# Patient Record
Sex: FEMALE | Race: WHITE | Employment: OTHER | URBAN - METROPOLITAN AREA
[De-identification: names, ages, dates, MRNs, and addresses within clinical notes are randomized per-mention and may not be internally consistent; named-entity substitution may affect disease eponyms.]

---

## 2017-01-13 RX ORDER — FENOFIBRATE 160 MG/1
TABLET ORAL
Qty: 90 TABLET | Refills: 0 | Status: SHIPPED | OUTPATIENT
Start: 2017-01-13 | End: 2017-04-13

## 2017-01-23 ENCOUNTER — OFFICE VISIT (OUTPATIENT)
Dept: INTERNAL MEDICINE CLINIC | Facility: CLINIC | Age: 77
End: 2017-01-23

## 2017-01-23 VITALS
WEIGHT: 212 LBS | SYSTOLIC BLOOD PRESSURE: 136 MMHG | DIASTOLIC BLOOD PRESSURE: 78 MMHG | OXYGEN SATURATION: 96 % | BODY MASS INDEX: 37.56 KG/M2 | HEIGHT: 63 IN | RESPIRATION RATE: 14 BRPM | HEART RATE: 74 BPM | TEMPERATURE: 98 F

## 2017-01-23 DIAGNOSIS — Z12.31 ENCOUNTER FOR SCREENING MAMMOGRAM FOR MALIGNANT NEOPLASM OF BREAST: ICD-10-CM

## 2017-01-23 DIAGNOSIS — Z13.89 SCREENING FOR BLOOD OR PROTEIN IN URINE: ICD-10-CM

## 2017-01-23 DIAGNOSIS — I10 ESSENTIAL HYPERTENSION, MALIGNANT: ICD-10-CM

## 2017-01-23 DIAGNOSIS — Z78.0 POSTMENOPAUSE: ICD-10-CM

## 2017-01-23 DIAGNOSIS — Z13.29 SCREENING FOR THYROID DISORDER: ICD-10-CM

## 2017-01-23 DIAGNOSIS — Z13.220 SCREENING CHOLESTEROL LEVEL: ICD-10-CM

## 2017-01-23 DIAGNOSIS — E78.1 HYPERTRIGLYCERIDEMIA: ICD-10-CM

## 2017-01-23 DIAGNOSIS — R73.02 GLUCOSE INTOLERANCE (IMPAIRED GLUCOSE TOLERANCE): Chronic | ICD-10-CM

## 2017-01-23 DIAGNOSIS — Z00.00 ROUTINE GENERAL MEDICAL EXAMINATION AT A HEALTH CARE FACILITY: Primary | ICD-10-CM

## 2017-01-23 PROCEDURE — G0439 PPPS, SUBSEQ VISIT: HCPCS | Performed by: INTERNAL MEDICINE

## 2017-01-23 PROCEDURE — 93000 ELECTROCARDIOGRAM COMPLETE: CPT | Performed by: INTERNAL MEDICINE

## 2017-01-23 RX ORDER — BENAZEPRIL HYDROCHLORIDE AND HYDROCHLOROTHIAZIDE 20; 12.5 MG/1; MG/1
TABLET ORAL
Qty: 90 TABLET | Refills: 0 | Status: SHIPPED | OUTPATIENT
Start: 2017-01-23 | End: 2017-04-13

## 2017-01-23 NOTE — PROGRESS NOTES
Neymar Renner [facility] 3/4/1940 is a 77year old female. Patient presents with:   Well Adult      HPI:   No new complaints    Current Outpatient Prescriptions:  BENAZEPRIL-HYDROCHLOROTHIAZIDE 20-12.5 MG Oral Tab TAKE 1 TABLET BY MOUTH EVERY DAY Disp: 90 tablet general state of health, no fatigue, no fever, no weakness, no weight loss or gain . HEENT/Neck:   Head no headache, no dizziness, no lightheadedness. Eyes Sees eye MD every 3 months- no redness, no drainage.  Ears no earaches, no fullness, normal hearing Patient denies depression, hallucinations, memory loss. Anxiety none. Insomnia none. EXAM:   /78 mmHg  Pulse 74  Temp(Src) 98.3 °F (36.8 °C) (Oral)  Resp 14  Ht 63\"  Wt 212 lb  BMI 37.56 kg/m2  SpO2 96%  Breastfeeding?  No    GENERAL: Supraclavicular: none. DERMATOLOGY:   Rash: no.      Refused PAP             ASSESSMENT AND PLAN:   Andrew Ramos was seen today for well adult.     Diagnoses and all orders for this visit:    Routine general medical examination at a health care facility    Neal

## 2017-01-26 ENCOUNTER — LAB ENCOUNTER (OUTPATIENT)
Dept: LAB | Age: 77
End: 2017-01-26
Attending: INTERNAL MEDICINE
Payer: MEDICARE

## 2017-01-26 DIAGNOSIS — Z13.89 SCREENING FOR BLOOD OR PROTEIN IN URINE: ICD-10-CM

## 2017-01-26 DIAGNOSIS — R73.02 GLUCOSE INTOLERANCE (IMPAIRED GLUCOSE TOLERANCE): Chronic | ICD-10-CM

## 2017-01-26 DIAGNOSIS — R73.09 IMPAIRED GLUCOSE TOLERANCE TEST: Primary | ICD-10-CM

## 2017-01-26 DIAGNOSIS — Z13.29 SCREENING FOR THYROID DISORDER: ICD-10-CM

## 2017-01-26 DIAGNOSIS — E78.1 HYPERTRIGLYCERIDEMIA: ICD-10-CM

## 2017-01-26 LAB
ALBUMIN SERPL-MCNC: 4.1 G/DL (ref 3.5–4.8)
ALP LIVER SERPL-CCNC: 44 U/L (ref 55–142)
ALT SERPL-CCNC: 35 U/L (ref 14–54)
AST SERPL-CCNC: 23 U/L (ref 15–41)
BASOPHILS # BLD AUTO: 0.06 X10(3) UL (ref 0–0.1)
BASOPHILS NFR BLD AUTO: 0.7 %
BILIRUB SERPL-MCNC: 0.4 MG/DL (ref 0.1–2)
BILIRUB UR QL STRIP.AUTO: NEGATIVE
BUN BLD-MCNC: 23 MG/DL (ref 8–20)
CALCIUM BLD-MCNC: 10 MG/DL (ref 8.3–10.3)
CHLORIDE: 103 MMOL/L (ref 101–111)
CHOLEST SMN-MCNC: 162 MG/DL (ref ?–200)
CO2: 30 MMOL/L (ref 22–32)
COLOR UR AUTO: YELLOW
CREAT BLD-MCNC: 0.96 MG/DL (ref 0.55–1.02)
CREAT UR-SCNC: 80.4 MG/DL
EOSINOPHIL # BLD AUTO: 0.39 X10(3) UL (ref 0–0.3)
EOSINOPHIL NFR BLD AUTO: 4.8 %
ERYTHROCYTE [DISTWIDTH] IN BLOOD BY AUTOMATED COUNT: 13.6 % (ref 11.5–16)
EST. AVERAGE GLUCOSE BLD GHB EST-MCNC: 126 MG/DL (ref 68–126)
GLUCOSE BLD-MCNC: 97 MG/DL (ref 70–99)
GLUCOSE UR STRIP.AUTO-MCNC: NEGATIVE MG/DL
HBA1C MFR BLD HPLC: 6 % (ref ?–5.7)
HCT VFR BLD AUTO: 42.3 % (ref 34–50)
HDLC SERPL-MCNC: 43 MG/DL (ref 45–?)
HDLC SERPL: 3.77 {RATIO} (ref ?–4.44)
HGB BLD-MCNC: 13.4 G/DL (ref 12–16)
IMMATURE GRANULOCYTE COUNT: 0.03 X10(3) UL (ref 0–1)
IMMATURE GRANULOCYTE RATIO %: 0.4 %
KETONES UR STRIP.AUTO-MCNC: NEGATIVE MG/DL
LDLC SERPL CALC-MCNC: 81 MG/DL (ref ?–130)
LYMPHOCYTES # BLD AUTO: 3.19 X10(3) UL (ref 0.9–4)
LYMPHOCYTES NFR BLD AUTO: 39.5 %
M PROTEIN MFR SERPL ELPH: 7.9 G/DL (ref 6.1–8.3)
MCH RBC QN AUTO: 28 PG (ref 27–33.2)
MCHC RBC AUTO-ENTMCNC: 31.7 G/DL (ref 31–37)
MCV RBC AUTO: 88.5 FL (ref 81–100)
MICROALBUMIN UR-MCNC: <0.5 MG/DL
MONOCYTES # BLD AUTO: 0.81 X10(3) UL (ref 0.1–0.6)
MONOCYTES NFR BLD AUTO: 10 %
NEUTROPHIL ABS PRELIM: 3.59 X10 (3) UL (ref 1.3–6.7)
NEUTROPHILS # BLD AUTO: 3.59 X10(3) UL (ref 1.3–6.7)
NEUTROPHILS NFR BLD AUTO: 44.6 %
NITRITE UR QL STRIP.AUTO: NEGATIVE
NONHDLC SERPL-MCNC: 119 MG/DL (ref ?–130)
PH UR STRIP.AUTO: 6 [PH] (ref 4.5–8)
PLATELET # BLD AUTO: 238 10(3)UL (ref 150–450)
POTASSIUM SERPL-SCNC: 4.3 MMOL/L (ref 3.6–5.1)
PROT UR STRIP.AUTO-MCNC: NEGATIVE MG/DL
RBC # BLD AUTO: 4.78 X10(6)UL (ref 3.8–5.1)
RBC UR QL AUTO: NEGATIVE
RED CELL DISTRIBUTION WIDTH-SD: 43.8 FL (ref 35.1–46.3)
SODIUM SERPL-SCNC: 142 MMOL/L (ref 136–144)
SP GR UR STRIP.AUTO: 1.02 (ref 1–1.03)
THYROXINE (T4): 10.7 UG/DL (ref 4.5–10.9)
TRIGLYCERIDES: 188 MG/DL (ref ?–150)
TSI SER-ACNC: 1.67 MIU/ML (ref 0.35–5.5)
UROBILINOGEN UR STRIP.AUTO-MCNC: 2 MG/DL
VLDL: 38 MG/DL (ref 5–40)
WBC # BLD AUTO: 8.1 X10(3) UL (ref 4–13)

## 2017-01-26 PROCEDURE — 81001 URINALYSIS AUTO W/SCOPE: CPT | Performed by: INTERNAL MEDICINE

## 2017-01-26 PROCEDURE — 84436 ASSAY OF TOTAL THYROXINE: CPT | Performed by: INTERNAL MEDICINE

## 2017-01-26 PROCEDURE — 80053 COMPREHEN METABOLIC PANEL: CPT | Performed by: INTERNAL MEDICINE

## 2017-01-26 PROCEDURE — 84443 ASSAY THYROID STIM HORMONE: CPT | Performed by: INTERNAL MEDICINE

## 2017-01-26 PROCEDURE — 80061 LIPID PANEL: CPT | Performed by: INTERNAL MEDICINE

## 2017-01-26 PROCEDURE — 82570 ASSAY OF URINE CREATININE: CPT | Performed by: INTERNAL MEDICINE

## 2017-01-26 PROCEDURE — 36415 COLL VENOUS BLD VENIPUNCTURE: CPT | Performed by: INTERNAL MEDICINE

## 2017-01-26 PROCEDURE — 83036 HEMOGLOBIN GLYCOSYLATED A1C: CPT | Performed by: INTERNAL MEDICINE

## 2017-01-26 PROCEDURE — 85025 COMPLETE CBC W/AUTO DIFF WBC: CPT | Performed by: INTERNAL MEDICINE

## 2017-01-26 PROCEDURE — 82043 UR ALBUMIN QUANTITATIVE: CPT | Performed by: INTERNAL MEDICINE

## 2017-02-16 ENCOUNTER — HOSPITAL ENCOUNTER (OUTPATIENT)
Dept: MAMMOGRAPHY | Age: 77
Discharge: HOME | End: 2017-02-16
Attending: INTERNAL MEDICINE
Payer: MEDICARE

## 2017-02-16 ENCOUNTER — OFFICE VISIT (OUTPATIENT)
Dept: INTERNAL MEDICINE CLINIC | Facility: CLINIC | Age: 77
End: 2017-02-16

## 2017-02-16 VITALS
SYSTOLIC BLOOD PRESSURE: 162 MMHG | BODY MASS INDEX: 37.56 KG/M2 | WEIGHT: 212 LBS | DIASTOLIC BLOOD PRESSURE: 82 MMHG | HEART RATE: 71 BPM | RESPIRATION RATE: 16 BRPM | HEIGHT: 63 IN | OXYGEN SATURATION: 97 %

## 2017-02-16 DIAGNOSIS — N76.0 ACUTE VAGINITIS: Primary | ICD-10-CM

## 2017-02-16 DIAGNOSIS — Z12.31 ENCOUNTER FOR SCREENING MAMMOGRAM FOR MALIGNANT NEOPLASM OF BREAST: ICD-10-CM

## 2017-02-16 PROCEDURE — 99213 OFFICE O/P EST LOW 20 MIN: CPT | Performed by: INTERNAL MEDICINE

## 2017-02-16 PROCEDURE — 77067 SCR MAMMO BI INCL CAD: CPT

## 2017-02-16 RX ORDER — FLUCONAZOLE 100 MG/1
100 TABLET ORAL DAILY
Qty: 7 TABLET | Refills: 0 | Status: SHIPPED | OUTPATIENT
Start: 2017-02-16 | End: 2017-02-23

## 2017-02-16 NOTE — PROGRESS NOTES
Author Flow LEE 3/4/1940 is a 77year old female. Patient presents with:  Vaginal Problem: itching       HPI:   GYN:    c/o Vaginal discharge white, associated with itching, treated with over-the-counter agents, without any benefit.    Denies : Abnormal 1/13/2016   • Osteoarthritis of left shoulder, unspecified osteoarthritis type 1/13/2016      Social History:    Smoking Status: Never Smoker                      Smokeless Status: Never Used                        Alcohol Use: Yes           0.0 oz/week

## 2017-02-24 ENCOUNTER — OFFICE VISIT (OUTPATIENT)
Dept: INTERNAL MEDICINE CLINIC | Facility: CLINIC | Age: 77
End: 2017-02-24

## 2017-02-24 VITALS
OXYGEN SATURATION: 98 % | SYSTOLIC BLOOD PRESSURE: 140 MMHG | DIASTOLIC BLOOD PRESSURE: 82 MMHG | HEART RATE: 71 BPM | HEIGHT: 62 IN | BODY MASS INDEX: 39.01 KG/M2 | RESPIRATION RATE: 17 BRPM | WEIGHT: 212 LBS | TEMPERATURE: 98 F

## 2017-02-24 DIAGNOSIS — B37.3 VAGINA, CANDIDIASIS: Primary | ICD-10-CM

## 2017-02-24 PROCEDURE — 99213 OFFICE O/P EST LOW 20 MIN: CPT | Performed by: INTERNAL MEDICINE

## 2017-02-24 NOTE — PROGRESS NOTES
Bruce Alfaro  3/4/1940 is a 77year old female. Patient presents with:   Follow - Up    Better symptoms are pretty much resolved  HPI:         Current Outpatient Prescriptions:  METOPROLOL TARTRATE 25 MG Oral Tab TAKE ONE TABLET BY MOUTH TWICE DAILY D Genitourinary:   Loss of control of urine no. Recurrent Urinary Tract Infection (UTI) no . Blood in urine no. Burning on urination no. Difficulty urinating no. Dysuria none. Flank pain no. Frequent Nighttime Urination none . Pain with urination none.  Ricardo Obrien

## 2017-02-28 RX ORDER — ALENDRONATE SODIUM 70 MG/1
TABLET ORAL
Qty: 12 TABLET | Refills: 0 | Status: SHIPPED | OUTPATIENT
Start: 2017-02-28 | End: 2017-05-17

## 2017-02-28 NOTE — TELEPHONE ENCOUNTER
Pt does not meet refill protocol for alendronate since last DEXA was done 12/2014. Medication pending, ok to refill?

## 2017-03-28 ENCOUNTER — OFFICE VISIT (OUTPATIENT)
Dept: INTERNAL MEDICINE CLINIC | Facility: CLINIC | Age: 77
End: 2017-03-28

## 2017-03-28 VITALS
WEIGHT: 216.5 LBS | OXYGEN SATURATION: 97 % | RESPIRATION RATE: 15 BRPM | TEMPERATURE: 98 F | BODY MASS INDEX: 39.84 KG/M2 | HEART RATE: 60 BPM | HEIGHT: 62 IN | SYSTOLIC BLOOD PRESSURE: 132 MMHG | DIASTOLIC BLOOD PRESSURE: 82 MMHG

## 2017-03-28 DIAGNOSIS — R30.0 DYSURIA: Primary | ICD-10-CM

## 2017-03-28 LAB
APPEARANCE: CLEAR
MULTISTIX LOT#: ABNORMAL NUMERIC
NITRITE, URINE: POSITIVE
PH, URINE: 7 (ref 4.5–8)
PROTEIN (URINE DIPSTICK): 30 MG/DL
SPECIFIC GRAVITY: 1.01 (ref 1–1.03)
URINE-COLOR: YELLOW
UROBILINOGEN,SEMI-QN: 2 MG/DL (ref 0–1.9)

## 2017-03-28 PROCEDURE — 81003 URINALYSIS AUTO W/O SCOPE: CPT | Performed by: INTERNAL MEDICINE

## 2017-03-28 PROCEDURE — 99213 OFFICE O/P EST LOW 20 MIN: CPT | Performed by: INTERNAL MEDICINE

## 2017-03-28 PROCEDURE — 87086 URINE CULTURE/COLONY COUNT: CPT | Performed by: INTERNAL MEDICINE

## 2017-03-28 RX ORDER — PHENAZOPYRIDINE HYDROCHLORIDE 200 MG/1
200 TABLET, FILM COATED ORAL 3 TIMES DAILY PRN
Qty: 10 TABLET | Refills: 0 | Status: SHIPPED | OUTPATIENT
Start: 2017-03-28 | End: 2017-03-31

## 2017-03-28 RX ORDER — CIPROFLOXACIN 500 MG/1
500 TABLET, FILM COATED ORAL 2 TIMES DAILY
Qty: 10 TABLET | Refills: 0 | Status: SHIPPED | OUTPATIENT
Start: 2017-03-28 | End: 2017-04-02

## 2017-03-28 NOTE — PROGRESS NOTES
Honorio Gong [facility] 3/4/1940  is a 69year old female. Patient presents with:  Dysuria  Urinary Frequency      HPI:   Patient presents with symptoms of UTI few days.   Frequency and burning noted    Current Outpatient Prescriptions:  Phenazopyridine HCl 200 Status: Never Smoker                      Smokeless Status: Never Used                        Alcohol Use: Yes           0.0 oz/week       0 Standard drinks or equivalent per week       Comment: RARE-WINE COOLER 1-2X YEARLY        REVIEW OF SYSTEMS:   GENE

## 2017-04-13 RX ORDER — FENOFIBRATE 160 MG/1
TABLET ORAL
Qty: 90 TABLET | Refills: 0 | Status: SHIPPED | OUTPATIENT
Start: 2017-04-13 | End: 2017-07-12

## 2017-04-13 RX ORDER — BENAZEPRIL HYDROCHLORIDE AND HYDROCHLOROTHIAZIDE 20; 12.5 MG/1; MG/1
TABLET ORAL
Qty: 90 TABLET | Refills: 0 | Status: SHIPPED | OUTPATIENT
Start: 2017-04-13 | End: 2017-07-12

## 2017-05-07 ENCOUNTER — HOSPITAL ENCOUNTER (EMERGENCY)
Facility: HOSPITAL | Age: 77
Discharge: HOME | End: 2017-05-07
Attending: EMERGENCY MEDICINE
Payer: MEDICARE

## 2017-05-07 VITALS
SYSTOLIC BLOOD PRESSURE: 153 MMHG | DIASTOLIC BLOOD PRESSURE: 83 MMHG | HEIGHT: 63 IN | TEMPERATURE: 97 F | WEIGHT: 211 LBS | OXYGEN SATURATION: 96 % | RESPIRATION RATE: 16 BRPM | BODY MASS INDEX: 37.39 KG/M2 | HEART RATE: 83 BPM

## 2017-05-07 DIAGNOSIS — L02.91 ABSCESS: Primary | ICD-10-CM

## 2017-05-07 PROCEDURE — 99283 EMERGENCY DEPT VISIT LOW MDM: CPT

## 2017-05-07 RX ORDER — SULFAMETHOXAZOLE AND TRIMETHOPRIM 800; 160 MG/1; MG/1
1 TABLET ORAL 2 TIMES DAILY
Qty: 14 TABLET | Refills: 0 | Status: SHIPPED | OUTPATIENT
Start: 2017-05-07 | End: 2017-05-14

## 2017-05-07 NOTE — ED INITIAL ASSESSMENT (HPI)
While putting down 33 bags of mulch today, patient developed a lump in her groin area that she noticed while showering. She notes that it is nonpainful unless she is sitting.  Denies N/V/D

## 2017-05-07 NOTE — ED PROVIDER NOTES
Patient Seen in: [facility] Emergency Department    History   Patient presents with:  Pain (neurologic)    Stated Complaint: groin pain from lifting mulch    HPI    Tsering Stein is a pleasant 32-year-old female coming with complaints of groin pain.   Patient FUTURE REFILLS)   METOPROLOL TARTRATE 25 MG Oral Tab,  TAKE ONE TABLET BY MOUTH TWICE DAILY   [place] 3 1000 MG Oral Cap,  Take 3 capsules by mouth daily. Meloxicam 7.5 MG Oral Tab,  1 tablet daily.    ASPIRIN 81 MG OR TBEC,  1 TABLET DAILY   Acetaminophen- head present. ED Course   Labs Reviewed - No data to display    MDM     Patient clean draped in a sterile fashion with gentle pressure purulent material was expressed from this abscess cavity.   Patient has the wound dressed appropriately she was given wou

## 2017-05-11 ENCOUNTER — OFFICE VISIT (OUTPATIENT)
Dept: INTERNAL MEDICINE CLINIC | Facility: CLINIC | Age: 77
End: 2017-05-11

## 2017-05-11 VITALS
DIASTOLIC BLOOD PRESSURE: 70 MMHG | SYSTOLIC BLOOD PRESSURE: 150 MMHG | BODY MASS INDEX: 38.71 KG/M2 | WEIGHT: 218.5 LBS | HEART RATE: 63 BPM | OXYGEN SATURATION: 97 % | HEIGHT: 63 IN | TEMPERATURE: 98 F | RESPIRATION RATE: 16 BRPM

## 2017-05-11 DIAGNOSIS — L08.9 SKIN INFECTION: Primary | ICD-10-CM

## 2017-05-11 PROCEDURE — 99213 OFFICE O/P EST LOW 20 MIN: CPT | Performed by: INTERNAL MEDICINE

## 2017-05-11 NOTE — PROGRESS NOTES
Kishan Escamilla  3/4/1940 is a 69year old female. Patient presents with:   Follow - Up      HPI:   Follow-up ER wound much better    Current Outpatient Prescriptions:  Sulfamethoxazole-TMP -160 MG Oral Tab per tablet Take 1 tablet by mouth 2 (two) COOLER 1-2X YEARLY       REVIEW OF SYSTEMS:   na      EXAM:   /70 mmHg  Pulse 63  Temp(Src) 98.3 °F (36.8 °C) (Oral)  Resp 16  Ht 63\"  Wt 218 lb 8 oz  BMI 38.72 kg/m2  SpO2 97%    Healing wound noted of the overhang of the abdomen on the left side w

## 2017-05-18 RX ORDER — ALENDRONATE SODIUM 70 MG/1
TABLET ORAL
Qty: 12 TABLET | Refills: 0 | Status: SHIPPED | OUTPATIENT
Start: 2017-05-18 | End: 2017-08-10

## 2017-05-30 PROBLEM — H35.3221 EXUDATIVE AGE-RELATED MACULAR DEGENERATION OF LEFT EYE WITH ACTIVE CHOROIDAL NEOVASCULARIZATION (HCC): Status: ACTIVE | Noted: 2017-05-30

## 2017-07-12 RX ORDER — BENAZEPRIL HYDROCHLORIDE AND HYDROCHLOROTHIAZIDE 20; 12.5 MG/1; MG/1
TABLET ORAL
Qty: 90 TABLET | Refills: 0 | Status: SHIPPED | OUTPATIENT
Start: 2017-07-12 | End: 2017-10-09

## 2017-07-12 RX ORDER — FENOFIBRATE 160 MG/1
TABLET ORAL
Qty: 90 TABLET | Refills: 0 | Status: SHIPPED | OUTPATIENT
Start: 2017-07-12 | End: 2017-10-05

## 2017-07-14 ENCOUNTER — OFFICE VISIT (OUTPATIENT)
Dept: INTERNAL MEDICINE CLINIC | Facility: CLINIC | Age: 77
End: 2017-07-14

## 2017-07-14 VITALS
DIASTOLIC BLOOD PRESSURE: 82 MMHG | RESPIRATION RATE: 17 BRPM | BODY MASS INDEX: 38.98 KG/M2 | OXYGEN SATURATION: 97 % | WEIGHT: 220 LBS | HEIGHT: 63 IN | TEMPERATURE: 98 F | HEART RATE: 70 BPM | SYSTOLIC BLOOD PRESSURE: 124 MMHG

## 2017-07-14 DIAGNOSIS — M54.50 ACUTE MIDLINE LOW BACK PAIN WITHOUT SCIATICA: Primary | ICD-10-CM

## 2017-07-14 LAB
BILIRUB UR QL STRIP.AUTO: NEGATIVE
CLARITY UR REFRACT.AUTO: CLEAR
COLOR UR AUTO: YELLOW
GLUCOSE UR STRIP.AUTO-MCNC: NEGATIVE MG/DL
KETONES UR STRIP.AUTO-MCNC: NEGATIVE MG/DL
LEUKOCYTE ESTERASE UR QL STRIP.AUTO: NEGATIVE
NITRITE UR QL STRIP.AUTO: NEGATIVE
PH UR STRIP.AUTO: 6 [PH] (ref 4.5–8)
PROT UR STRIP.AUTO-MCNC: NEGATIVE MG/DL
RBC UR QL AUTO: NEGATIVE
SP GR UR STRIP.AUTO: 1.01 (ref 1–1.03)
UROBILINOGEN UR STRIP.AUTO-MCNC: <2 MG/DL

## 2017-07-14 PROCEDURE — 81003 URINALYSIS AUTO W/O SCOPE: CPT | Performed by: INTERNAL MEDICINE

## 2017-07-14 PROCEDURE — 99214 OFFICE O/P EST MOD 30 MIN: CPT | Performed by: INTERNAL MEDICINE

## 2017-07-14 RX ORDER — CYCLOBENZAPRINE HCL 5 MG
5 TABLET ORAL 3 TIMES DAILY PRN
Qty: 30 TABLET | Refills: 0 | Status: SHIPPED | OUTPATIENT
Start: 2017-07-14 | End: 2017-07-24

## 2017-07-14 NOTE — PROGRESS NOTES
Zaynab Steinberg  3/4/1940 is a 69year old female.     Patient presents with:  Low Back Pain      HPI:   C/o of low back pain for few days =-working in the garden picking some feeds    Current Outpatient Prescriptions:  Cyclobenzaprine HCl 5 MG Oral Tab Dakota Bundy Comment: RARE-WINE COOLER 1-2X YEARLY       Lumbar Spine/Lower Back:     Radiation of pain none. Fall none. Direct trauma none. Tingling/numbness none. Previous injury none. Previous surgery none. Previous therapy none.    Bowel and bladder i Rebound tenderness: absent. MUSCULOSKELETAL:   Ankle: unremarkable, FROM. neuro vascular distally normal.   Hip: unremarkable, FROM without pain. Knee unremarkable, FROM.    L-S spines: forward flexion 60--extension 25--lat bending 25  Patient looks v 1. You may need to stay in bed the first few days. But, as soon as possible, begin sitting or walking to avoid problems with prolonged bed rest (muscle weakness, worsening back stiffness and pain, blood clots in the legs).   2. When in bed, try to find a po The patient is asked to return in Return if symptoms worsen or fail to improve.   Charo Estrada MD

## 2017-08-01 ENCOUNTER — OFFICE VISIT (OUTPATIENT)
Dept: INTERNAL MEDICINE CLINIC | Facility: CLINIC | Age: 77
End: 2017-08-01

## 2017-08-01 VITALS
WEIGHT: 220 LBS | OXYGEN SATURATION: 94 % | RESPIRATION RATE: 16 BRPM | HEART RATE: 82 BPM | HEIGHT: 63 IN | DIASTOLIC BLOOD PRESSURE: 80 MMHG | BODY MASS INDEX: 38.98 KG/M2 | TEMPERATURE: 98 F | SYSTOLIC BLOOD PRESSURE: 180 MMHG

## 2017-08-01 DIAGNOSIS — M54.50 ACUTE BILATERAL LOW BACK PAIN WITHOUT SCIATICA: Primary | ICD-10-CM

## 2017-08-01 PROCEDURE — 99213 OFFICE O/P EST LOW 20 MIN: CPT | Performed by: INTERNAL MEDICINE

## 2017-08-01 NOTE — PROGRESS NOTES
Romeo Knox  3/4/1940 is a 69year old female.     Patient presents with:  Pain: Pain in back      HPI:   Having low back pain now shooting down in the in the posterior aspect of her thighs    Current Outpatient Prescriptions:  METOPROLOL TARTRATE 25 MG incontinence none. Pain scale 3/10  Increased by movt    REVIEW OF SYSTEMS:     General/Constitutional:   General no fatigue, no fever, no weakness, no weight loss or gain. Gastrointestinal:   Reflux no. Abdominal pain no. Appetite change no.  Black sto distress        ASSESSMENT AND PLAN:   Vesna Lorenzana was seen today for pain. Diagnoses and all orders for this visit:    Acute bilateral low back pain without sciatica  -     OP REFERRAL TO [facility]  -     MRI SPINE LUMBAR (CPT=72148);  Fu

## 2017-08-10 RX ORDER — ALENDRONATE SODIUM 70 MG/1
70 TABLET ORAL WEEKLY
Qty: 12 TABLET | Refills: 0 | Status: SHIPPED | OUTPATIENT
Start: 2017-08-10 | End: 2017-10-26

## 2017-08-10 NOTE — TELEPHONE ENCOUNTER
Pt does not meet protocol due to    DEXA scan within past 2 years       Medication pending, ok to refill?

## 2017-08-15 ENCOUNTER — OFFICE VISIT (OUTPATIENT)
Dept: INTERNAL MEDICINE CLINIC | Facility: CLINIC | Age: 77
End: 2017-08-15

## 2017-08-15 VITALS
WEIGHT: 220 LBS | OXYGEN SATURATION: 98 % | RESPIRATION RATE: 16 BRPM | TEMPERATURE: 98 F | BODY MASS INDEX: 38.98 KG/M2 | HEIGHT: 63 IN | DIASTOLIC BLOOD PRESSURE: 80 MMHG | SYSTOLIC BLOOD PRESSURE: 142 MMHG | HEART RATE: 78 BPM

## 2017-08-15 DIAGNOSIS — M54.50 MIDLINE LOW BACK PAIN WITHOUT SCIATICA, UNSPECIFIED CHRONICITY: Primary | ICD-10-CM

## 2017-08-15 PROCEDURE — 99213 OFFICE O/P EST LOW 20 MIN: CPT | Performed by: INTERNAL MEDICINE

## 2017-08-15 NOTE — PROGRESS NOTES
Flower HOWARD 3/4/1940 is a 69year old female. Patient presents with: Follow - Up      HPI:   Back pain much better    Current Outpatient Prescriptions:  Alendronate Sodium 70 MG Oral Tab Take 1 tablet (70 mg total) by mouth once a week.  Disp: 12 t /80 (BP Location: Left arm, Patient Position: Sitting, Cuff Size: adult)   Pulse 78   Temp 98.1 °F (36.7 °C) (Oral)   Resp 16   Ht 63\"   Wt 220 lb   SpO2 98%   BMI 38.97 kg/m²   Lumbar Spine/Lower back:   LOWER BACK: normal sacroiliac joint mobili

## 2017-09-21 ENCOUNTER — OFFICE VISIT (OUTPATIENT)
Dept: INTERNAL MEDICINE CLINIC | Facility: CLINIC | Age: 77
End: 2017-09-21

## 2017-09-21 VITALS
DIASTOLIC BLOOD PRESSURE: 80 MMHG | RESPIRATION RATE: 16 BRPM | HEART RATE: 78 BPM | BODY MASS INDEX: 38.98 KG/M2 | HEIGHT: 63 IN | WEIGHT: 220 LBS | OXYGEN SATURATION: 98 % | SYSTOLIC BLOOD PRESSURE: 132 MMHG | TEMPERATURE: 98 F

## 2017-09-21 DIAGNOSIS — M54.50 CHRONIC BILATERAL LOW BACK PAIN WITHOUT SCIATICA: Primary | ICD-10-CM

## 2017-09-21 DIAGNOSIS — G89.29 CHRONIC BILATERAL LOW BACK PAIN WITHOUT SCIATICA: Primary | ICD-10-CM

## 2017-09-21 PROCEDURE — 99213 OFFICE O/P EST LOW 20 MIN: CPT | Performed by: INTERNAL MEDICINE

## 2017-09-21 NOTE — PROGRESS NOTES
Iesha Velez [facility] 3/4/1940 is a 69year old female. Patient presents with: Follow - Up      HPI:   Back pain pretty much resolved    Current Outpatient Prescriptions:  Alendronate Sodium 70 MG Oral Tab Take 1 tablet (70 mg total) by mouth once a week.  D /80   Pulse 78   Temp 98.2 °F (36.8 °C) (Oral)   Resp 16   Ht 63\"   Wt 220 lb   SpO2 98%   BMI 38.97 kg/m²   Lumbar Spine/Lower back:   LOWER BACK: normal sacroiliac joint mobility bilaterally. INSPECTION: normal curvature of spine.    PALPATION:

## 2017-10-05 RX ORDER — FENOFIBRATE 160 MG/1
TABLET ORAL
Qty: 90 TABLET | Refills: 0 | Status: SHIPPED | OUTPATIENT
Start: 2017-10-05 | End: 2018-01-05

## 2017-10-09 RX ORDER — BENAZEPRIL HYDROCHLORIDE AND HYDROCHLOROTHIAZIDE 20; 12.5 MG/1; MG/1
TABLET ORAL
Qty: 90 TABLET | Refills: 0 | Status: SHIPPED | OUTPATIENT
Start: 2017-10-09 | End: 2018-01-05

## 2017-10-27 RX ORDER — ALENDRONATE SODIUM 70 MG/1
TABLET ORAL
Qty: 12 TABLET | Refills: 0 | Status: SHIPPED | OUTPATIENT
Start: 2017-10-27 | End: 2018-01-24

## 2018-01-05 ENCOUNTER — TELEPHONE (OUTPATIENT)
Dept: INTERNAL MEDICINE CLINIC | Facility: CLINIC | Age: 78
End: 2018-01-05

## 2018-01-05 RX ORDER — FENOFIBRATE 160 MG/1
TABLET ORAL
Qty: 90 TABLET | Refills: 0 | Status: SHIPPED | OUTPATIENT
Start: 2018-01-05 | End: 2018-04-04

## 2018-01-08 RX ORDER — BENAZEPRIL HYDROCHLORIDE AND HYDROCHLOROTHIAZIDE 20; 12.5 MG/1; MG/1
TABLET ORAL
Qty: 90 TABLET | Refills: 0 | Status: SHIPPED | OUTPATIENT
Start: 2018-01-08 | End: 2018-04-06

## 2018-01-24 RX ORDER — ALENDRONATE SODIUM 70 MG/1
TABLET ORAL
Qty: 12 TABLET | Refills: 0 | Status: SHIPPED | OUTPATIENT
Start: 2018-01-24 | End: 2018-04-16

## 2018-01-26 ENCOUNTER — OFFICE VISIT (OUTPATIENT)
Dept: INTERNAL MEDICINE CLINIC | Facility: CLINIC | Age: 78
End: 2018-01-26

## 2018-01-26 VITALS
RESPIRATION RATE: 16 BRPM | SYSTOLIC BLOOD PRESSURE: 170 MMHG | BODY MASS INDEX: 38.94 KG/M2 | OXYGEN SATURATION: 95 % | HEART RATE: 80 BPM | DIASTOLIC BLOOD PRESSURE: 100 MMHG | HEIGHT: 63 IN | TEMPERATURE: 98 F | WEIGHT: 219.75 LBS

## 2018-01-26 DIAGNOSIS — R73.02 GLUCOSE INTOLERANCE (IMPAIRED GLUCOSE TOLERANCE): Chronic | ICD-10-CM

## 2018-01-26 DIAGNOSIS — Z00.00 ROUTINE GENERAL MEDICAL EXAMINATION AT A HEALTH CARE FACILITY: Primary | ICD-10-CM

## 2018-01-26 DIAGNOSIS — I10 ESSENTIAL HYPERTENSION, BENIGN: Chronic | ICD-10-CM

## 2018-01-26 DIAGNOSIS — M81.0 SENILE OSTEOPOROSIS: ICD-10-CM

## 2018-01-26 DIAGNOSIS — Z12.39 SCREENING FOR BREAST CANCER: ICD-10-CM

## 2018-01-26 PROBLEM — H35.3221 EXUDATIVE AGE-RELATED MACULAR DEGENERATION OF LEFT EYE WITH ACTIVE CHOROIDAL NEOVASCULARIZATION (HCC): Chronic | Status: ACTIVE | Noted: 2017-05-30

## 2018-01-26 PROCEDURE — G0439 PPPS, SUBSEQ VISIT: HCPCS | Performed by: INTERNAL MEDICINE

## 2018-01-26 PROCEDURE — 93000 ELECTROCARDIOGRAM COMPLETE: CPT | Performed by: INTERNAL MEDICINE

## 2018-01-26 NOTE — PROGRESS NOTES
Marguerite Luo [facility] 3/4/1940 is a 69year old female. No chief complaint on file. HPI:   No new complaints    Current Outpatient Prescriptions:  metoprolol Tartrate 25 MG Oral Tab Take 2 tablets (50 mg total) by mouth 2 (two) times daily.  Disp: 180 ta fullness, normal hearing, no tinnitus. Nose and Sinuses no recurrent colds, no discharge, no itching, no hay fever, no nosebleeds, no sinus trouble. Mouth and Pharynx no sore throats, no hoarseness. Neck no lumps, no goiter, no neck stiffness or pain.    En 38.93 kg/m²     GENERAL:   Build: normal .   General Appearance: alert and oriented, pleasant. HEENT:   Ear canals: normal.   EOM: within normal limit-conjunctiva normal.   Head: normocephalic. Nasal septum: midline. Nose: unremarkable.    Oral cavity health care facility  -     ASSAY, THYROID STIM HORMONE; Future  -     T4(THYROXINE TOTAL); Future  -     LIPID PANEL; Future  -     COMP METABOLIC PANEL (14); Future  -     CBC WITH DIFFERENTIAL WITH PLATELET;  Future  -     URINALYSIS, ROUTINE; Future

## 2018-02-02 ENCOUNTER — LAB ENCOUNTER (OUTPATIENT)
Dept: LAB | Age: 78
End: 2018-02-02
Attending: INTERNAL MEDICINE
Payer: MEDICARE

## 2018-02-02 DIAGNOSIS — Z00.00 ROUTINE GENERAL MEDICAL EXAMINATION AT A HEALTH CARE FACILITY: ICD-10-CM

## 2018-02-02 DIAGNOSIS — R73.02 GLUCOSE INTOLERANCE (IMPAIRED GLUCOSE TOLERANCE): Chronic | ICD-10-CM

## 2018-02-02 LAB
ALBUMIN SERPL-MCNC: 4.3 G/DL (ref 3.5–4.8)
ALP LIVER SERPL-CCNC: 43 U/L (ref 55–142)
ALT SERPL-CCNC: 34 U/L (ref 14–54)
AST SERPL-CCNC: 25 U/L (ref 15–41)
BASOPHILS # BLD AUTO: 0.04 X10(3) UL (ref 0–0.1)
BASOPHILS NFR BLD AUTO: 0.4 %
BILIRUB SERPL-MCNC: 0.6 MG/DL (ref 0.1–2)
BILIRUB UR QL STRIP.AUTO: NEGATIVE
BUN BLD-MCNC: 25 MG/DL (ref 8–20)
CALCIUM BLD-MCNC: 10.1 MG/DL (ref 8.3–10.3)
CHLORIDE: 103 MMOL/L (ref 101–111)
CHOLEST SMN-MCNC: 178 MG/DL (ref ?–200)
CLARITY UR REFRACT.AUTO: CLEAR
CO2: 27 MMOL/L (ref 22–32)
COLOR UR AUTO: YELLOW
CREAT BLD-MCNC: 1.01 MG/DL (ref 0.55–1.02)
EOSINOPHIL # BLD AUTO: 0.29 X10(3) UL (ref 0–0.3)
EOSINOPHIL NFR BLD AUTO: 3.2 %
ERYTHROCYTE [DISTWIDTH] IN BLOOD BY AUTOMATED COUNT: 13.6 % (ref 11.5–16)
EST. AVERAGE GLUCOSE BLD GHB EST-MCNC: 134 MG/DL (ref 68–126)
GLUCOSE BLD-MCNC: 114 MG/DL (ref 70–99)
GLUCOSE UR STRIP.AUTO-MCNC: NEGATIVE MG/DL
HBA1C MFR BLD HPLC: 6.3 % (ref ?–5.7)
HCT VFR BLD AUTO: 41.7 % (ref 34–50)
HDLC SERPL-MCNC: 37 MG/DL (ref 45–?)
HDLC SERPL: 4.81 {RATIO} (ref ?–4.44)
HGB BLD-MCNC: 13.4 G/DL (ref 12–16)
IMMATURE GRANULOCYTE COUNT: 0.05 X10(3) UL (ref 0–1)
IMMATURE GRANULOCYTE RATIO %: 0.6 %
KETONES UR STRIP.AUTO-MCNC: NEGATIVE MG/DL
LDLC SERPL CALC-MCNC: 89 MG/DL (ref ?–130)
LEUKOCYTE ESTERASE UR QL STRIP.AUTO: NEGATIVE
LYMPHOCYTES # BLD AUTO: 3.29 X10(3) UL (ref 0.9–4)
LYMPHOCYTES NFR BLD AUTO: 36.5 %
M PROTEIN MFR SERPL ELPH: 7.9 G/DL (ref 6.1–8.3)
MCH RBC QN AUTO: 27.4 PG (ref 27–33.2)
MCHC RBC AUTO-ENTMCNC: 32.1 G/DL (ref 31–37)
MCV RBC AUTO: 85.3 FL (ref 81–100)
MONOCYTES # BLD AUTO: 0.91 X10(3) UL (ref 0.1–0.6)
MONOCYTES NFR BLD AUTO: 10.1 %
NEUTROPHIL ABS PRELIM: 4.44 X10 (3) UL (ref 1.3–6.7)
NEUTROPHILS # BLD AUTO: 4.44 X10(3) UL (ref 1.3–6.7)
NEUTROPHILS NFR BLD AUTO: 49.2 %
NITRITE UR QL STRIP.AUTO: NEGATIVE
NONHDLC SERPL-MCNC: 141 MG/DL (ref ?–130)
PH UR STRIP.AUTO: 7 [PH] (ref 4.5–8)
PLATELET # BLD AUTO: 293 10(3)UL (ref 150–450)
POTASSIUM SERPL-SCNC: 4 MMOL/L (ref 3.6–5.1)
PROT UR STRIP.AUTO-MCNC: NEGATIVE MG/DL
RBC # BLD AUTO: 4.89 X10(6)UL (ref 3.8–5.1)
RBC UR QL AUTO: NEGATIVE
RED CELL DISTRIBUTION WIDTH-SD: 42.2 FL (ref 35.1–46.3)
SODIUM SERPL-SCNC: 139 MMOL/L (ref 136–144)
SP GR UR STRIP.AUTO: 1.01 (ref 1–1.03)
THYROXINE (T4): 10.6 UG/DL (ref 4.5–10.9)
TRIGL SERPL-MCNC: 259 MG/DL (ref ?–150)
TSI SER-ACNC: 2.24 MIU/ML (ref 0.35–5.5)
UROBILINOGEN UR STRIP.AUTO-MCNC: <2 MG/DL
VLDLC SERPL CALC-MCNC: 52 MG/DL (ref 5–40)
WBC # BLD AUTO: 9 X10(3) UL (ref 4–13)

## 2018-02-02 PROCEDURE — 80053 COMPREHEN METABOLIC PANEL: CPT

## 2018-02-02 PROCEDURE — 84436 ASSAY OF TOTAL THYROXINE: CPT

## 2018-02-02 PROCEDURE — 36415 COLL VENOUS BLD VENIPUNCTURE: CPT

## 2018-02-02 PROCEDURE — 80061 LIPID PANEL: CPT

## 2018-02-02 PROCEDURE — 84443 ASSAY THYROID STIM HORMONE: CPT

## 2018-02-02 PROCEDURE — 81003 URINALYSIS AUTO W/O SCOPE: CPT

## 2018-02-02 PROCEDURE — 83036 HEMOGLOBIN GLYCOSYLATED A1C: CPT

## 2018-02-02 PROCEDURE — 85025 COMPLETE CBC W/AUTO DIFF WBC: CPT

## 2018-02-06 ENCOUNTER — TELEPHONE (OUTPATIENT)
Dept: INTERNAL MEDICINE CLINIC | Facility: CLINIC | Age: 78
End: 2018-02-06

## 2018-02-06 DIAGNOSIS — E78.1 HYPERTRIGLYCERIDEMIA: ICD-10-CM

## 2018-02-06 DIAGNOSIS — R73.02 GLUCOSE INTOLERANCE (IMPAIRED GLUCOSE TOLERANCE): Chronic | ICD-10-CM

## 2018-02-06 NOTE — TELEPHONE ENCOUNTER
Please review for correct instructions. Should pt be taking once daily or BID? If BID new script should be sent - the request from pharmacy is for daily dosing.        Requesting Metorprolol Tartrate 25 mg tabs  LOV: 1/26/18  RTC: 1 week   Last Releva

## 2018-02-06 NOTE — TELEPHONE ENCOUNTER
----- Message from José Oviedo MD sent at 2/3/2018  8:25 AM CST -----  Reviewed results   Triglycerides marginally elevated sio is the hemoglobin A1c  Reinforce diet and exercise and recheck in 3 months prior to any med adjustments

## 2018-02-16 ENCOUNTER — TELEPHONE (OUTPATIENT)
Dept: INTERNAL MEDICINE CLINIC | Facility: CLINIC | Age: 78
End: 2018-02-16

## 2018-02-20 ENCOUNTER — HOSPITAL ENCOUNTER (OUTPATIENT)
Dept: MAMMOGRAPHY | Age: 78
Discharge: HOME | End: 2018-02-20
Attending: INTERNAL MEDICINE
Payer: MEDICARE

## 2018-02-20 DIAGNOSIS — Z12.39 SCREENING FOR BREAST CANCER: ICD-10-CM

## 2018-02-20 PROCEDURE — 77067 SCR MAMMO BI INCL CAD: CPT | Performed by: INTERNAL MEDICINE

## 2018-02-21 ENCOUNTER — TELEPHONE (OUTPATIENT)
Dept: INTERNAL MEDICINE CLINIC | Facility: CLINIC | Age: 78
End: 2018-02-21

## 2018-02-21 NOTE — TELEPHONE ENCOUNTER
Patient was called and notified that her forms are complete and will be mailed to [address]

## 2018-04-04 RX ORDER — FENOFIBRATE 160 MG/1
TABLET ORAL
Qty: 90 TABLET | Refills: 0 | Status: SHIPPED | OUTPATIENT
Start: 2018-04-04 | End: 2018-07-08

## 2018-04-04 NOTE — TELEPHONE ENCOUNTER
Refill requested: Fenofribrate 160 mg      Passed protocol      Last refill: 1/5/18 #90 NR  Relevant Labs: 2/2/18 -Recheck in May   Last OV / RTC advised: 1/26/18  Appt Scheduled: No  Your appointments     Date & Time Appointment Department [facility])    José Manuel

## 2018-04-09 RX ORDER — BENAZEPRIL HYDROCHLORIDE AND HYDROCHLOROTHIAZIDE 20; 12.5 MG/1; MG/1
TABLET ORAL
Qty: 90 TABLET | Refills: 0 | Status: SHIPPED | OUTPATIENT
Start: 2018-04-09 | End: 2018-07-11

## 2018-04-09 NOTE — TELEPHONE ENCOUNTER
Medication(s) to Refill:   Pending Prescriptions Disp Refills    BENAZEPRIL-HYDROCHLOROTHIAZIDE 20-12.5 MG Oral Tab [Pharmacy Med Name: BENAZEPRIL/HCTZ 20/12.5MG TABLETS] 90 tablet 0     Sig: TAKE 1 TABLET BY MOUTH EVERY DAY             Reason for [address]

## 2018-04-17 RX ORDER — ALENDRONATE SODIUM 70 MG/1
TABLET ORAL
Qty: 12 TABLET | Refills: 0 | Status: SHIPPED | OUTPATIENT
Start: 2018-04-17 | End: 2018-07-08

## 2018-04-18 PROBLEM — R29.898 WEAKNESS OF RIGHT HAND: Status: ACTIVE | Noted: 2018-04-18

## 2018-04-18 PROBLEM — M19.049 ARTHRITIS OF HAND: Status: ACTIVE | Noted: 2018-04-18

## 2018-05-30 PROBLEM — R20.2 RIGHT HAND PARESTHESIA: Status: ACTIVE | Noted: 2018-05-30

## 2018-07-09 RX ORDER — ALENDRONATE SODIUM 70 MG/1
70 TABLET ORAL WEEKLY
Qty: 12 TABLET | Refills: 1 | Status: SHIPPED | OUTPATIENT
Start: 2018-07-09 | End: 2018-12-26

## 2018-07-09 RX ORDER — FENOFIBRATE 160 MG/1
160 TABLET ORAL DAILY
Qty: 90 TABLET | Refills: 1 | Status: SHIPPED | OUTPATIENT
Start: 2018-07-09 | End: 2019-01-05

## 2018-07-09 NOTE — TELEPHONE ENCOUNTER
Refill requested: Fenofibrate and Alendronate     Failed protocol - Alendronate   Passed protocol - Fenofibrate       Last refill: 4/17/2018 Alendronate 70 mg #12 NR        4/4/2018 Fenofibrate 160 mg #90 NR    Relevant Labs: 2/2/2018 Lipid Panel     12/24

## 2018-07-12 RX ORDER — BENAZEPRIL HYDROCHLORIDE AND HYDROCHLOROTHIAZIDE 20; 12.5 MG/1; MG/1
1 TABLET ORAL DAILY
Qty: 90 TABLET | Refills: 0 | Status: SHIPPED | OUTPATIENT
Start: 2018-07-12 | End: 2018-09-11

## 2018-07-12 NOTE — TELEPHONE ENCOUNTER
Refill requested: Benazepril / Hydrochlorothiazide 20-12.5 mg     Passed protocol      Last refill: 4/9/18 #90 NR    Relevant Labs: CMP 2/2/18   BP Readings from Last 3 Encounters:  01/26/18 : (!) 170/100  09/21/17 : 132/80  08/15/17 : 142/80      Last OV

## 2018-07-31 ENCOUNTER — OFFICE VISIT (OUTPATIENT)
Dept: INTERNAL MEDICINE CLINIC | Facility: CLINIC | Age: 78
End: 2018-07-31
Payer: MEDICARE

## 2018-07-31 VITALS
TEMPERATURE: 98 F | HEART RATE: 58 BPM | RESPIRATION RATE: 18 BRPM | WEIGHT: 230 LBS | SYSTOLIC BLOOD PRESSURE: 160 MMHG | OXYGEN SATURATION: 97 % | HEIGHT: 63 IN | DIASTOLIC BLOOD PRESSURE: 86 MMHG | BODY MASS INDEX: 40.75 KG/M2

## 2018-07-31 DIAGNOSIS — E78.1 HYPERTRIGLYCERIDEMIA: ICD-10-CM

## 2018-07-31 DIAGNOSIS — R73.02 GLUCOSE INTOLERANCE (IMPAIRED GLUCOSE TOLERANCE): Chronic | ICD-10-CM

## 2018-07-31 DIAGNOSIS — I10 ESSENTIAL HYPERTENSION, BENIGN: Primary | Chronic | ICD-10-CM

## 2018-07-31 PROCEDURE — 99213 OFFICE O/P EST LOW 20 MIN: CPT | Performed by: INTERNAL MEDICINE

## 2018-07-31 RX ORDER — AMLODIPINE BESYLATE 5 MG/1
5 TABLET ORAL DAILY
Qty: 30 TABLET | Refills: 11 | Status: SHIPPED | OUTPATIENT
Start: 2018-07-31 | End: 2018-08-30

## 2018-07-31 NOTE — PROGRESS NOTES
Ross Schneider [facility] 3/4/1940 is a 69year old female. Patient presents with: Follow - Up       HPI:   Follow-up 6 months -no complaints. Current Outpatient Prescriptions: AmLODIPine Besylate 5 MG Oral Tab Take 1 tablet (5 mg total) by mouth daily.  D Dizziness no. Dyspnea on exertion none. Fainting none. Fatigue no. High blood pressure on medication(s). Irregular heart beat no. Leg edema no. Murmurs no. Orthopnea no.       EXAM:   /80 (BP Location: Left arm, Patient Position: Sitting, Cuff Size: a

## 2018-08-21 ENCOUNTER — APPOINTMENT (OUTPATIENT)
Dept: LAB | Age: 78
End: 2018-08-21
Attending: INTERNAL MEDICINE
Payer: MEDICARE

## 2018-08-21 DIAGNOSIS — E78.1 HYPERTRIGLYCERIDEMIA: ICD-10-CM

## 2018-08-21 DIAGNOSIS — I10 ESSENTIAL HYPERTENSION, BENIGN: Chronic | ICD-10-CM

## 2018-08-21 DIAGNOSIS — R73.02 GLUCOSE INTOLERANCE (IMPAIRED GLUCOSE TOLERANCE): Chronic | ICD-10-CM

## 2018-08-21 LAB
ALBUMIN SERPL-MCNC: 4.2 G/DL (ref 3.5–4.8)
ALBUMIN/GLOB SERPL: 1.2 {RATIO} (ref 1–2)
ALP LIVER SERPL-CCNC: 44 U/L (ref 55–142)
ALT SERPL-CCNC: 28 U/L (ref 14–54)
ANION GAP SERPL CALC-SCNC: 8 MMOL/L (ref 0–18)
AST SERPL-CCNC: 24 U/L (ref 15–41)
BILIRUB SERPL-MCNC: 0.4 MG/DL (ref 0.1–2)
BUN BLD-MCNC: 24 MG/DL (ref 8–20)
BUN/CREAT SERPL: 24 (ref 10–20)
CALCIUM BLD-MCNC: 10.3 MG/DL (ref 8.3–10.3)
CHLORIDE SERPL-SCNC: 105 MMOL/L (ref 101–111)
CHOLEST SMN-MCNC: 197 MG/DL (ref ?–200)
CO2 SERPL-SCNC: 29 MMOL/L (ref 22–32)
CREAT BLD-MCNC: 1 MG/DL (ref 0.55–1.02)
EST. AVERAGE GLUCOSE BLD GHB EST-MCNC: 140 MG/DL (ref 68–126)
GLOBULIN PLAS-MCNC: 3.4 G/DL (ref 2.5–4)
GLUCOSE BLD-MCNC: 124 MG/DL (ref 70–99)
HBA1C MFR BLD HPLC: 6.5 % (ref ?–5.7)
HDLC SERPL-MCNC: 34 MG/DL (ref 40–59)
LDLC SERPL CALC-MCNC: 102 MG/DL (ref ?–100)
M PROTEIN MFR SERPL ELPH: 7.6 G/DL (ref 6.1–8.3)
NONHDLC SERPL-MCNC: 163 MG/DL (ref ?–130)
OSMOLALITY SERPL CALC.SUM OF ELEC: 299 MOSM/KG (ref 275–295)
POTASSIUM SERPL-SCNC: 4.7 MMOL/L (ref 3.6–5.1)
SODIUM SERPL-SCNC: 142 MMOL/L (ref 136–144)
TRIGL SERPL-MCNC: 303 MG/DL (ref 30–149)
VLDLC SERPL CALC-MCNC: 61 MG/DL (ref 0–30)

## 2018-08-21 PROCEDURE — 36415 COLL VENOUS BLD VENIPUNCTURE: CPT

## 2018-08-21 PROCEDURE — 80061 LIPID PANEL: CPT

## 2018-08-21 PROCEDURE — 83036 HEMOGLOBIN GLYCOSYLATED A1C: CPT

## 2018-08-21 PROCEDURE — 80053 COMPREHEN METABOLIC PANEL: CPT

## 2018-08-28 ENCOUNTER — HOSPITAL ENCOUNTER (OUTPATIENT)
Dept: MRI IMAGING | Age: 78
Discharge: HOME | End: 2018-08-28
Attending: ORTHOPAEDIC SURGERY
Payer: MEDICARE

## 2018-08-28 DIAGNOSIS — G56.21 LESION OF RIGHT ULNAR NERVE: ICD-10-CM

## 2018-08-28 DIAGNOSIS — M19.031 ARTHRITIS OF RIGHT WRIST: ICD-10-CM

## 2018-08-28 PROCEDURE — 73221 MRI JOINT UPR EXTREM W/O DYE: CPT | Performed by: ORTHOPAEDIC SURGERY

## 2018-08-30 ENCOUNTER — OFFICE VISIT (OUTPATIENT)
Dept: INTERNAL MEDICINE CLINIC | Facility: CLINIC | Age: 78
End: 2018-08-30
Payer: MEDICARE

## 2018-08-30 VITALS
DIASTOLIC BLOOD PRESSURE: 70 MMHG | OXYGEN SATURATION: 97 % | WEIGHT: 230 LBS | BODY MASS INDEX: 41 KG/M2 | TEMPERATURE: 98 F | SYSTOLIC BLOOD PRESSURE: 130 MMHG | RESPIRATION RATE: 12 BRPM | HEART RATE: 60 BPM

## 2018-08-30 DIAGNOSIS — E78.1 HYPERTRIGLYCERIDEMIA: ICD-10-CM

## 2018-08-30 DIAGNOSIS — I10 ESSENTIAL HYPERTENSION, BENIGN: Primary | Chronic | ICD-10-CM

## 2018-08-30 DIAGNOSIS — R73.02 GLUCOSE INTOLERANCE (IMPAIRED GLUCOSE TOLERANCE): Chronic | ICD-10-CM

## 2018-08-30 PROCEDURE — 99213 OFFICE O/P EST LOW 20 MIN: CPT | Performed by: INTERNAL MEDICINE

## 2018-08-30 RX ORDER — METFORMIN HYDROCHLORIDE 750 MG/1
750 TABLET, EXTENDED RELEASE ORAL DAILY
Qty: 30 TABLET | Refills: 3 | Status: SHIPPED | OUTPATIENT
Start: 2018-08-30 | End: 2018-11-30

## 2018-08-30 RX ORDER — AMLODIPINE BESYLATE 5 MG/1
5 TABLET ORAL DAILY
Qty: 30 TABLET | Refills: 11 | Status: SHIPPED | OUTPATIENT
Start: 2018-08-30 | End: 2019-03-28

## 2018-08-30 RX ORDER — BIOTIN 1 MG
1 TABLET ORAL DAILY
COMMUNITY

## 2018-08-30 RX ORDER — VIT A/VIT C/VIT E/ZINC/COPPER 7160-113
2 TABLET, DELAYED RELEASE (ENTERIC COATED) ORAL 2 TIMES DAILY
COMMUNITY

## 2018-08-30 NOTE — PATIENT INSTRUCTIONS
Spacing of meals -varying exercises discussed with patient   Reasoning of checking sugars pre and 2 hours  PP discussed with pt     Diabetic foot care information and brochure given to the patient

## 2018-08-30 NOTE — PROGRESS NOTES
Andra Shah [facility] 3/4/1940 is a 69year old female. 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  -     HEMOGLOBIN A1C; Future    Hypertriglyceridemia  -     LIPID PANEL;  Future       Patient was advised to see the nutritionist she refused

## 2018-09-10 ENCOUNTER — TELEPHONE (OUTPATIENT)
Dept: INTERNAL MEDICINE CLINIC | Facility: CLINIC | Age: 78
End: 2018-09-10

## 2018-09-10 NOTE — TELEPHONE ENCOUNTER
Spoke with pharmacy. They stated that pt informed them she was to start taking Benazepril-Hydrochlorothiazide 20-12.5 MG Oral Tab BID instead of daily. Per OV notes and medication list I do not see where pt was instructed to take 2 tablets.      Please advi

## 2018-09-10 NOTE — TELEPHONE ENCOUNTER
[facility] pharmacy is calling to have the dose changed for the prescription for Benazepril-Hydrochlorothiazide 20-12.5 MG Oral Tab. Needs to be changed to 2 tablets instead of 1. Please advise.

## 2018-09-10 NOTE — TELEPHONE ENCOUNTER
Please  verify with patient all the medicine she is taking.   Please verify were these the medicines she was taking during her last office visit

## 2018-09-12 PROBLEM — G56.21 LESION OF RIGHT ULNAR NERVE: Status: ACTIVE | Noted: 2018-09-12

## 2018-09-12 PROBLEM — M67.431 GANGLION OF RIGHT WRIST: Status: ACTIVE | Noted: 2018-09-12

## 2018-09-13 RX ORDER — BENAZEPRIL HYDROCHLORIDE AND HYDROCHLOROTHIAZIDE 20; 12.5 MG/1; MG/1
TABLET ORAL
Qty: 90 TABLET | Refills: 0 | Status: SHIPPED | OUTPATIENT
Start: 2018-09-13 | End: 2018-12-09

## 2018-09-13 NOTE — TELEPHONE ENCOUNTER
Protocol passed    Medication(s) to Refill:   Requested Prescriptions     Pending Prescriptions Disp Refills   • BENAZEPRIL-HYDROCHLOROTHIAZIDE 20-12.5 MG Oral Tab [Pharmacy Med Name: BENAZEPRIL/HCTZ 20/12.5MG TABLETS] 90 tablet 0     Sig: TAKE 1 TABLET BY Resulting Agency  [facility]         Specimen Collected: 08/21/18  7:58 AM   Last Resulted: 08/21/18  1:52 PM

## 2018-09-14 ENCOUNTER — TELEPHONE (OUTPATIENT)
Dept: INTERNAL MEDICINE CLINIC | Facility: CLINIC | Age: 78
End: 2018-09-14

## 2018-09-14 NOTE — TELEPHONE ENCOUNTER
Verified medications with pt. Pt was taking 1 tablet of benazepril-hctz daily. Pt states she was instructed at [facility] on 8/30/18 to start taking 2 tablets. Please advise.

## 2018-09-15 NOTE — TELEPHONE ENCOUNTER
[facility] patient's voice message at home to continue current medicines as listed on the med sheet. To see me on Monday for verification of meds.   Unable to leave message on cell phone

## 2018-09-15 NOTE — TELEPHONE ENCOUNTER
Patient scheduled OV for this Monday    Future Appointments   Date Time Provider Isiah Rodríguez   9/17/2018 10:00 AM Maggy Alves MD [facility] [place]

## 2018-09-17 ENCOUNTER — OFFICE VISIT (OUTPATIENT)
Dept: INTERNAL MEDICINE CLINIC | Facility: CLINIC | Age: 78
End: 2018-09-17
Payer: MEDICARE

## 2018-09-17 VITALS
HEART RATE: 67 BPM | TEMPERATURE: 98 F | DIASTOLIC BLOOD PRESSURE: 68 MMHG | WEIGHT: 222.25 LBS | BODY MASS INDEX: 39 KG/M2 | OXYGEN SATURATION: 97 % | SYSTOLIC BLOOD PRESSURE: 130 MMHG | RESPIRATION RATE: 12 BRPM

## 2018-09-17 DIAGNOSIS — I10 ESSENTIAL HYPERTENSION, BENIGN: Primary | Chronic | ICD-10-CM

## 2018-09-17 PROCEDURE — 99213 OFFICE O/P EST LOW 20 MIN: CPT | Performed by: INTERNAL MEDICINE

## 2018-09-17 NOTE — PROGRESS NOTES
Brittany Ledesma [facility] 3/4/1940 is a 69year old female.     Patient presents with:  Medication Follow-Up    bp check med verification    Current Outpatient Medications:  BENAZEPRIL-HYDROCHLOROTHIAZIDE 20-12.5 MG Oral Tab TAKE 1 TABLET BY MOUTH DAILY Disp: 90 t COOLER 1-2 X YEARLY    Drug use: No       REVIEW OF SYSTEMS:   Cardiovascular:   Syncope none. Rapid heart beat at rest no. Change in exercise tolerance no. Chest pain no. Chest pain while awake none. Cold extremities no. Dizziness no.  Dyspnea on exertion

## 2018-10-22 NOTE — TELEPHONE ENCOUNTER
Medication(s) to Refill:   Requested Prescriptions     Pending Prescriptions Disp Refills   • METOPROLOL TARTRATE 25 MG Oral Tab [Pharmacy Med Name: METOPROL TAR 25MG   TAB] 180 tablet 0     Sig: TAKE ONE TABLET BY MOUTH TWICE DAILY       Last Time Angelica Liu

## 2018-11-15 ENCOUNTER — APPOINTMENT (OUTPATIENT)
Dept: LAB | Age: 78
End: 2018-11-15
Attending: INTERNAL MEDICINE
Payer: MEDICARE

## 2018-11-15 DIAGNOSIS — R73.02 GLUCOSE INTOLERANCE (IMPAIRED GLUCOSE TOLERANCE): Chronic | ICD-10-CM

## 2018-11-15 DIAGNOSIS — I10 ESSENTIAL HYPERTENSION, BENIGN: Chronic | ICD-10-CM

## 2018-11-15 DIAGNOSIS — E78.1 HYPERTRIGLYCERIDEMIA: ICD-10-CM

## 2018-11-15 PROCEDURE — 80061 LIPID PANEL: CPT

## 2018-11-15 PROCEDURE — 80053 COMPREHEN METABOLIC PANEL: CPT

## 2018-11-15 PROCEDURE — 36415 COLL VENOUS BLD VENIPUNCTURE: CPT

## 2018-11-15 PROCEDURE — 83036 HEMOGLOBIN GLYCOSYLATED A1C: CPT

## 2018-11-20 ENCOUNTER — TELEPHONE (OUTPATIENT)
Dept: INTERNAL MEDICINE CLINIC | Facility: CLINIC | Age: 78
End: 2018-11-20

## 2018-11-20 DIAGNOSIS — R73.02 GLUCOSE INTOLERANCE (IMPAIRED GLUCOSE TOLERANCE): Chronic | ICD-10-CM

## 2018-11-20 DIAGNOSIS — E78.1 HYPERTRIGLYCERIDEMIA: ICD-10-CM

## 2018-11-20 DIAGNOSIS — I10 ESSENTIAL HYPERTENSION, BENIGN: Primary | Chronic | ICD-10-CM

## 2018-11-21 NOTE — TELEPHONE ENCOUNTER
Notes recorded by Hang Triana MD on 11/17/2018 at 11:12 AM CST  Reviewed results   Hemoglobin A1c stable at 6.4 triglycerides are marginally elevated.  Could do better.  No increase in medicines needed at this moment.  Reinforce diet and weight loss re

## 2018-11-30 ENCOUNTER — OFFICE VISIT (OUTPATIENT)
Dept: INTERNAL MEDICINE CLINIC | Facility: CLINIC | Age: 78
End: 2018-11-30
Payer: MEDICARE

## 2018-11-30 VITALS
BODY MASS INDEX: 40 KG/M2 | TEMPERATURE: 98 F | RESPIRATION RATE: 20 BRPM | HEART RATE: 64 BPM | DIASTOLIC BLOOD PRESSURE: 80 MMHG | OXYGEN SATURATION: 96 % | SYSTOLIC BLOOD PRESSURE: 138 MMHG | WEIGHT: 227.5 LBS

## 2018-11-30 DIAGNOSIS — E78.1 HYPERTRIGLYCERIDEMIA: ICD-10-CM

## 2018-11-30 DIAGNOSIS — Z00.00 ROUTINE GENERAL MEDICAL EXAMINATION AT A HEALTH CARE FACILITY: ICD-10-CM

## 2018-11-30 DIAGNOSIS — R73.02 GLUCOSE INTOLERANCE (IMPAIRED GLUCOSE TOLERANCE): Primary | ICD-10-CM

## 2018-11-30 PROCEDURE — 99213 OFFICE O/P EST LOW 20 MIN: CPT | Performed by: INTERNAL MEDICINE

## 2018-11-30 RX ORDER — METFORMIN HYDROCHLORIDE 750 MG/1
750 TABLET, EXTENDED RELEASE ORAL 2 TIMES DAILY WITH MEALS
Qty: 180 TABLET | Refills: 3 | COMMUNITY
Start: 2018-11-30 | End: 2019-02-14

## 2018-11-30 NOTE — PROGRESS NOTES
Lidia Gary  3/4/1940 is a 69year old female. Patient presents with: Follow - Up      HPI:   DIABETES MELLITUS:   The diet that the patient has been following is the American Diabetes Association diet.    The frequency of the monitoring schedule is • Osteoarthrosis, unspecified whether generalized or localized, unspecified site    • Other and unspecified hyperlipidemia    • PONV (postoperative nausea and vomiting)     slow to awaken   • S/P total knee replacement using cement, left 1/13/2016   • Se facility  -     CBC WITH DIFFERENTIAL WITH PLATELET; Future  -     COMP METABOLIC PANEL (14); Future  -     HEMOGLOBIN A1C; Future  -     LIPID PANEL; Future  -     T4(THYROXINE TOTAL);  Future  -     ASSAY, THYROID STIM HORMONE; Future  -     URINALYSIS, R

## 2018-12-10 RX ORDER — BENAZEPRIL HYDROCHLORIDE AND HYDROCHLOROTHIAZIDE 20; 12.5 MG/1; MG/1
TABLET ORAL
Qty: 90 TABLET | Refills: 0 | Status: SHIPPED | OUTPATIENT
Start: 2018-12-10 | End: 2019-03-14

## 2018-12-10 NOTE — TELEPHONE ENCOUNTER
Refill requested:   Requested Prescriptions     Pending Prescriptions Disp Refills   • BENAZEPRIL-HYDROCHLOROTHIAZIDE 20-12.5 MG Oral Tab [Pharmacy Med Name: BENAZEPRIL/HCTZ 20/12.5MG TABLETS] 90 tablet 0     Sig: TAKE 1 TABLET BY MOUTH DAILY         Passe

## 2018-12-17 DIAGNOSIS — R73.02 GLUCOSE INTOLERANCE (IMPAIRED GLUCOSE TOLERANCE): Chronic | ICD-10-CM

## 2018-12-17 RX ORDER — METFORMIN HYDROCHLORIDE 750 MG/1
TABLET, EXTENDED RELEASE ORAL
Qty: 90 TABLET | Refills: 1 | Status: SHIPPED | OUTPATIENT
Start: 2018-12-17 | End: 2019-02-14

## 2018-12-17 NOTE — TELEPHONE ENCOUNTER
Refill requested:   Requested Prescriptions     Pending Prescriptions Disp Refills   • METFORMIN HCL  MG Oral Tablet 24 Hr [Pharmacy Med Name: METFORMIN ER 750MG  TAB] 90 tablet 1     Sig: TAKE 1 TABLET BY MOUTH ONCE DAILY       Failed protocol    Stephen Osei

## 2018-12-27 RX ORDER — ALENDRONATE SODIUM 70 MG/1
TABLET ORAL
Qty: 12 TABLET | Refills: 1 | Status: SHIPPED | OUTPATIENT
Start: 2018-12-27 | End: 2019-06-26

## 2018-12-27 NOTE — TELEPHONE ENCOUNTER
Protocol failed - DEXA scan within past 2 years    Medication(s) to Refill:   Requested Prescriptions     Pending Prescriptions Disp Refills   • ALENDRONATE 70 MG Oral Tab [Pharmacy Med Name: ALENDRONATE 70MG    TAB] 12 tablet 1     Sig: TAKE 1 TABLET BY M

## 2019-01-07 RX ORDER — FENOFIBRATE 160 MG/1
TABLET ORAL
Qty: 90 TABLET | Refills: 1 | Status: SHIPPED | OUTPATIENT
Start: 2019-01-07 | End: 2019-06-26

## 2019-01-07 NOTE — TELEPHONE ENCOUNTER
Passed Protocol    Medication(s) to Refill:   Requested Prescriptions     Pending Prescriptions Disp Refills   • FENOFIBRATE 160 MG Oral Tab [Pharmacy Med Name: FENOFIBRATE 160MG   TAB] 90 tablet 1     Sig: TAKE 1 TABLET BY MOUTH ONCE DAILY       Last Time

## 2019-01-21 NOTE — TELEPHONE ENCOUNTER
Refill requested:   Requested Prescriptions     Pending Prescriptions Disp Refills   • METOPROLOL TARTRATE 25 MG Oral Tab [Pharmacy Med Name: Adarsh Eaton TAR 25MG   TAB] 180 tablet 0     Sig: TAKE 1 TABLET BY MOUTH TWICE DAILY         Passed protocol    Last

## 2019-02-14 ENCOUNTER — OFFICE VISIT (OUTPATIENT)
Dept: INTERNAL MEDICINE CLINIC | Facility: CLINIC | Age: 79
End: 2019-02-14
Payer: MEDICARE

## 2019-02-14 VITALS
TEMPERATURE: 98 F | WEIGHT: 222.5 LBS | RESPIRATION RATE: 16 BRPM | BODY MASS INDEX: 39 KG/M2 | DIASTOLIC BLOOD PRESSURE: 78 MMHG | SYSTOLIC BLOOD PRESSURE: 146 MMHG | HEART RATE: 60 BPM | OXYGEN SATURATION: 97 %

## 2019-02-14 DIAGNOSIS — Z00.00 LABORATORY EXAMINATION ORDERED AS PART OF A ROUTINE GENERAL MEDICAL EXAMINATION: ICD-10-CM

## 2019-02-14 DIAGNOSIS — R73.02 GLUCOSE INTOLERANCE (IMPAIRED GLUCOSE TOLERANCE): Chronic | ICD-10-CM

## 2019-02-14 DIAGNOSIS — N30.01 ACUTE CYSTITIS WITH HEMATURIA: Primary | ICD-10-CM

## 2019-02-14 PROCEDURE — 99213 OFFICE O/P EST LOW 20 MIN: CPT | Performed by: INTERNAL MEDICINE

## 2019-02-14 RX ORDER — METFORMIN HYDROCHLORIDE 750 MG/1
750 TABLET, EXTENDED RELEASE ORAL 2 TIMES DAILY WITH MEALS
Qty: 180 TABLET | Refills: 3 | Status: SHIPPED | OUTPATIENT
Start: 2019-02-14 | End: 2019-08-27

## 2019-02-14 RX ORDER — CIPROFLOXACIN 500 MG/1
500 TABLET, FILM COATED ORAL 2 TIMES DAILY
Qty: 20 TABLET | Refills: 0 | Status: SHIPPED | OUTPATIENT
Start: 2019-02-14 | End: 2019-02-24

## 2019-02-14 NOTE — PROGRESS NOTES
Iesha Velez  3/4/1940  is a 69year old female. Patient presents with:  Kidney Problem      HPI:   Patient presents with symptoms of UTI 2 days some burning took some over-the-counter Pyridium.   Currently feels a lot better    Current Outpatient Med stricture due to infection    • Urinary incontinence       Social History:  Social History    Tobacco Use      Smoking status: Never Smoker      Smokeless tobacco: Never Used    Alcohol use:  Yes      Alcohol/week: 0.0 oz      Comment: RARE-WINE COOLER 1-2 intake  The patient indicates understanding of these issues and agrees to the plan. The patient is asked to return in Return in about 4 weeks (around 3/14/2019), or cpx .      Kiara Horton MD

## 2019-03-12 ENCOUNTER — LAB ENCOUNTER (OUTPATIENT)
Dept: LAB | Age: 79
End: 2019-03-12
Attending: INTERNAL MEDICINE
Payer: MEDICARE

## 2019-03-12 DIAGNOSIS — Z00.00 ROUTINE GENERAL MEDICAL EXAMINATION AT A HEALTH CARE FACILITY: ICD-10-CM

## 2019-03-12 DIAGNOSIS — Z00.00 LABORATORY EXAMINATION ORDERED AS PART OF A ROUTINE GENERAL MEDICAL EXAMINATION: ICD-10-CM

## 2019-03-12 LAB
ALBUMIN SERPL-MCNC: 4.1 G/DL (ref 3.4–5)
ALBUMIN/GLOB SERPL: 1.2 {RATIO} (ref 1–2)
ALP LIVER SERPL-CCNC: 37 U/L (ref 55–142)
ALT SERPL-CCNC: 26 U/L (ref 13–56)
ANION GAP SERPL CALC-SCNC: 10 MMOL/L (ref 0–18)
AST SERPL-CCNC: 22 U/L (ref 15–37)
BASOPHILS # BLD AUTO: 0.05 X10(3) UL (ref 0–0.2)
BASOPHILS NFR BLD AUTO: 0.7 %
BILIRUB SERPL-MCNC: 0.4 MG/DL (ref 0.1–2)
BILIRUB UR QL STRIP.AUTO: NEGATIVE
BUN BLD-MCNC: 22 MG/DL (ref 7–18)
BUN/CREAT SERPL: 21 (ref 10–20)
CALCIUM BLD-MCNC: 10.4 MG/DL (ref 8.5–10.1)
CHLORIDE SERPL-SCNC: 105 MMOL/L (ref 98–107)
CHOLEST SMN-MCNC: 179 MG/DL (ref ?–200)
CLARITY UR REFRACT.AUTO: CLEAR
CO2 SERPL-SCNC: 27 MMOL/L (ref 21–32)
COLOR UR AUTO: YELLOW
CREAT BLD-MCNC: 1.05 MG/DL (ref 0.55–1.02)
CREAT UR-SCNC: 161 MG/DL
DEPRECATED RDW RBC AUTO: 43.7 FL (ref 35.1–46.3)
EOSINOPHIL # BLD AUTO: 0.22 X10(3) UL (ref 0–0.7)
EOSINOPHIL NFR BLD AUTO: 3.1 %
ERYTHROCYTE [DISTWIDTH] IN BLOOD BY AUTOMATED COUNT: 13.4 % (ref 11–15)
EST. AVERAGE GLUCOSE BLD GHB EST-MCNC: 134 MG/DL (ref 68–126)
GLOBULIN PLAS-MCNC: 3.3 G/DL (ref 2.8–4.4)
GLUCOSE BLD-MCNC: 110 MG/DL (ref 70–99)
GLUCOSE UR STRIP.AUTO-MCNC: NEGATIVE MG/DL
HBA1C MFR BLD HPLC: 6.3 % (ref ?–5.7)
HCT VFR BLD AUTO: 40.2 % (ref 35–48)
HDLC SERPL-MCNC: 34 MG/DL (ref 40–59)
HGB BLD-MCNC: 12.7 G/DL (ref 12–16)
IMM GRANULOCYTES # BLD AUTO: 0.03 X10(3) UL (ref 0–1)
IMM GRANULOCYTES NFR BLD: 0.4 %
KETONES UR STRIP.AUTO-MCNC: NEGATIVE MG/DL
LDLC SERPL CALC-MCNC: 93 MG/DL (ref ?–100)
LEUKOCYTE ESTERASE UR QL STRIP.AUTO: NEGATIVE
LYMPHOCYTES # BLD AUTO: 2.62 X10(3) UL (ref 1–4)
LYMPHOCYTES NFR BLD AUTO: 37 %
M PROTEIN MFR SERPL ELPH: 7.4 G/DL (ref 6.4–8.2)
MCH RBC QN AUTO: 27.9 PG (ref 26–34)
MCHC RBC AUTO-ENTMCNC: 31.6 G/DL (ref 31–37)
MCV RBC AUTO: 88.4 FL (ref 80–100)
MICROALBUMIN UR-MCNC: 0.58 MG/DL
MICROALBUMIN/CREAT 24H UR-RTO: 3.6 UG/MG (ref ?–30)
MONOCYTES # BLD AUTO: 0.82 X10(3) UL (ref 0.1–1)
MONOCYTES NFR BLD AUTO: 11.6 %
NEUTROPHILS # BLD AUTO: 3.34 X10 (3) UL (ref 1.5–7.7)
NEUTROPHILS # BLD AUTO: 3.34 X10(3) UL (ref 1.5–7.7)
NEUTROPHILS NFR BLD AUTO: 47.2 %
NITRITE UR QL STRIP.AUTO: NEGATIVE
NONHDLC SERPL-MCNC: 145 MG/DL (ref ?–130)
OSMOLALITY SERPL CALC.SUM OF ELEC: 298 MOSM/KG (ref 275–295)
PH UR STRIP.AUTO: 6 [PH] (ref 4.5–8)
PLATELET # BLD AUTO: 254 10(3)UL (ref 150–450)
POTASSIUM SERPL-SCNC: 4.3 MMOL/L (ref 3.5–5.1)
PROT UR STRIP.AUTO-MCNC: NEGATIVE MG/DL
RBC # BLD AUTO: 4.55 X10(6)UL (ref 3.8–5.3)
RBC UR QL AUTO: NEGATIVE
SODIUM SERPL-SCNC: 142 MMOL/L (ref 136–145)
SP GR UR STRIP.AUTO: 1.02 (ref 1–1.03)
T4 SERPL-MCNC: 7.7 UG/DL (ref 4.8–13.9)
TRIGL SERPL-MCNC: 262 MG/DL (ref 30–149)
TSI SER-ACNC: 2.5 MIU/ML (ref 0.36–3.74)
UROBILINOGEN UR STRIP.AUTO-MCNC: 2 MG/DL
VLDLC SERPL CALC-MCNC: 52 MG/DL (ref 0–30)
WBC # BLD AUTO: 7.1 X10(3) UL (ref 4–11)

## 2019-03-12 PROCEDURE — 82570 ASSAY OF URINE CREATININE: CPT

## 2019-03-12 PROCEDURE — 36415 COLL VENOUS BLD VENIPUNCTURE: CPT

## 2019-03-12 PROCEDURE — 84443 ASSAY THYROID STIM HORMONE: CPT

## 2019-03-12 PROCEDURE — 81003 URINALYSIS AUTO W/O SCOPE: CPT

## 2019-03-12 PROCEDURE — 80061 LIPID PANEL: CPT

## 2019-03-12 PROCEDURE — 80053 COMPREHEN METABOLIC PANEL: CPT

## 2019-03-12 PROCEDURE — 85025 COMPLETE CBC W/AUTO DIFF WBC: CPT

## 2019-03-12 PROCEDURE — 84436 ASSAY OF TOTAL THYROXINE: CPT

## 2019-03-12 PROCEDURE — 82043 UR ALBUMIN QUANTITATIVE: CPT

## 2019-03-12 PROCEDURE — 83036 HEMOGLOBIN GLYCOSYLATED A1C: CPT

## 2019-03-14 RX ORDER — BENAZEPRIL HYDROCHLORIDE AND HYDROCHLOROTHIAZIDE 20; 12.5 MG/1; MG/1
TABLET ORAL
Qty: 90 TABLET | Refills: 0 | Status: SHIPPED | OUTPATIENT
Start: 2019-03-14 | End: 2019-06-12

## 2019-03-14 NOTE — TELEPHONE ENCOUNTER
Benazepril-HCTZ approved per protocol  Last OV relevant to medication: 9-17-18  Last refill date: 12-10-18   #/refills: 0  When pt was asked to return for OV: 3 months (around 12-17-18)  Upcoming appt/reason: 3-28-19  Recent labs: 3-12-19: Microalb./ UA/ T

## 2019-03-19 DIAGNOSIS — R73.02 GLUCOSE INTOLERANCE (IMPAIRED GLUCOSE TOLERANCE): Chronic | ICD-10-CM

## 2019-03-19 RX ORDER — METFORMIN HYDROCHLORIDE 750 MG/1
TABLET, EXTENDED RELEASE ORAL
Qty: 90 TABLET | Refills: 1 | Status: SHIPPED | OUTPATIENT
Start: 2019-03-19 | End: 2019-03-28 | Stop reason: DRUGHIGH

## 2019-03-19 NOTE — TELEPHONE ENCOUNTER
Refill requested:   Requested Prescriptions     Pending Prescriptions Disp Refills   • METFORMIN HCL  MG Oral Tablet 24 Hr [Pharmacy Med Name: METFORMIN ER 750MG  TAB] 90 tablet 1     Sig: TAKE 1 TABLET BY MOUTH ONCE DAILY         Passed protocol

## 2019-03-28 ENCOUNTER — OFFICE VISIT (OUTPATIENT)
Dept: INTERNAL MEDICINE CLINIC | Facility: CLINIC | Age: 79
End: 2019-03-28
Payer: MEDICARE

## 2019-03-28 VITALS
OXYGEN SATURATION: 97 % | DIASTOLIC BLOOD PRESSURE: 82 MMHG | WEIGHT: 224.75 LBS | RESPIRATION RATE: 16 BRPM | TEMPERATURE: 98 F | HEART RATE: 60 BPM | BODY MASS INDEX: 40.32 KG/M2 | HEIGHT: 62.5 IN | SYSTOLIC BLOOD PRESSURE: 160 MMHG

## 2019-03-28 DIAGNOSIS — I10 ESSENTIAL HYPERTENSION, BENIGN: ICD-10-CM

## 2019-03-28 DIAGNOSIS — Z12.31 ENCOUNTER FOR SCREENING MAMMOGRAM FOR MALIGNANT NEOPLASM OF BREAST: ICD-10-CM

## 2019-03-28 DIAGNOSIS — Z00.00 ROUTINE GENERAL MEDICAL EXAMINATION AT A HEALTH CARE FACILITY: Primary | ICD-10-CM

## 2019-03-28 PROCEDURE — G0439 PPPS, SUBSEQ VISIT: HCPCS | Performed by: INTERNAL MEDICINE

## 2019-03-28 PROCEDURE — 93000 ELECTROCARDIOGRAM COMPLETE: CPT | Performed by: INTERNAL MEDICINE

## 2019-03-28 PROCEDURE — 99213 OFFICE O/P EST LOW 20 MIN: CPT | Performed by: INTERNAL MEDICINE

## 2019-03-28 RX ORDER — AMLODIPINE BESYLATE 5 MG/1
5 TABLET ORAL 2 TIMES DAILY
Qty: 60 TABLET | Refills: 11 | COMMUNITY
Start: 2019-03-28 | End: 2019-04-25

## 2019-03-28 NOTE — PROGRESS NOTES
Romeo Knox [facility] 3/4/1940 is a 67year old female. Patient presents with: Well Adult: MWV      HPI:   No new complaints    Current Outpatient Medications:  amLODIPine Besylate 5 MG Oral Tab Take 1 tablet (5 mg total) by mouth 2 (two) times daily.  Disp: RARE-WINE COOLER 1-2 X YEARLY    Drug use: No       REVIEW OF SYSTEMS:     General/Constitutional:   General able to do usual activities, good exercise tolerance, good general state of health, no fatigue, no fever, no weakness, no weight loss or gain .    H claudication. Dermatologic:   Rash no. Neurologic:   Patient denies dizziness, fainting, loss of consciousness, weakness. Memory loss none. Tingling/numbness none. Trouble with balance none.    Psychiatric:   Patient denies depression, hallucinations, m negative/all reflexes are normal.   Cerebellar Testing grossly/intact: yes. Gait: normal.   Motor: power-normal/tone -normal/co-ordination normal/wasting -none/involuntary movements -none. Sensory: normal sensation to all modalities.    LYMPHATICS:   Gr

## 2019-03-28 NOTE — PATIENT INSTRUCTIONS
HTN  Monitor blood pressure twice a day and send or call office with readings. All labs discussed with patient. Patient to bring in all meds for verification.   Diet discretion and weight loss advised

## 2019-04-18 NOTE — TELEPHONE ENCOUNTER
Metoprolol 25 mg 1 tab bid filled 4-24-18 180 with 0 refills     LOV 3-28-19     Return in about 4 weeks (around 4/25/2019).     HTN  Monitor blood pressure twice a day and send or call office with readings    Next apt 4-30-19     Labs 3-12-19

## 2019-04-23 DIAGNOSIS — I10 ESSENTIAL HYPERTENSION, BENIGN: ICD-10-CM

## 2019-04-23 NOTE — TELEPHONE ENCOUNTER
New prescription needed -  changed dosing instructions to 5 mg BID    amLODIPine Besylate 5 MG Oral Tab    [id number]

## 2019-04-25 DIAGNOSIS — I10 ESSENTIAL HYPERTENSION, BENIGN: Chronic | ICD-10-CM

## 2019-04-25 RX ORDER — AMLODIPINE BESYLATE 5 MG/1
5 TABLET ORAL 2 TIMES DAILY
Qty: 60 TABLET | Refills: 11 | Status: SHIPPED | OUTPATIENT
Start: 2019-04-25 | End: 2020-04-21

## 2019-04-25 NOTE — TELEPHONE ENCOUNTER
Passed protocol    Medication(s) to Refill:   Requested Prescriptions     Pending Prescriptions Disp Refills   • amLODIPine Besylate 5 MG Oral Tab 60 tablet 11     Sig: Take 1 tablet (5 mg total) by mouth 2 (two) times daily.        Last Time Medication was

## 2019-04-26 RX ORDER — AMLODIPINE BESYLATE 5 MG/1
TABLET ORAL
Qty: 90 TABLET | Refills: 3 | Status: SHIPPED | OUTPATIENT
Start: 2019-04-26 | End: 2019-04-30

## 2019-04-26 NOTE — TELEPHONE ENCOUNTER
Last OV: 4/30/19 with Dr. Jarad Bermudez  Last refill date: 4/25/19     #/refills: #60, 11 refills    Requesting 90-day supply

## 2019-04-30 ENCOUNTER — OFFICE VISIT (OUTPATIENT)
Dept: INTERNAL MEDICINE CLINIC | Facility: CLINIC | Age: 79
End: 2019-04-30
Payer: MEDICARE

## 2019-04-30 VITALS
SYSTOLIC BLOOD PRESSURE: 134 MMHG | BODY MASS INDEX: 40.91 KG/M2 | RESPIRATION RATE: 16 BRPM | HEIGHT: 62.5 IN | TEMPERATURE: 98 F | DIASTOLIC BLOOD PRESSURE: 78 MMHG | HEART RATE: 64 BPM | WEIGHT: 228 LBS

## 2019-04-30 DIAGNOSIS — I10 ESSENTIAL HYPERTENSION, BENIGN: Primary | Chronic | ICD-10-CM

## 2019-04-30 DIAGNOSIS — R73.02 GLUCOSE INTOLERANCE (IMPAIRED GLUCOSE TOLERANCE): Chronic | ICD-10-CM

## 2019-04-30 DIAGNOSIS — E78.1 HYPERTRIGLYCERIDEMIA: Chronic | ICD-10-CM

## 2019-04-30 PROBLEM — R29.898 WEAKNESS OF RIGHT HAND: Chronic | Status: ACTIVE | Noted: 2018-04-18

## 2019-04-30 PROBLEM — R20.2 RIGHT HAND PARESTHESIA: Status: RESOLVED | Noted: 2018-05-30 | Resolved: 2019-04-30

## 2019-04-30 PROBLEM — G56.21 LESION OF RIGHT ULNAR NERVE: Status: RESOLVED | Noted: 2018-09-12 | Resolved: 2019-04-30

## 2019-04-30 PROBLEM — M19.049 ARTHRITIS OF HAND: Chronic | Status: ACTIVE | Noted: 2018-04-18

## 2019-04-30 PROBLEM — M67.431 GANGLION OF RIGHT WRIST: Status: RESOLVED | Noted: 2018-09-12 | Resolved: 2019-04-30

## 2019-04-30 PROCEDURE — 99213 OFFICE O/P EST LOW 20 MIN: CPT | Performed by: INTERNAL MEDICINE

## 2019-04-30 NOTE — PROGRESS NOTES
Honorio Gong [facility] 3/4/1940 is a 67year old female. Patient presents with:  Medication Follow-Up    bp check med verification    Current Outpatient Medications:  KRILL OIL OR Take by mouth.  Disp:  Rfl:    amLODIPine Besylate 5 MG Oral Tab Take 1 tablet use: Yes      Alcohol/week: 0.0 oz      Comment: RARE-WINE COOLER 1-2 X YEARLY    Drug use: No       REVIEW OF SYSTEMS:   Cardiovascular:   Syncope none. Rapid heart beat at rest no. Change in exercise tolerance no. Chest pain no.  Chest pain while awake no

## 2019-06-13 RX ORDER — BENAZEPRIL HYDROCHLORIDE AND HYDROCHLOROTHIAZIDE 20; 12.5 MG/1; MG/1
TABLET ORAL
Qty: 90 TABLET | Refills: 0 | Status: SHIPPED | OUTPATIENT
Start: 2019-06-13 | End: 2019-09-10

## 2019-06-13 NOTE — TELEPHONE ENCOUNTER
Last OV: 4/30/19 with Dr. Jarad Bermudez  Last refill date: 3/14/19      #/refills: #90, 0 refills  When pt was asked to return for OV: 4 months  Upcoming appt: 8/27/19 with Dr. Jarad Bermudez  Last labs 3/12/19

## 2019-06-26 NOTE — TELEPHONE ENCOUNTER
Refill requested:   Requested Prescriptions     Pending Prescriptions Disp Refills   • ALENDRONATE 70 MG Oral Tab [Pharmacy Med Name: ALENDRONATE 70MG    TAB] 12 tablet 1     Sig: TAKE 1 TABLET BY MOUTH ONCE A WEEK   • FENOFIBRATE 160 MG Oral Tab [Pharmacy

## 2019-06-28 RX ORDER — ALENDRONATE SODIUM 70 MG/1
TABLET ORAL
Qty: 12 TABLET | Refills: 1 | Status: SHIPPED | OUTPATIENT
Start: 2019-06-28 | End: 2019-12-12

## 2019-06-28 RX ORDER — FENOFIBRATE 160 MG/1
TABLET ORAL
Qty: 90 TABLET | Refills: 1 | Status: SHIPPED | OUTPATIENT
Start: 2019-06-28 | End: 2019-12-24

## 2019-07-18 NOTE — TELEPHONE ENCOUNTER
METOPROLOL TARTRATE 25 MG     Last OV relevant to medication: 4/30/2019    Last refill date: 4/24/2018     When pt was asked to return for OV: 4 months     Upcoming appt/reason: none     Labs: 3-

## 2019-07-25 ENCOUNTER — APPOINTMENT (OUTPATIENT)
Dept: LAB | Age: 79
End: 2019-07-25
Attending: INTERNAL MEDICINE
Payer: MEDICARE

## 2019-07-25 DIAGNOSIS — I10 ESSENTIAL HYPERTENSION, BENIGN: Chronic | ICD-10-CM

## 2019-07-25 DIAGNOSIS — R73.02 GLUCOSE INTOLERANCE (IMPAIRED GLUCOSE TOLERANCE): Chronic | ICD-10-CM

## 2019-07-25 DIAGNOSIS — E78.1 HYPERTRIGLYCERIDEMIA: ICD-10-CM

## 2019-07-25 LAB
ALBUMIN SERPL-MCNC: 3.9 G/DL (ref 3.4–5)
ALBUMIN/GLOB SERPL: 1.1 {RATIO} (ref 1–2)
ALP LIVER SERPL-CCNC: 37 U/L (ref 55–142)
ALT SERPL-CCNC: 30 U/L (ref 13–56)
ANION GAP SERPL CALC-SCNC: 7 MMOL/L (ref 0–18)
AST SERPL-CCNC: 22 U/L (ref 15–37)
BILIRUB SERPL-MCNC: 0.4 MG/DL (ref 0.1–2)
BUN BLD-MCNC: 21 MG/DL (ref 7–18)
BUN/CREAT SERPL: 20.6 (ref 10–20)
CALCIUM BLD-MCNC: 10.1 MG/DL (ref 8.5–10.1)
CHLORIDE SERPL-SCNC: 106 MMOL/L (ref 98–112)
CHOLEST SMN-MCNC: 177 MG/DL (ref ?–200)
CO2 SERPL-SCNC: 25 MMOL/L (ref 21–32)
CREAT BLD-MCNC: 1.02 MG/DL (ref 0.55–1.02)
EST. AVERAGE GLUCOSE BLD GHB EST-MCNC: 140 MG/DL (ref 68–126)
GLOBULIN PLAS-MCNC: 3.5 G/DL (ref 2.8–4.4)
GLUCOSE BLD-MCNC: 112 MG/DL (ref 70–99)
HBA1C MFR BLD HPLC: 6.5 % (ref ?–5.7)
HDLC SERPL-MCNC: 34 MG/DL (ref 40–59)
LDLC SERPL CALC-MCNC: 93 MG/DL (ref ?–100)
M PROTEIN MFR SERPL ELPH: 7.4 G/DL (ref 6.4–8.2)
NONHDLC SERPL-MCNC: 143 MG/DL (ref ?–130)
OSMOLALITY SERPL CALC.SUM OF ELEC: 290 MOSM/KG (ref 275–295)
POTASSIUM SERPL-SCNC: 3.8 MMOL/L (ref 3.5–5.1)
SODIUM SERPL-SCNC: 138 MMOL/L (ref 136–145)
TRIGL SERPL-MCNC: 249 MG/DL (ref 30–149)
VLDLC SERPL CALC-MCNC: 50 MG/DL (ref 0–30)

## 2019-07-25 PROCEDURE — 36415 COLL VENOUS BLD VENIPUNCTURE: CPT

## 2019-07-25 PROCEDURE — 83036 HEMOGLOBIN GLYCOSYLATED A1C: CPT

## 2019-07-25 PROCEDURE — 80053 COMPREHEN METABOLIC PANEL: CPT

## 2019-07-25 PROCEDURE — 80061 LIPID PANEL: CPT

## 2019-08-19 ENCOUNTER — TELEPHONE (OUTPATIENT)
Dept: INTERNAL MEDICINE CLINIC | Facility: CLINIC | Age: 79
End: 2019-08-19

## 2019-08-19 NOTE — TELEPHONE ENCOUNTER
Offered OV with KR tomorrow. Pt agreeable and OV scheduled. Future Appointments   Date Time Provider Isiah Rodríguez   8/20/2019  9:30 AM PADMINI Rust [facility] [place]     Advised pt to go to [facility] if pain worsens.  Pt verbalized understand

## 2019-08-19 NOTE — TELEPHONE ENCOUNTER
Patient stated that she was pulling up plants in her yard and hurt her back and legs. She would like to get in to see Dr. John Blair tomorrow in the morning. No available appointments. Please advise.

## 2019-08-20 ENCOUNTER — OFFICE VISIT (OUTPATIENT)
Dept: INTERNAL MEDICINE CLINIC | Facility: CLINIC | Age: 79
End: 2019-08-20
Payer: MEDICARE

## 2019-08-20 VITALS
HEART RATE: 60 BPM | BODY MASS INDEX: 40.55 KG/M2 | SYSTOLIC BLOOD PRESSURE: 148 MMHG | RESPIRATION RATE: 16 BRPM | OXYGEN SATURATION: 97 % | DIASTOLIC BLOOD PRESSURE: 68 MMHG | HEIGHT: 62.5 IN | TEMPERATURE: 98 F | WEIGHT: 226 LBS

## 2019-08-20 DIAGNOSIS — L23.7 POISON IVY DERMATITIS: Primary | ICD-10-CM

## 2019-08-20 DIAGNOSIS — S76.211A GROIN STRAIN, RIGHT, INITIAL ENCOUNTER: ICD-10-CM

## 2019-08-20 PROCEDURE — 99214 OFFICE O/P EST MOD 30 MIN: CPT | Performed by: NURSE PRACTITIONER

## 2019-08-20 RX ORDER — METHYLPREDNISOLONE 4 MG/1
TABLET ORAL
Qty: 1 KIT | Refills: 0 | Status: SHIPPED | OUTPATIENT
Start: 2019-08-20 | End: 2019-08-27 | Stop reason: ALTCHOICE

## 2019-08-20 NOTE — PROGRESS NOTES
CHIEF COMPLAINT:     Patient presents with:  Pain: Pt states she has groin pain, that radiates to back and affects legs. Pt states she has a  that cannot do anything, but she has to do all moving (assists him in moving).     Rash: Pt has rash from po MetFORMIN HCl  MG Oral Tablet 24 Hr Take 1 tablet (750 mg total) by mouth 2 (two) times daily with meals. Disp: 180 tablet Rfl: 3   Multiple Vitamins-Minerals (ICAPS AREDS FORMULA) Oral Tab Take 2 each by mouth 2 (two) times daily.  Disp:  Rfl:    Cho GENERAL: well developed, well nourished, in no apparent distress  SKIN: linear maculopapular rash to bilateral forearms with excoriations from scratching   LUNGS: clear to auscultation  CARDIO: RRR without murmur  GI: no tenderness  MS: Pain along right gr · You may use over-the-counter pain medicine to control pain, unless another pain medicine was prescribed.  If you have chronic liver or kidney disease or ever had a stomach ulcer or GI (gastrointestinal) bleeding, talk with your healthcare provider before · The plant oils still on your skin or clothes can be spread to other places on your body. They can also be passed on to other people and cause a similar reaction.  That’s why it’s important to wash all of the plant oils off your skin and any clothes that m · You can also use an oral antihistamine medicine with diphenhydramine for itching, unless another medicine was prescribed. This medicine may make you sleepy. So use lower doses during the daytime and higher doses at bedtime.  Don’t use medicine that has [employer]

## 2019-08-20 NOTE — PATIENT INSTRUCTIONS
Groin Strain (Adult)  A groin strain is a stretching or partial tearing of the muscle in the lower belly (abdomen) or upper thigh. This may happen because of too much coughing, heavy lifting, or active sports.  The pain may last for several days or weeks, © 8483-3310 [employer] [address]. [address] [facility] [place], [address]. All rights reserved. This information is not intended as a substitute for professional medical care. Always follow your healthcare professional's instructions.         Poison · For a rash in a smaller area, use hydrocortisone cream for redness and irritation. But don’t use this if another medicine was prescribed. For severe itching, put an ice pack on the area.  To make an ice pack, put ice cubes in a plastic bag that seals at t © 3936-2103 [employer]. 1407 [facility] [place], [facility] [place]. All rights reserved. This information is not intended as a substitute for professional medical care. Always follow your healthcare professional's instructions.         General · Watch your blood sugar as you are told to. Keep a log of your results. This will help your provider change your medicines to keep your blood sugar under control. · Try to reach your ideal weight.  You may be able to cut back on or not have to take diabet · Keep taking your insulin even if you have been vomiting and are feeling sick. Call your provider right away to ask if you need to change your insulin dose. This will depend on your blood sugar results.   · Check your blood sugar every 2 to 4 hours, or at Follow-up with your healthcare provider, or as advised. For more information about diabetes, visit the American Diabetes Association website at www. diabetes. org. Or you can call [phone].   When to seek medical advice  Call your healthcare provider matias

## 2019-08-27 ENCOUNTER — OFFICE VISIT (OUTPATIENT)
Dept: INTERNAL MEDICINE CLINIC | Facility: CLINIC | Age: 79
End: 2019-08-27
Payer: MEDICARE

## 2019-08-27 VITALS
WEIGHT: 224.25 LBS | DIASTOLIC BLOOD PRESSURE: 76 MMHG | OXYGEN SATURATION: 98 % | HEART RATE: 64 BPM | TEMPERATURE: 98 F | HEIGHT: 62.5 IN | RESPIRATION RATE: 16 BRPM | SYSTOLIC BLOOD PRESSURE: 136 MMHG | BODY MASS INDEX: 40.24 KG/M2

## 2019-08-27 DIAGNOSIS — R73.02 GLUCOSE INTOLERANCE (IMPAIRED GLUCOSE TOLERANCE): Primary | ICD-10-CM

## 2019-08-27 DIAGNOSIS — E78.1 HYPERTRIGLYCERIDEMIA: ICD-10-CM

## 2019-08-27 PROBLEM — M19.049 ARTHRITIS OF HAND: Chronic | Status: RESOLVED | Noted: 2018-04-18 | Resolved: 2019-08-27

## 2019-08-27 PROBLEM — R29.898 WEAKNESS OF RIGHT HAND: Chronic | Status: RESOLVED | Noted: 2018-04-18 | Resolved: 2019-08-27

## 2019-08-27 PROCEDURE — 99213 OFFICE O/P EST LOW 20 MIN: CPT | Performed by: INTERNAL MEDICINE

## 2019-08-27 RX ORDER — METFORMIN HYDROCHLORIDE 500 MG/1
1000 TABLET, EXTENDED RELEASE ORAL 2 TIMES DAILY WITH MEALS
Qty: 360 TABLET | Refills: 3 | Status: SHIPPED | OUTPATIENT
Start: 2019-08-27 | End: 2020-06-08 | Stop reason: RX

## 2019-08-27 RX ORDER — METFORMIN HYDROCHLORIDE 1000 MG/1
1000 TABLET, FILM COATED, EXTENDED RELEASE ORAL 2 TIMES DAILY WITH MEALS
Qty: 180 TABLET | Refills: 3 | Status: SHIPPED | OUTPATIENT
Start: 2019-08-27 | End: 2019-08-27

## 2019-08-27 NOTE — PROGRESS NOTES
Andra Shah  3/4/1940 is a 67year old female. Patient presents with:  Hypertension      HPI:   DIABETES MELLITUS:   The diet that the patient has been following is the American Diabetes Association diet.    The frequency of the monitoring schedule i unspecified osteoarthritis type 1/13/2016   • Osteoarthrosis, unspecified whether generalized or localized, unspecified site    • Other and unspecified hyperlipidemia    • PONV (postoperative nausea and vomiting)     slow to awaken   • S/P total knee repla visit:    Glucose intolerance (impaired glucose tolerance)  -     metFORMIN HCl ER 1000 MG (MOD) Oral Tablet 24 Hr; Take 1 tablet (1,000 mg total) by mouth 2 (two) times daily with meals.     Hypertriglyceridemia       Labs discussed with patient        Joyce Live

## 2019-08-27 NOTE — TELEPHONE ENCOUNTER
Per pharmacist [facility] pt's insurance does not cover metformin ER 1000 mg. He stated it will be covered if it is metformin  mg 2 tablets BID. Medication pending if ok.  TY

## 2019-09-11 RX ORDER — BENAZEPRIL HYDROCHLORIDE AND HYDROCHLOROTHIAZIDE 20; 12.5 MG/1; MG/1
TABLET ORAL
Qty: 90 TABLET | Refills: 0 | Status: SHIPPED | OUTPATIENT
Start: 2019-09-11 | End: 2019-12-10

## 2019-09-11 NOTE — TELEPHONE ENCOUNTER
Passed protocol    Medication(s) to Refill:   Requested Prescriptions     Pending Prescriptions Disp Refills   • BENAZEPRIL-HYDROCHLOROTHIAZIDE 20-12.5 MG Oral Tab [Pharmacy Med Name: BENAZEPRIL/HCTZ 20/12.5MG TABLETS] 90 tablet 0     Sig: TAKE 1 TABLET BY

## 2019-10-16 NOTE — TELEPHONE ENCOUNTER
Last OV: 8/27/19 with Dr. Hilda Garza  Last refill date: 7/18/19     #/refills: #180, 0 refills  When pt was asked to return for OV: 6 months  Upcoming appt: 3/31/2020 with Dr. Hilda Garza  Last labs 7/25/19

## 2019-11-12 ENCOUNTER — OFFICE VISIT (OUTPATIENT)
Dept: INTERNAL MEDICINE CLINIC | Facility: CLINIC | Age: 79
End: 2019-11-12
Payer: MEDICARE

## 2019-11-12 VITALS
HEIGHT: 62.5 IN | DIASTOLIC BLOOD PRESSURE: 78 MMHG | HEART RATE: 74 BPM | OXYGEN SATURATION: 95 % | TEMPERATURE: 98 F | SYSTOLIC BLOOD PRESSURE: 160 MMHG | WEIGHT: 219.25 LBS | BODY MASS INDEX: 39.34 KG/M2

## 2019-11-12 DIAGNOSIS — M54.50 ACUTE MIDLINE LOW BACK PAIN WITHOUT SCIATICA: Primary | ICD-10-CM

## 2019-11-12 PROCEDURE — 99213 OFFICE O/P EST LOW 20 MIN: CPT | Performed by: INTERNAL MEDICINE

## 2019-11-12 RX ORDER — CYCLOBENZAPRINE HCL 5 MG
5 TABLET ORAL NIGHTLY
Qty: 15 TABLET | Refills: 0 | Status: SHIPPED | OUTPATIENT
Start: 2019-11-12 | End: 2019-11-27

## 2019-11-12 NOTE — PROGRESS NOTES
Lynnette Emelina HOWARD 3/4/1940 is a 67year old female.     Patient presents with:  Back Pain      HPI:   C/o of low back pain for few days -patient apparently lost her footing slipped and twisted her back however did not take her direct impact  Current Outpatie total knee replacement using cement, left 1/13/2016   • Senile osteoporosis    • Unspecified essential hypertension    • Urethral stricture due to infection    • Urinary incontinence    • Weakness of right hand 4/18/2018      Social History:  Social Histor bilateral lower extremities. SENSORY EXAM: normal bilateral lower extremity, pinprick sensation is normal in the lower extremities. REFLEXES: bilaterally symmetrical, babinski negative.    GAIT: normal.   ABDOMEN:   Bowel sounds: normal.   General: norm

## 2019-12-12 RX ORDER — BENAZEPRIL HYDROCHLORIDE AND HYDROCHLOROTHIAZIDE 20; 12.5 MG/1; MG/1
TABLET ORAL
Qty: 90 TABLET | Refills: 0 | Status: SHIPPED | OUTPATIENT
Start: 2019-12-12 | End: 2020-03-03

## 2019-12-12 RX ORDER — ALENDRONATE SODIUM 70 MG/1
TABLET ORAL
Refills: 0 | OUTPATIENT
Start: 2019-12-12

## 2019-12-12 RX ORDER — ALENDRONATE SODIUM 70 MG/1
TABLET ORAL
Qty: 12 TABLET | Refills: 1 | Status: SHIPPED | OUTPATIENT
Start: 2019-12-12 | End: 2020-06-10

## 2019-12-12 NOTE — TELEPHONE ENCOUNTER
Protocol failed - DEXA scan within past 2 years - Last DEXA 12/24/14    Requesting ALENDRONATE 70 MG Oral Tab  LOV: 11/12/19  RTC: 4 weeks  Last Relevant Labs: 7/25/19  Filled: 6/28/19 #12 with 1 refills    Future Appointments   Date Time Provider Isiah Rodríguez   1/7/2020  9:15 AM Babak Beaulieu MD [facility] AT [facility]   3/31/2020  9:00 AM Al[facility] De [facility]

## 2019-12-12 NOTE — TELEPHONE ENCOUNTER
Passed protocol    Requesting BENAZEPRIL-HYDROCHLOROTHIAZIDE 20-12.5 MG Oral Tab  LOV: 11/12/19  RTC: 4 weeks  Last Relevant Labs: 7/25/19  Filled: 9/11/19 #90 with 0 refills    Future Appointments   Date Time Provider Isiah Rodríguez   1/7/2020  9:15 AM

## 2019-12-24 RX ORDER — FENOFIBRATE 160 MG/1
TABLET ORAL
Qty: 90 TABLET | Refills: 0 | Status: SHIPPED | OUTPATIENT
Start: 2019-12-24 | End: 2020-03-18

## 2019-12-24 NOTE — TELEPHONE ENCOUNTER
FENOFIBRATE 160 MG Oral Tab    Last OV relevant to medication: 11/12/2019     Last refill date: 6/28/2019 # 90 tabs with 1 refill     When pt was asked to return for OV: 4 weeks    Upcoming appt/reason: 3/21/2020    Labs: 7-

## 2019-12-27 ENCOUNTER — TELEPHONE (OUTPATIENT)
Dept: INTERNAL MEDICINE CLINIC | Facility: CLINIC | Age: 79
End: 2019-12-27

## 2020-01-14 DIAGNOSIS — M54.50 ACUTE MIDLINE LOW BACK PAIN WITHOUT SCIATICA: ICD-10-CM

## 2020-01-15 NOTE — TELEPHONE ENCOUNTER
METOPROLOL TARTRATE 25 MG Oral Tab and DICLOFENAC SODIUM 50 MG Oral Tab EC ( Non Protocol medication)     Last OV relevant to medication: 11-     Last refill date:   METOPROLOL TARTRATE 25 MG--- 10- # 180 tabs with 0 refills      DICLOFENAC S

## 2020-03-03 RX ORDER — BENAZEPRIL HYDROCHLORIDE AND HYDROCHLOROTHIAZIDE 20; 12.5 MG/1; MG/1
TABLET ORAL
Qty: 90 TABLET | Refills: 0 | Status: SHIPPED | OUTPATIENT
Start: 2020-03-03 | End: 2020-06-04

## 2020-03-03 NOTE — TELEPHONE ENCOUNTER
Benazepril hctz 20-12.5 mg 1 tab daily filed 12/12/19 90 with 0 refills     LOV 8/27/19  Return in about 6 months (around 2/27/2020).   Next apt 3/31/20  Labs 7/25/19

## 2020-03-18 RX ORDER — FENOFIBRATE 160 MG/1
TABLET ORAL
Qty: 90 TABLET | Refills: 1 | Status: SHIPPED | OUTPATIENT
Start: 2020-03-18 | End: 2020-09-22

## 2020-03-18 NOTE — TELEPHONE ENCOUNTER
Last OV: 11/12/19 with Dr. Tory Henriquez  Last refill date: 12/24/19     #/refills:  #90, 0 refills  When pt was asked to return for OV: 4 weeks  Upcoming appt: 7/2/2020 with Dr. Tory Henriquez  Last labs 7/25/19

## 2020-04-21 DIAGNOSIS — I10 ESSENTIAL HYPERTENSION, BENIGN: ICD-10-CM

## 2020-04-21 RX ORDER — AMLODIPINE BESYLATE 5 MG/1
TABLET ORAL
Qty: 180 TABLET | Refills: 0 | Status: SHIPPED | OUTPATIENT
Start: 2020-04-21 | End: 2020-07-21

## 2020-04-21 NOTE — TELEPHONE ENCOUNTER
Amlodipine 5 mg  Last OV relevant to medication: 4-30-19  Last refill date: 4-25-19 #/refills: 11  When pt was asked to return for OV: 4 mo.   Upcoming appt/reason: 7-2-20  Recent labs: 7-25-19: CMP    Metoprolol 25 mg  Last OV relevant to medication: 4-30-

## 2020-06-04 RX ORDER — BENAZEPRIL HYDROCHLORIDE AND HYDROCHLOROTHIAZIDE 20; 12.5 MG/1; MG/1
TABLET ORAL
Qty: 90 TABLET | Refills: 0 | Status: SHIPPED | OUTPATIENT
Start: 2020-06-04 | End: 2020-09-02

## 2020-06-04 NOTE — TELEPHONE ENCOUNTER
Benazepril-hctz 20-12.5 mg  Last OV relevant to medication: 8-27-19  Last refill date: 3-3-20 #/refills: 0  When pt was asked to return for OV: 6 mo.   Upcoming appt/reason: 7-2-20   Recent labs: 7-25-19: CMP

## 2020-06-08 ENCOUNTER — TELEPHONE (OUTPATIENT)
Dept: INTERNAL MEDICINE CLINIC | Facility: CLINIC | Age: 80
End: 2020-06-08

## 2020-06-10 RX ORDER — ALENDRONATE SODIUM 70 MG/1
TABLET ORAL
Qty: 12 TABLET | Refills: 0 | Status: SHIPPED | OUTPATIENT
Start: 2020-06-10 | End: 2020-07-02 | Stop reason: ALTCHOICE

## 2020-06-10 NOTE — TELEPHONE ENCOUNTER
Allendronate 70mg last filled 1212/19 #12 with 1 refill    LOV 11/12/19 - back pain     Last Dexa 1/26/18

## 2020-07-02 ENCOUNTER — OFFICE VISIT (OUTPATIENT)
Dept: INTERNAL MEDICINE CLINIC | Facility: CLINIC | Age: 80
End: 2020-07-02
Payer: MEDICARE

## 2020-07-02 VITALS
HEART RATE: 68 BPM | SYSTOLIC BLOOD PRESSURE: 172 MMHG | BODY MASS INDEX: 38.34 KG/M2 | TEMPERATURE: 98 F | DIASTOLIC BLOOD PRESSURE: 80 MMHG | OXYGEN SATURATION: 95 % | RESPIRATION RATE: 18 BRPM | HEIGHT: 62.25 IN | WEIGHT: 211 LBS

## 2020-07-02 DIAGNOSIS — I10 ESSENTIAL HYPERTENSION, BENIGN: Chronic | ICD-10-CM

## 2020-07-02 DIAGNOSIS — Z78.0 ASYMPTOMATIC MENOPAUSAL STATE: ICD-10-CM

## 2020-07-02 DIAGNOSIS — Z12.31 ENCOUNTER FOR SCREENING MAMMOGRAM FOR MALIGNANT NEOPLASM OF BREAST: ICD-10-CM

## 2020-07-02 DIAGNOSIS — Z00.00 ROUTINE GENERAL MEDICAL EXAMINATION AT A HEALTH CARE FACILITY: Primary | ICD-10-CM

## 2020-07-02 PROCEDURE — G0439 PPPS, SUBSEQ VISIT: HCPCS | Performed by: INTERNAL MEDICINE

## 2020-07-02 PROCEDURE — 93000 ELECTROCARDIOGRAM COMPLETE: CPT | Performed by: INTERNAL MEDICINE

## 2020-07-02 NOTE — PATIENT INSTRUCTIONS
HTN  Monitor blood pressure twice a day and send or call office with readings.   See me after all tests

## 2020-07-02 NOTE — PROGRESS NOTES
Andi HOWARD 3/4/1940 is a 79year old female.     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 Peripheral Vascular:   General no varicosities, no claudication. Dermatologic:   Rash no. Neurologic:   Patient denies dizziness, fainting, loss of consciousness, weakness. Memory loss none. Tingling/numbness none.  Trouble with Syed Benavides  NEUROLOGICAL:   Babinski: negative/all reflexes are normal.   Cerebellar Testing grossly/intact: yes. Gait: normal.   Motor: power-normal/tone -normal/co-ordination normal/wasting -none/involuntary movements -none.    Sensory: normal sensation to

## 2020-07-03 ENCOUNTER — LAB ENCOUNTER (OUTPATIENT)
Dept: LAB | Age: 80
End: 2020-07-03
Attending: INTERNAL MEDICINE
Payer: MEDICARE

## 2020-07-03 DIAGNOSIS — Z00.00 ROUTINE GENERAL MEDICAL EXAMINATION AT A HEALTH CARE FACILITY: ICD-10-CM

## 2020-07-03 DIAGNOSIS — M54.50 ACUTE MIDLINE LOW BACK PAIN WITHOUT SCIATICA: ICD-10-CM

## 2020-07-03 LAB
ALBUMIN SERPL-MCNC: 3.9 G/DL (ref 3.4–5)
ALBUMIN/GLOB SERPL: 1.1 {RATIO} (ref 1–2)
ALP LIVER SERPL-CCNC: 32 U/L (ref 55–142)
ALT SERPL-CCNC: 25 U/L (ref 13–56)
ANION GAP SERPL CALC-SCNC: 5 MMOL/L (ref 0–18)
AST SERPL-CCNC: 22 U/L (ref 15–37)
BASOPHILS # BLD AUTO: 0.05 X10(3) UL (ref 0–0.2)
BASOPHILS NFR BLD AUTO: 0.6 %
BILIRUB SERPL-MCNC: 0.5 MG/DL (ref 0.1–2)
BILIRUB UR QL STRIP.AUTO: NEGATIVE
BUN BLD-MCNC: 31 MG/DL (ref 7–18)
BUN/CREAT SERPL: 29.5 (ref 10–20)
CALCIUM BLD-MCNC: 10 MG/DL (ref 8.5–10.1)
CHLORIDE SERPL-SCNC: 106 MMOL/L (ref 98–112)
CHOLEST SMN-MCNC: 196 MG/DL (ref ?–200)
CO2 SERPL-SCNC: 28 MMOL/L (ref 21–32)
COLOR UR AUTO: YELLOW
CREAT BLD-MCNC: 1.05 MG/DL (ref 0.55–1.02)
DEPRECATED RDW RBC AUTO: 46 FL (ref 35.1–46.3)
EOSINOPHIL # BLD AUTO: 0.27 X10(3) UL (ref 0–0.7)
EOSINOPHIL NFR BLD AUTO: 3.5 %
ERYTHROCYTE [DISTWIDTH] IN BLOOD BY AUTOMATED COUNT: 14.1 % (ref 11–15)
EST. AVERAGE GLUCOSE BLD GHB EST-MCNC: 123 MG/DL (ref 68–126)
GLOBULIN PLAS-MCNC: 3.7 G/DL (ref 2.8–4.4)
GLUCOSE BLD-MCNC: 99 MG/DL (ref 70–99)
GLUCOSE UR STRIP.AUTO-MCNC: NEGATIVE MG/DL
HBA1C MFR BLD HPLC: 5.9 % (ref ?–5.7)
HCT VFR BLD AUTO: 37.1 % (ref 35–48)
HDLC SERPL-MCNC: 36 MG/DL (ref 40–59)
HGB BLD-MCNC: 11.6 G/DL (ref 12–16)
IMM GRANULOCYTES # BLD AUTO: 0.03 X10(3) UL (ref 0–1)
IMM GRANULOCYTES NFR BLD: 0.4 %
KETONES UR STRIP.AUTO-MCNC: NEGATIVE MG/DL
LDLC SERPL CALC-MCNC: 111 MG/DL (ref ?–100)
LEUKOCYTE ESTERASE UR QL STRIP.AUTO: NEGATIVE
LYMPHOCYTES # BLD AUTO: 3.34 X10(3) UL (ref 1–4)
LYMPHOCYTES NFR BLD AUTO: 43.3 %
M PROTEIN MFR SERPL ELPH: 7.6 G/DL (ref 6.4–8.2)
MCH RBC QN AUTO: 28 PG (ref 26–34)
MCHC RBC AUTO-ENTMCNC: 31.3 G/DL (ref 31–37)
MCV RBC AUTO: 89.4 FL (ref 80–100)
MONOCYTES # BLD AUTO: 0.88 X10(3) UL (ref 0.1–1)
MONOCYTES NFR BLD AUTO: 11.4 %
NEUTROPHILS # BLD AUTO: 3.14 X10 (3) UL (ref 1.5–7.7)
NEUTROPHILS # BLD AUTO: 3.14 X10(3) UL (ref 1.5–7.7)
NEUTROPHILS NFR BLD AUTO: 40.8 %
NITRITE UR QL STRIP.AUTO: NEGATIVE
NONHDLC SERPL-MCNC: 160 MG/DL (ref ?–130)
OSMOLALITY SERPL CALC.SUM OF ELEC: 295 MOSM/KG (ref 275–295)
PATIENT FASTING Y/N/NP: YES
PATIENT FASTING Y/N/NP: YES
PH UR STRIP.AUTO: 5 [PH] (ref 4.5–8)
PLATELET # BLD AUTO: 258 10(3)UL (ref 150–450)
POTASSIUM SERPL-SCNC: 4 MMOL/L (ref 3.5–5.1)
PROT UR STRIP.AUTO-MCNC: NEGATIVE MG/DL
RBC # BLD AUTO: 4.15 X10(6)UL (ref 3.8–5.3)
RBC UR QL AUTO: NEGATIVE
SODIUM SERPL-SCNC: 139 MMOL/L (ref 136–145)
SP GR UR STRIP.AUTO: 1.02 (ref 1–1.03)
TRIGL SERPL-MCNC: 245 MG/DL (ref 30–149)
TSI SER-ACNC: 2.02 MIU/ML (ref 0.36–3.74)
UROBILINOGEN UR STRIP.AUTO-MCNC: 2 MG/DL
VLDLC SERPL CALC-MCNC: 49 MG/DL (ref 0–30)
WBC # BLD AUTO: 7.7 X10(3) UL (ref 4–11)

## 2020-07-03 PROCEDURE — 36415 COLL VENOUS BLD VENIPUNCTURE: CPT

## 2020-07-03 PROCEDURE — 83036 HEMOGLOBIN GLYCOSYLATED A1C: CPT

## 2020-07-03 PROCEDURE — 85025 COMPLETE CBC W/AUTO DIFF WBC: CPT

## 2020-07-03 PROCEDURE — 80061 LIPID PANEL: CPT

## 2020-07-03 PROCEDURE — 80053 COMPREHEN METABOLIC PANEL: CPT

## 2020-07-03 PROCEDURE — 81001 URINALYSIS AUTO W/SCOPE: CPT

## 2020-07-03 PROCEDURE — 84443 ASSAY THYROID STIM HORMONE: CPT

## 2020-07-03 NOTE — TELEPHONE ENCOUNTER
Diclofenac 50 mg  Last OV relevant to medication: 7-2-20  Last refill date: 1-15-20 #/refills: 0  When pt was asked to return for OV: 4 wks  Upcoming appt/reason: 8-3-20  Recent labs: none

## 2020-07-20 DIAGNOSIS — I10 ESSENTIAL HYPERTENSION, BENIGN: ICD-10-CM

## 2020-07-21 RX ORDER — AMLODIPINE BESYLATE 5 MG/1
TABLET ORAL
Qty: 180 TABLET | Refills: 0 | Status: SHIPPED | OUTPATIENT
Start: 2020-07-21 | End: 2020-10-21

## 2020-07-21 NOTE — TELEPHONE ENCOUNTER
Passed protocol    Medication(s) to Refill:   Requested Prescriptions     Pending Prescriptions Disp Refills   • METOPROLOL TARTRATE 25 MG Oral Tab [Pharmacy Med Name: Metoprolol Tartrate 25 MG Oral Tablet] 180 tablet 0     Sig: Take 1 tablet by mouth twic

## 2020-07-23 ENCOUNTER — HOSPITAL ENCOUNTER (OUTPATIENT)
Dept: MAMMOGRAPHY | Age: 80
Discharge: HOME | End: 2020-07-23
Attending: INTERNAL MEDICINE
Payer: MEDICARE

## 2020-07-23 ENCOUNTER — HOSPITAL ENCOUNTER (OUTPATIENT)
Dept: BONE DENSITY | Age: 80
Discharge: HOME | End: 2020-07-23
Attending: INTERNAL MEDICINE
Payer: MEDICARE

## 2020-07-23 DIAGNOSIS — Z00.00 ROUTINE GENERAL MEDICAL EXAMINATION AT A HEALTH CARE FACILITY: ICD-10-CM

## 2020-07-23 DIAGNOSIS — Z12.31 ENCOUNTER FOR SCREENING MAMMOGRAM FOR MALIGNANT NEOPLASM OF BREAST: ICD-10-CM

## 2020-07-23 DIAGNOSIS — Z78.0 ASYMPTOMATIC MENOPAUSAL STATE: ICD-10-CM

## 2020-07-23 PROCEDURE — 77063 BREAST TOMOSYNTHESIS BI: CPT | Performed by: INTERNAL MEDICINE

## 2020-07-23 PROCEDURE — 77080 DXA BONE DENSITY AXIAL: CPT | Performed by: INTERNAL MEDICINE

## 2020-07-23 PROCEDURE — 77067 SCR MAMMO BI INCL CAD: CPT | Performed by: INTERNAL MEDICINE

## 2020-08-03 ENCOUNTER — OFFICE VISIT (OUTPATIENT)
Dept: INTERNAL MEDICINE CLINIC | Facility: CLINIC | Age: 80
End: 2020-08-03
Payer: MEDICARE

## 2020-08-03 VITALS
RESPIRATION RATE: 16 BRPM | OXYGEN SATURATION: 98 % | HEART RATE: 70 BPM | SYSTOLIC BLOOD PRESSURE: 126 MMHG | DIASTOLIC BLOOD PRESSURE: 64 MMHG | HEIGHT: 62.25 IN | WEIGHT: 211 LBS | BODY MASS INDEX: 38.34 KG/M2 | TEMPERATURE: 98 F

## 2020-08-03 DIAGNOSIS — D64.9 ANEMIA, UNSPECIFIED TYPE: ICD-10-CM

## 2020-08-03 DIAGNOSIS — I10 ESSENTIAL HYPERTENSION, BENIGN: Primary | ICD-10-CM

## 2020-08-03 PROCEDURE — 99213 OFFICE O/P EST LOW 20 MIN: CPT | Performed by: INTERNAL MEDICINE

## 2020-08-03 NOTE — PROGRESS NOTES
Manda HOWARD 3/4/1940 is a 79year old female. 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 Rapid heart beat at rest no. Change in exercise tolerance no. Chest pain no.  Ches

## 2020-08-10 DIAGNOSIS — M54.50 ACUTE MIDLINE LOW BACK PAIN WITHOUT SCIATICA: ICD-10-CM

## 2020-08-10 NOTE — TELEPHONE ENCOUNTER
Requesting DICLOFENAC SODIUM 50 MG Oral Tab EC  LOV: 8/3/20  RTC: 6 months  Last Relevant Labs: 7/3/20  Filled: 7/5/20 #60 with 0 refills    Future Appointments   Date Time Provider Isiah Rodríguez   9/15/2020  9:30 AM MD Wilberto Hammond Manjinder [address] [place] [address]

## 2020-09-02 RX ORDER — BENAZEPRIL HYDROCHLORIDE AND HYDROCHLOROTHIAZIDE 20; 12.5 MG/1; MG/1
TABLET ORAL
Qty: 90 TABLET | Refills: 0 | Status: SHIPPED | OUTPATIENT
Start: 2020-09-02 | End: 2020-10-21

## 2020-09-02 NOTE — TELEPHONE ENCOUNTER
Passed protocol    Requesting BENAZEPRIL-HYDROCHLOROTHIAZIDE 20-12.5 MG Oral Tab  LOV: 8/3/20  RTC: 6 months  Last Relevant Labs: 7/3/20  Filled: 6/4/20 #90 with 0 refills    Future Appointments   Date Time Provider Isiah Rodríguez   9/15/2020  9:30 AM KEARA

## 2020-09-07 DIAGNOSIS — M54.50 ACUTE MIDLINE LOW BACK PAIN WITHOUT SCIATICA: ICD-10-CM

## 2020-09-08 NOTE — TELEPHONE ENCOUNTER
Failed protocol     Last refill:  8/10/2020 Diclofenace 50 mg #60 NR    LOV:   8/3/2020 Dr Jl Boland RTC 6 months  FOV scheduled 2/4/2021

## 2020-09-10 ENCOUNTER — TELEPHONE (OUTPATIENT)
Dept: INTERNAL MEDICINE CLINIC | Facility: CLINIC | Age: 80
End: 2020-09-10

## 2020-09-10 ENCOUNTER — LAB ENCOUNTER (OUTPATIENT)
Dept: LAB | Age: 80
End: 2020-09-10
Attending: INTERNAL MEDICINE
Payer: MEDICARE

## 2020-09-10 DIAGNOSIS — D64.9 ANEMIA, UNSPECIFIED TYPE: Primary | ICD-10-CM

## 2020-09-10 DIAGNOSIS — D64.9 ANEMIA, UNSPECIFIED TYPE: ICD-10-CM

## 2020-09-10 LAB
BASOPHILS # BLD AUTO: 0.06 X10(3) UL (ref 0–0.2)
BASOPHILS NFR BLD AUTO: 0.9 %
DEPRECATED RDW RBC AUTO: 45.7 FL (ref 35.1–46.3)
EOSINOPHIL # BLD AUTO: 0.28 X10(3) UL (ref 0–0.7)
EOSINOPHIL NFR BLD AUTO: 4.4 %
ERYTHROCYTE [DISTWIDTH] IN BLOOD BY AUTOMATED COUNT: 13.8 % (ref 11–15)
HCT VFR BLD AUTO: 38.3 % (ref 35–48)
HGB BLD-MCNC: 11.7 G/DL (ref 12–16)
IMM GRANULOCYTES # BLD AUTO: 0.04 X10(3) UL (ref 0–1)
IMM GRANULOCYTES NFR BLD: 0.6 %
LYMPHOCYTES # BLD AUTO: 2.53 X10(3) UL (ref 1–4)
LYMPHOCYTES NFR BLD AUTO: 39.5 %
MCH RBC QN AUTO: 27.5 PG (ref 26–34)
MCHC RBC AUTO-ENTMCNC: 30.5 G/DL (ref 31–37)
MCV RBC AUTO: 90.1 FL (ref 80–100)
MONOCYTES # BLD AUTO: 0.72 X10(3) UL (ref 0.1–1)
MONOCYTES NFR BLD AUTO: 11.3 %
NEUTROPHILS # BLD AUTO: 2.77 X10 (3) UL (ref 1.5–7.7)
NEUTROPHILS # BLD AUTO: 2.77 X10(3) UL (ref 1.5–7.7)
NEUTROPHILS NFR BLD AUTO: 43.3 %
PLATELET # BLD AUTO: 270 10(3)UL (ref 150–450)
RBC # BLD AUTO: 4.25 X10(6)UL (ref 3.8–5.3)
WBC # BLD AUTO: 6.4 X10(3) UL (ref 4–11)

## 2020-09-10 PROCEDURE — 85025 COMPLETE CBC W/AUTO DIFF WBC: CPT

## 2020-09-10 PROCEDURE — 36415 COLL VENOUS BLD VENIPUNCTURE: CPT

## 2020-09-10 NOTE — TELEPHONE ENCOUNTER
Jolie Sanders MD  9/10/2020  4:29 PM      Reviewed results   Please order iron studies B12 folate reticulocyte count and occult blood as well as ferritin. Hemoglobin is stable at 11.7      Pt notified of results and provider instructions.   Pt verbalize

## 2020-09-21 ENCOUNTER — LAB ENCOUNTER (OUTPATIENT)
Dept: LAB | Age: 80
End: 2020-09-21
Attending: INTERNAL MEDICINE
Payer: MEDICARE

## 2020-09-21 DIAGNOSIS — D64.9 ANEMIA, UNSPECIFIED TYPE: ICD-10-CM

## 2020-09-21 LAB
DEPRECATED HBV CORE AB SER IA-ACNC: 72.1 NG/ML
FOLATE SERPL-MCNC: 34.6 NG/ML (ref 8.7–?)
HGB RETIC QN AUTO: 33.5 PG (ref 28.2–36.6)
IMM RETICS NFR: 0.11 RATIO (ref 0.1–0.3)
IRON SATURATION: 20 %
IRON SERPL-MCNC: 106 UG/DL
RETICS # AUTO: 84.3 X10(3) UL (ref 22.5–147.5)
RETICS/RBC NFR AUTO: 2 %
TOTAL IRON BINDING CAPACITY: 541 UG/DL (ref 240–450)
TRANSFERRIN SERPL-MCNC: 363 MG/DL (ref 200–360)
VIT B12 SERPL-MCNC: 85 PG/ML (ref 193–986)

## 2020-09-21 PROCEDURE — 83540 ASSAY OF IRON: CPT

## 2020-09-21 PROCEDURE — 85045 AUTOMATED RETICULOCYTE COUNT: CPT

## 2020-09-21 PROCEDURE — 82607 VITAMIN B-12: CPT

## 2020-09-21 PROCEDURE — 36415 COLL VENOUS BLD VENIPUNCTURE: CPT

## 2020-09-21 PROCEDURE — 82746 ASSAY OF FOLIC ACID SERUM: CPT

## 2020-09-21 PROCEDURE — 83550 IRON BINDING TEST: CPT

## 2020-09-21 PROCEDURE — 82728 ASSAY OF FERRITIN: CPT

## 2020-09-22 ENCOUNTER — LAB ENCOUNTER (OUTPATIENT)
Dept: LAB | Age: 80
End: 2020-09-22
Attending: INTERNAL MEDICINE
Payer: MEDICARE

## 2020-09-22 ENCOUNTER — TELEPHONE (OUTPATIENT)
Dept: INTERNAL MEDICINE CLINIC | Facility: CLINIC | Age: 80
End: 2020-09-22

## 2020-09-22 DIAGNOSIS — R79.89 LOW VITAMIN B12 LEVEL: Primary | ICD-10-CM

## 2020-09-22 DIAGNOSIS — R19.5 OCCULT BLOOD IN STOOLS: Primary | ICD-10-CM

## 2020-09-22 PROCEDURE — 82274 ASSAY TEST FOR BLOOD FECAL: CPT

## 2020-09-22 PROCEDURE — 82272 OCCULT BLD FECES 1-3 TESTS: CPT

## 2020-09-22 RX ORDER — CYANOCOBALAMIN 1000 UG/ML
1000 INJECTION, SOLUTION INTRAMUSCULAR; SUBCUTANEOUS WEEKLY
Status: SHIPPED | OUTPATIENT
Start: 2020-10-06 | End: 2020-11-03

## 2020-09-22 RX ORDER — FENOFIBRATE 160 MG/1
TABLET ORAL
Qty: 90 TABLET | Refills: 0 | Status: SHIPPED | OUTPATIENT
Start: 2020-09-22 | End: 2020-11-12

## 2020-09-22 NOTE — TELEPHONE ENCOUNTER
Refill requested:   Requested Prescriptions     Pending Prescriptions Disp Refills   • FENOFIBRATE 160 MG Oral Tab [Pharmacy Med Name: Fenofibrate 160 MG Oral Tablet] 90 tablet 0     Sig: Take 1 tablet by mouth once daily     Last refill:  3--90 tab

## 2020-10-06 ENCOUNTER — NURSE ONLY (OUTPATIENT)
Dept: INTERNAL MEDICINE CLINIC | Facility: CLINIC | Age: 80
End: 2020-10-06
Payer: MEDICARE

## 2020-10-06 DIAGNOSIS — E53.8 VITAMIN B12 DEFICIENCY: Primary | ICD-10-CM

## 2020-10-06 PROCEDURE — 96372 THER/PROPH/DIAG INJ SC/IM: CPT | Performed by: INTERNAL MEDICINE

## 2020-10-06 RX ADMIN — CYANOCOBALAMIN 1000 MCG: 1000 INJECTION INTRAMUSCULAR; SUBCUTANEOUS at 09:03:00

## 2020-10-07 DIAGNOSIS — M54.50 ACUTE MIDLINE LOW BACK PAIN WITHOUT SCIATICA: ICD-10-CM

## 2020-10-08 NOTE — TELEPHONE ENCOUNTER
Requesting DICLOFENAC SODIUM 50 MG Oral Tab EC  LOV: 8/3/20  RTC: 6 months  Last Relevant Labs: 9/21/20  Filled: 9/8/20 #60 with 0 refills    Future Appointments   Date Time Provider Isiah Rodríguez   10/13/2020  9:30 AM [facility] NURSE [facility]
[employer]

## 2020-10-13 ENCOUNTER — NURSE ONLY (OUTPATIENT)
Dept: INTERNAL MEDICINE CLINIC | Facility: CLINIC | Age: 80
End: 2020-10-13
Payer: MEDICARE

## 2020-10-13 PROCEDURE — 96372 THER/PROPH/DIAG INJ SC/IM: CPT | Performed by: INTERNAL MEDICINE

## 2020-10-13 RX ADMIN — CYANOCOBALAMIN 1000 MCG: 1000 INJECTION INTRAMUSCULAR; SUBCUTANEOUS at 09:24:00

## 2020-10-20 ENCOUNTER — NURSE ONLY (OUTPATIENT)
Dept: INTERNAL MEDICINE CLINIC | Facility: CLINIC | Age: 80
End: 2020-10-20
Payer: MEDICARE

## 2020-10-20 DIAGNOSIS — I10 ESSENTIAL HYPERTENSION, BENIGN: ICD-10-CM

## 2020-10-20 DIAGNOSIS — E53.8 VITAMIN B12 DEFICIENCY: Primary | ICD-10-CM

## 2020-10-20 PROCEDURE — 96372 THER/PROPH/DIAG INJ SC/IM: CPT | Performed by: INTERNAL MEDICINE

## 2020-10-20 RX ADMIN — CYANOCOBALAMIN 1000 MCG: 1000 INJECTION INTRAMUSCULAR; SUBCUTANEOUS at 08:58:00

## 2020-10-21 RX ORDER — AMLODIPINE BESYLATE 5 MG/1
TABLET ORAL
Qty: 180 TABLET | Refills: 0 | Status: SHIPPED | OUTPATIENT
Start: 2020-10-21 | End: 2021-01-04

## 2020-10-21 NOTE — TELEPHONE ENCOUNTER
LOV: 8/3/2020 with Dr. Doreen De Leon  RTC: 6 months  Last Relevant Labs: September 2020  Filled: 9/2/2020  #90 with 0 refills    Future Appointments   Date Time Provider Isiah Rodríguez   10/27/2020  9:00 AM [facility] N[facility]SE [facility]   11/17/2020  9:00 A

## 2020-10-21 NOTE — TELEPHONE ENCOUNTER
Amlodipine 5 mg 1 tab bid filled 7-21-20 180 with 0 refills   Metoprolol 25 mg 1 tab bid filled 7-21-20 180 with 0 refills     LOV 8-3-20   Return in about 6 months (around 2/3/2021). Greta Iqbal   Next apt 2-4-21  Labs 7-3-20

## 2020-10-22 RX ORDER — BENAZEPRIL HYDROCHLORIDE AND HYDROCHLOROTHIAZIDE 20; 12.5 MG/1; MG/1
TABLET ORAL
Qty: 90 TABLET | Refills: 0 | Status: SHIPPED | OUTPATIENT
Start: 2020-10-22 | End: 2021-01-04

## 2020-11-10 ENCOUNTER — HOSPITAL ENCOUNTER (OUTPATIENT)
Age: 80
Discharge: LEFT BEFORE DISCHARGE | End: 2020-11-10
Attending: EMERGENCY MEDICINE
Payer: MEDICARE

## 2020-11-10 ENCOUNTER — APPOINTMENT (OUTPATIENT)
Dept: GENERAL RADIOLOGY | Age: 80
End: 2020-11-10
Attending: EMERGENCY MEDICINE
Payer: MEDICARE

## 2020-11-10 VITALS
SYSTOLIC BLOOD PRESSURE: 187 MMHG | OXYGEN SATURATION: 97 % | HEART RATE: 62 BPM | DIASTOLIC BLOOD PRESSURE: 78 MMHG | RESPIRATION RATE: 16 BRPM | TEMPERATURE: 98 F

## 2020-11-10 DIAGNOSIS — S30.0XXA CONTUSION OF PELVIS, INITIAL ENCOUNTER: Primary | ICD-10-CM

## 2020-11-10 DIAGNOSIS — Z53.29 LEFT AGAINST MEDICAL ADVICE: ICD-10-CM

## 2020-11-10 DIAGNOSIS — M54.50 ACUTE MIDLINE LOW BACK PAIN WITHOUT SCIATICA: ICD-10-CM

## 2020-11-10 PROCEDURE — 72220 X-RAY EXAM SACRUM TAILBONE: CPT | Performed by: EMERGENCY MEDICINE

## 2020-11-10 PROCEDURE — 99214 OFFICE O/P EST MOD 30 MIN: CPT

## 2020-11-10 PROCEDURE — 72190 X-RAY EXAM OF PELVIS: CPT | Performed by: EMERGENCY MEDICINE

## 2020-11-10 PROCEDURE — 99203 OFFICE O/P NEW LOW 30 MIN: CPT

## 2020-11-10 NOTE — ED INITIAL ASSESSMENT (HPI)
Patient reports fell 2.5 weeks ago landed backwards to the ground. Denies loss of consciousness. C/o both hip pain and buttocks and unable to sleep. Taking Aleve with some relief.

## 2020-11-10 NOTE — ED PROVIDER NOTES
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 She states that her  is home alone and has Alzheimer's she is afraid he is going to get her or leave the stove burner on, she does not feel that she can leave him any longer.   She states that she

## 2020-11-11 ENCOUNTER — HOSPITAL ENCOUNTER (OUTPATIENT)
Dept: CT IMAGING | Age: 80
Discharge: HOME | End: 2020-11-11
Attending: EMERGENCY MEDICINE
Payer: MEDICARE

## 2020-11-11 PROCEDURE — 73700 CT LOWER EXTREMITY W/O DYE: CPT | Performed by: EMERGENCY MEDICINE

## 2020-11-11 PROCEDURE — 76377 3D RENDER W/INTRP POSTPROCES: CPT | Performed by: EMERGENCY MEDICINE

## 2020-11-12 ENCOUNTER — OFFICE VISIT (OUTPATIENT)
Dept: INTERNAL MEDICINE CLINIC | Facility: CLINIC | Age: 80
End: 2020-11-12
Payer: MEDICARE

## 2020-11-12 VITALS
HEART RATE: 88 BPM | RESPIRATION RATE: 18 BRPM | TEMPERATURE: 99 F | WEIGHT: 204.63 LBS | DIASTOLIC BLOOD PRESSURE: 90 MMHG | BODY MASS INDEX: 37.18 KG/M2 | HEIGHT: 62.25 IN | SYSTOLIC BLOOD PRESSURE: 156 MMHG | OXYGEN SATURATION: 99 %

## 2020-11-12 DIAGNOSIS — M25.552 LEFT HIP PAIN: Primary | ICD-10-CM

## 2020-11-12 PROCEDURE — 99213 OFFICE O/P EST LOW 20 MIN: CPT | Performed by: INTERNAL MEDICINE

## 2020-11-12 RX ORDER — FENOFIBRATE 160 MG/1
160 TABLET ORAL DAILY
Qty: 90 TABLET | Refills: 1 | Status: SHIPPED | OUTPATIENT
Start: 2020-11-12 | End: 2021-05-21

## 2020-11-12 RX ORDER — HYDROCODONE BITARTRATE AND ACETAMINOPHEN 5; 325 MG/1; MG/1
1 TABLET ORAL EVERY 6 HOURS PRN
Qty: 25 TABLET | Refills: 0 | Status: SHIPPED | OUTPATIENT
Start: 2020-11-12 | End: 2020-11-27 | Stop reason: ALTCHOICE

## 2020-11-12 NOTE — ED NOTES
Pt here, print out of CT result given to pt. Discussed findings and advised follow up with PCP for further care. Pt agreeable, has no further questions.

## 2020-11-12 NOTE — TELEPHONE ENCOUNTER
Requesting DICLOFENAC SODIUM 50 MG Oral Tab EC  LOV: 8/3/20  RTC: 6 months  Last Relevant Labs: 7/3/20  Filled: 10/8/20 #60 with 0 refills    Future Appointments   Date Time Provider Isiah Rodríguez   11/17/2020  9:00 AM Orion Jackson MD [facility]

## 2020-11-12 NOTE — PROGRESS NOTES
Iesha Velez  3/4/1940 is a 79year old female. Patient presents with:  Test Results      HPI:   C/o pain in hip left. This is been going for about 2 weeks.   Patient was working under the porch apparently fell backwards this about 2 weeks ago hurt nicolasa essential hypertension    • Urethral stricture due to infection    • Urinary incontinence    • Weakness of right hand 4/18/2018      Social History:  Social History    Tobacco Use      Smoking status: Never Smoker      Smokeless tobacco: Never Used    [place]

## 2020-11-16 ENCOUNTER — OFFICE VISIT (OUTPATIENT)
Dept: ORTHOPEDICS CLINIC | Facility: CLINIC | Age: 80
End: 2020-11-16
Payer: MEDICARE

## 2020-11-16 VITALS — OXYGEN SATURATION: 99 % | HEART RATE: 59 BPM

## 2020-11-16 DIAGNOSIS — R10.2 PAIN IN PELVIS: ICD-10-CM

## 2020-11-16 DIAGNOSIS — S30.0XXA CONTUSION OF BUTTOCK, INITIAL ENCOUNTER: Primary | ICD-10-CM

## 2020-11-16 PROCEDURE — 99203 OFFICE O/P NEW LOW 30 MIN: CPT | Performed by: ORTHOPAEDIC SURGERY

## 2020-11-16 NOTE — H&P
[facility] New Patient Note    CC: Patient presents with:  Hip Pain: bilateral hip pain       HPI: This 79year old female presents today with complaints of pain in her hip.   When asked where this pain occurs, patient points to bilateral buttock heath SURGERY  11/3/2009    RIGHT   • KNEE REPLACEMENT SURGERY  11/11/2008    LEFT   • TONSILLECTOMY     • WRIST CARPAL TUNNEL RELEASE Right 8/25/2015    Performed by Merlinda Lake, MD at [facility] MAIN OR     Current Outpatient Medications   Medication Sig Dispense affect. Respiratory: Unlabored breathing. Gait: Ambulates slightly stooped/flexed forward at the lumbar spine  Stance: Level pelvis  Back: Nontender about lumbar spine. Patient does have tenderness about the left sacroiliac joint.   There is tenderness t She does not have any sort of fracture identified through her hip joint, and based on examination she has no pain with manipulation of the hip. I do not feel that the ball-and-socket hip joint is contributing to her symptoms.   Given the posterior pain kassandra

## 2020-11-27 ENCOUNTER — OFFICE VISIT (OUTPATIENT)
Dept: INTERNAL MEDICINE CLINIC | Facility: CLINIC | Age: 80
End: 2020-11-27
Payer: MEDICARE

## 2020-11-27 VITALS
TEMPERATURE: 99 F | OXYGEN SATURATION: 98 % | HEART RATE: 64 BPM | RESPIRATION RATE: 18 BRPM | DIASTOLIC BLOOD PRESSURE: 86 MMHG | SYSTOLIC BLOOD PRESSURE: 154 MMHG | BODY MASS INDEX: 37 KG/M2 | WEIGHT: 204 LBS

## 2020-11-27 DIAGNOSIS — I10 ESSENTIAL HYPERTENSION, BENIGN: Primary | Chronic | ICD-10-CM

## 2020-11-27 PROCEDURE — 99213 OFFICE O/P EST LOW 20 MIN: CPT | Performed by: INTERNAL MEDICINE

## 2020-11-27 RX ORDER — METOPROLOL TARTRATE 50 MG/1
50 TABLET, FILM COATED ORAL 2 TIMES DAILY
Qty: 180 TABLET | Refills: 2 | Status: SHIPPED | OUTPATIENT
Start: 2020-11-27 | End: 2021-01-04

## 2020-11-27 NOTE — PROGRESS NOTES
Brittany Ledesma  3/4/1940 is a 79year old female. Patient presents with:   Follow - Up       HPI:     Current Outpatient Medications   Medication Sig Dispense Refill   • metoprolol Tartrate 50 MG Oral Tab Take 1 tablet (50 mg total) by mouth 2 (two) susanne REVIEW OF SYSTEMS:   Cardiovascular:   Syncope none. Rapid heart beat at rest no. Change in exercise tolerance no. Chest pain no. Chest pain while awake none. Cold extremities no. Dizziness no. Dyspnea on exertion none. Fainting none. Fatigue no.  High

## 2020-12-01 ENCOUNTER — MED REC SCAN ONLY (OUTPATIENT)
Dept: ORTHOPEDICS CLINIC | Facility: CLINIC | Age: 80
End: 2020-12-01

## 2020-12-06 DIAGNOSIS — M54.50 ACUTE MIDLINE LOW BACK PAIN WITHOUT SCIATICA: ICD-10-CM

## 2020-12-08 NOTE — TELEPHONE ENCOUNTER
Requesting DICLOFENAC SODIUM 50 MG Oral Tab EC  LOV: 11/27/20  RTC: 4 weeks  Last Relevant Labs: 9/21/20  Filled: 11/12/20 #60 with 0 refills    Future Appointments   Date Time Provider Isiah Rodríguez   12/29/2020  9:15 AM Jeffrey Morse MD [facility]

## 2021-01-01 ENCOUNTER — MED REC SCAN ONLY (OUTPATIENT)
Dept: ORTHOPEDICS CLINIC | Facility: CLINIC | Age: 81
End: 2021-01-01

## 2021-01-04 ENCOUNTER — OFFICE VISIT (OUTPATIENT)
Dept: INTERNAL MEDICINE CLINIC | Facility: CLINIC | Age: 81
End: 2021-01-04
Payer: MEDICARE

## 2021-01-04 VITALS
BODY MASS INDEX: 36.52 KG/M2 | OXYGEN SATURATION: 98 % | TEMPERATURE: 98 F | RESPIRATION RATE: 18 BRPM | DIASTOLIC BLOOD PRESSURE: 78 MMHG | WEIGHT: 201 LBS | HEIGHT: 62.25 IN | SYSTOLIC BLOOD PRESSURE: 178 MMHG | HEART RATE: 65 BPM

## 2021-01-04 DIAGNOSIS — M25.552 LEFT HIP PAIN: ICD-10-CM

## 2021-01-04 DIAGNOSIS — M54.50 ACUTE MIDLINE LOW BACK PAIN WITHOUT SCIATICA: Primary | ICD-10-CM

## 2021-01-04 DIAGNOSIS — I10 ESSENTIAL HYPERTENSION, BENIGN: ICD-10-CM

## 2021-01-04 PROCEDURE — 99214 OFFICE O/P EST MOD 30 MIN: CPT | Performed by: INTERNAL MEDICINE

## 2021-01-04 RX ORDER — AMLODIPINE BESYLATE 5 MG/1
5 TABLET ORAL 2 TIMES DAILY
Qty: 180 TABLET | Refills: 0 | Status: SHIPPED | OUTPATIENT
Start: 2021-01-04 | End: 2021-04-19

## 2021-01-04 RX ORDER — BENAZEPRIL HYDROCHLORIDE AND HYDROCHLOROTHIAZIDE 20; 12.5 MG/1; MG/1
1 TABLET ORAL DAILY
Qty: 90 TABLET | Refills: 0 | Status: SHIPPED | OUTPATIENT
Start: 2021-01-04 | End: 2021-03-03

## 2021-01-04 RX ORDER — METOPROLOL TARTRATE 50 MG/1
75 TABLET, FILM COATED ORAL 2 TIMES DAILY
Qty: 180 TABLET | Refills: 2 | COMMUNITY
Start: 2021-01-04 | End: 2021-07-12

## 2021-01-04 NOTE — PROGRESS NOTES
Nora Clark  3/4/1940 is a 79year old female. Patient presents with:  Pain      HPI:   C/o of low back pain see previous ER note.   Patient also did see the orthopedic physician was sent for physical therapy however the patient failed to have much r cement, left 1/13/2016   • Senile osteoporosis    • Unspecified essential hypertension    • Urethral stricture due to infection    • Urinary incontinence    • Weakness of right hand 4/18/2018      Social History:  Social History    Tobacco Use      Smoking extremity, pinprick sensation is normal in the lower extremities. REFLEXES: bilaterally symmetrical, babinski negative. GAIT: normal.   ABDOMEN:   Bowel sounds: normal.   General: normal.   Guarding: absent. Hernia: absent.    Kidneys: normal.   Liver

## 2021-01-11 DIAGNOSIS — M54.50 ACUTE MIDLINE LOW BACK PAIN WITHOUT SCIATICA: ICD-10-CM

## 2021-01-11 NOTE — TELEPHONE ENCOUNTER
Medication(s) to Refill:   Requested Prescriptions     Pending Prescriptions Disp Refills   • DICLOFENAC SODIUM 50 MG Oral Tab EC [Pharmacy Med Name: Diclofenac Sodium 50 MG Oral Tablet Delayed Release] 60 tablet 0     Sig: Take 1 tablet by mouth twice brandy

## 2021-01-15 ENCOUNTER — OFFICE VISIT (OUTPATIENT)
Dept: PHYSICAL MEDICINE AND REHAB | Facility: CLINIC | Age: 81
End: 2021-01-15
Payer: MEDICARE

## 2021-01-15 VITALS
WEIGHT: 201 LBS | HEART RATE: 58 BPM | RESPIRATION RATE: 16 BRPM | OXYGEN SATURATION: 99 % | HEIGHT: 63 IN | DIASTOLIC BLOOD PRESSURE: 74 MMHG | SYSTOLIC BLOOD PRESSURE: 158 MMHG | BODY MASS INDEX: 35.61 KG/M2

## 2021-01-15 DIAGNOSIS — G89.29 CHRONIC BUTTOCK PAIN: Primary | ICD-10-CM

## 2021-01-15 DIAGNOSIS — M79.18 CHRONIC BUTTOCK PAIN: Primary | ICD-10-CM

## 2021-01-15 PROCEDURE — 99204 OFFICE O/P NEW MOD 45 MIN: CPT | Performed by: PHYSICAL MEDICINE & REHABILITATION

## 2021-01-15 NOTE — H&P
History and Physical    C/C: Bilateral buttock pain    HPI: This is a 28-year-old female with past medical history of hypertension, diabetes who presents with bilateral buttock pain.   The symptoms began in September 2020 after she accidentally struck her h negative    Imaging:  XR sacrum/coccyx 11/10/2020:  FINDINGS:    BONES:  No acute displaced fracture appreciated. Lucency involves the left femoral neck concerning for a nondisplaced fracture. Degenerative change involves lower lumbar sacral spine.     XR discuss results. Dictation completed using dragon speech technology; excuse any typos. 45 minutes spent precharting, conducting history and physical, reviewing images, discussing treatment options, completing documentation.     Rakan Vega

## 2021-01-15 NOTE — PROGRESS NOTES
Location of Pain: both buttocks    Date Pain Began: September 2020          Work Related:   No        Receiving Work Comp/Disability:   No    Numeric Rating Scale:  Pain at Present:  7

## 2021-01-15 NOTE — PATIENT INSTRUCTIONS
Start the medrol dosepak tomorrow morning. Take the medication with food. Stop the diclofenac while you are taking the medrol dosepak.

## 2021-01-18 ENCOUNTER — TELEPHONE (OUTPATIENT)
Dept: SURGERY | Facility: CLINIC | Age: 81
End: 2021-01-18

## 2021-01-18 RX ORDER — METHYLPREDNISOLONE 4 MG/1
TABLET ORAL
Qty: 1 PACKAGE | Refills: 0 | Status: SHIPPED | OUTPATIENT
Start: 2021-01-18 | End: 2021-02-26 | Stop reason: ALTCHOICE

## 2021-01-18 NOTE — TELEPHONE ENCOUNTER
Medrol dosepak prescribed to pharmacy. Notes reviewed; she has an unknown allergic reaction to cortisone listed, but she had a medrol dosepak in 2019 for rash associated with poison ivy with no mention of side effects afterwards.

## 2021-01-18 NOTE — TELEPHONE ENCOUNTER
Patient informed of recommendations below. Understanding verbalized. Patient stated she will complete the dose matthew and MRI Lumbar Spine and will follow up with Dr Selena Renae in office. No further questions or needs.

## 2021-01-18 NOTE — TELEPHONE ENCOUNTER
Ajit Ricketts, DO  You 1 minute ago (10:38 AM)        Medrol dosepak prescribed to pharmacy.  Notes reviewed; she has an unknown allergic reaction to cortisone listed, but she had a medrol dosepak in 2019 for rash associated with poison ivy with no mention

## 2021-01-18 NOTE — TELEPHONE ENCOUNTER
Noted patient was seen in office 1/15/2021      Per Dr Brenda Zuleta notes   Assessment and plan:  1) bilateral buttock pain.   Differential includes lumbar stenosis with neurogenic claudication, bilateral sacroiliac joint pain     Due to persistent symptoms de

## 2021-02-09 ENCOUNTER — HOSPITAL ENCOUNTER (OUTPATIENT)
Dept: MRI IMAGING | Age: 81
Discharge: HOME | End: 2021-02-09
Attending: PHYSICAL MEDICINE & REHABILITATION
Payer: MEDICARE

## 2021-02-09 DIAGNOSIS — M79.18 CHRONIC BUTTOCK PAIN: ICD-10-CM

## 2021-02-09 DIAGNOSIS — G89.29 CHRONIC BUTTOCK PAIN: ICD-10-CM

## 2021-02-09 PROCEDURE — 72148 MRI LUMBAR SPINE W/O DYE: CPT | Performed by: PHYSICAL MEDICINE & REHABILITATION

## 2021-02-10 ENCOUNTER — TELEPHONE (OUTPATIENT)
Dept: PAIN CLINIC | Facility: CLINIC | Age: 81
End: 2021-02-10

## 2021-02-10 NOTE — TELEPHONE ENCOUNTER
----- Message from Nolene Saint, DO sent at 2/9/2021  8:23 PM CST -----  Please let the patient know she has a lot of stenosis or narrowing at several levels of the lower back.  There is also a mild compression fracture of one of the bones of the lower fredo

## 2021-02-11 NOTE — TELEPHONE ENCOUNTER
LMTCB     When returns call please help patient schedule a follow up visit with Dr Anna Bryant in office to discuss test results and recommendations.

## 2021-02-21 DIAGNOSIS — M54.50 ACUTE MIDLINE LOW BACK PAIN WITHOUT SCIATICA: ICD-10-CM

## 2021-02-22 NOTE — TELEPHONE ENCOUNTER
Spoke to pt to relay info below and offer to schedule a f/u visit. Pt agreeable. Placed on schedule on 2/26/2021. Pt states she attempted to call office 3-4 times and was repeatedly told no such doctor worked here. Provided office phone number.  No further

## 2021-02-26 ENCOUNTER — OFFICE VISIT (OUTPATIENT)
Dept: PHYSICAL MEDICINE AND REHAB | Facility: CLINIC | Age: 81
End: 2021-02-26
Payer: MEDICARE

## 2021-02-26 VITALS
OXYGEN SATURATION: 97 % | SYSTOLIC BLOOD PRESSURE: 152 MMHG | RESPIRATION RATE: 16 BRPM | HEIGHT: 63 IN | HEART RATE: 52 BPM | WEIGHT: 203 LBS | DIASTOLIC BLOOD PRESSURE: 80 MMHG | BODY MASS INDEX: 35.97 KG/M2

## 2021-02-26 DIAGNOSIS — M48.062 LUMBAR STENOSIS WITH NEUROGENIC CLAUDICATION: Primary | ICD-10-CM

## 2021-02-26 DIAGNOSIS — M51.26 HERNIATED NUCLEUS PULPOSUS, L2-3 RIGHT: ICD-10-CM

## 2021-02-26 PROCEDURE — 99214 OFFICE O/P EST MOD 30 MIN: CPT | Performed by: PHYSICAL MEDICINE & REHABILITATION

## 2021-02-26 RX ORDER — GABAPENTIN 100 MG/1
CAPSULE ORAL
Qty: 120 CAPSULE | Refills: 1 | Status: SHIPPED | OUTPATIENT
Start: 2021-02-26 | End: 2021-04-23

## 2021-02-26 NOTE — PROGRESS NOTES
Patient presents in office today with reported pain in both buttocks    Current pain level reported =7/10      Last reported dose of DICLOFENAC SODIUM 50 MG Oral Tab EC, 3 weeks ago prior to OV,

## 2021-02-26 NOTE — PROGRESS NOTES
Progress note    C/C: low back and right hip pain    HPI: 29-year-old female presents for follow-up. Underwent MRI of the lumbar spine.   Continues to have right-sided lower back pain with radiation to the right anterior thigh stopping above the knee, with precharting, conducting H&P, reviewing images, discussing treatment options, completing documentation.     Amarilys Velazco DO  Physical Medicine and [address] [place] [address]

## 2021-03-03 ENCOUNTER — TELEPHONE (OUTPATIENT)
Dept: NEUROLOGY | Facility: CLINIC | Age: 81
End: 2021-03-03

## 2021-03-03 RX ORDER — BENAZEPRIL HYDROCHLORIDE AND HYDROCHLOROTHIAZIDE 20; 12.5 MG/1; MG/1
1 TABLET ORAL DAILY
Qty: 90 TABLET | Refills: 0 | Status: SHIPPED | OUTPATIENT
Start: 2021-03-03 | End: 2021-05-21

## 2021-03-03 NOTE — TELEPHONE ENCOUNTER
Patient saw Dr. Stacey Islas in [place] 2/26/2021. Patient indicates that Dr. Stacey Islas was going to order her a prescription. She doesn't remember name of it but when I searched chart a script for gabapentin was sent to pharmacy.   Pharmacy indicates they didn

## 2021-03-03 NOTE — TELEPHONE ENCOUNTER
[address] to verify that script had been rcv'd. Spoke to Kendrick who states gabapentin has been ready for  for 6 days. Contacted pt to advise that gabapentin is ready for p/u. Pt WIN.

## 2021-03-08 DIAGNOSIS — Z23 NEED FOR VACCINATION: ICD-10-CM

## 2021-03-19 DIAGNOSIS — M54.50 ACUTE MIDLINE LOW BACK PAIN WITHOUT SCIATICA: ICD-10-CM

## 2021-03-22 NOTE — TELEPHONE ENCOUNTER
No protocol   Medication(s) to Refill:   Requested Prescriptions     Pending Prescriptions Disp Refills   • DICLOFENAC SODIUM 50 MG Oral Tab EC [Pharmacy Med Name: Diclofenac Sodium 50 MG Oral Tablet Delayed Release] 60 tablet 0     Sig: Take 1 tablet by m

## 2021-03-25 ENCOUNTER — IMMUNIZATION (OUTPATIENT)
Dept: LAB | Age: 81
End: 2021-03-25
Attending: HOSPITALIST
Payer: MEDICARE

## 2021-03-25 DIAGNOSIS — Z23 NEED FOR VACCINATION: Primary | ICD-10-CM

## 2021-03-25 PROCEDURE — 0001A SARSCOV2 VAC 30MCG/0.3ML IM: CPT

## 2021-04-12 ENCOUNTER — OFFICE VISIT (OUTPATIENT)
Dept: INTERNAL MEDICINE CLINIC | Facility: CLINIC | Age: 81
End: 2021-04-12
Payer: MEDICARE

## 2021-04-12 VITALS
BODY MASS INDEX: 35.61 KG/M2 | HEART RATE: 66 BPM | RESPIRATION RATE: 16 BRPM | TEMPERATURE: 98 F | DIASTOLIC BLOOD PRESSURE: 84 MMHG | WEIGHT: 201 LBS | SYSTOLIC BLOOD PRESSURE: 172 MMHG | OXYGEN SATURATION: 97 % | HEIGHT: 63 IN

## 2021-04-12 DIAGNOSIS — I10 ESSENTIAL HYPERTENSION, BENIGN: ICD-10-CM

## 2021-04-12 DIAGNOSIS — B02.9 HERPES ZOSTER WITHOUT COMPLICATION: Primary | ICD-10-CM

## 2021-04-12 PROCEDURE — 99213 OFFICE O/P EST LOW 20 MIN: CPT | Performed by: INTERNAL MEDICINE

## 2021-04-12 RX ORDER — FAMCICLOVIR 500 MG/1
500 TABLET, FILM COATED ORAL 3 TIMES DAILY
Qty: 21 TABLET | Refills: 0 | Status: SHIPPED | OUTPATIENT
Start: 2021-04-12 | End: 2021-04-19

## 2021-04-12 NOTE — PROGRESS NOTES
Author Flow LEE 3/4/1940 is a [de-identified]year old female. Patient presents with:  Rash      HPI:   Rash left abdomen for last 3 days.   Patient unable to get in the office- communication difficulty  Current Outpatient Medications   Medication Sig Dispense Refil and vomiting)     slow to awaken   • S/P total knee replacement using cement, left 1/13/2016   • Senile osteoporosis    • Unspecified essential hypertension    • Urethral stricture due to infection    • Urinary incontinence    • Weakness of right hand 4/18 contagious period ends when all blisters have crusted over, generally 1 to 2 weeks after the illness starts. After the blisters heal, the affected skin may be sensitive or painful for weeks or months, gradually resolving over time.  But, sometimes this can redness, fever, or increasing pain  Sherley last reviewed this educational content on 4/1/2018  © 9088-2874 [employer]. All rights reserved. This information is not intended as a substitute for professional medical care.  Always follow your

## 2021-04-15 ENCOUNTER — IMMUNIZATION (OUTPATIENT)
Dept: LAB | Age: 81
End: 2021-04-15
Attending: HOSPITALIST
Payer: MEDICARE

## 2021-04-15 DIAGNOSIS — Z23 NEED FOR VACCINATION: Primary | ICD-10-CM

## 2021-04-15 PROCEDURE — 0002A SARSCOV2 VAC 30MCG/0.3ML IM: CPT

## 2021-04-17 DIAGNOSIS — I10 ESSENTIAL HYPERTENSION, BENIGN: ICD-10-CM

## 2021-04-19 RX ORDER — AMLODIPINE BESYLATE 5 MG/1
TABLET ORAL
Qty: 180 TABLET | Refills: 0 | Status: SHIPPED | OUTPATIENT
Start: 2021-04-19 | End: 2021-07-23

## 2021-04-19 NOTE — TELEPHONE ENCOUNTER
Passed protocol    Requesting amLODIPine Besylate 5 MG Oral Tab  LOV: 4/12/21  RTC: 1 week  Last Relevant Labs: 7/3/20  Filled: 1/4/21 #180 with 0 refills    Future Appointments   Date Time Provider Isiah Rodríguez   4/20/2021 10:00 AM Hiro Cruz MD

## 2021-04-21 ENCOUNTER — OFFICE VISIT (OUTPATIENT)
Dept: INTERNAL MEDICINE CLINIC | Facility: CLINIC | Age: 81
End: 2021-04-21
Payer: MEDICARE

## 2021-04-21 VITALS
SYSTOLIC BLOOD PRESSURE: 164 MMHG | TEMPERATURE: 98 F | RESPIRATION RATE: 16 BRPM | HEART RATE: 56 BPM | OXYGEN SATURATION: 99 % | BODY MASS INDEX: 35.48 KG/M2 | DIASTOLIC BLOOD PRESSURE: 82 MMHG | HEIGHT: 63 IN | WEIGHT: 200.25 LBS

## 2021-04-21 DIAGNOSIS — B02.9 HERPES ZOSTER WITHOUT COMPLICATION: Primary | ICD-10-CM

## 2021-04-21 DIAGNOSIS — I10 ESSENTIAL HYPERTENSION, BENIGN: ICD-10-CM

## 2021-04-21 PROCEDURE — 99213 OFFICE O/P EST LOW 20 MIN: CPT | Performed by: NURSE PRACTITIONER

## 2021-04-21 NOTE — PROGRESS NOTES
CHIEF COMPLAINT:     Patient presents with: Follow - Up: shingles       HPI:   Cori Greenwood is a [de-identified]year old female taylor for follow up on shingles. Was diagnosed on 4/12/21 and didn't complete course of famciclovir as it was keeping her up at night.  Pt st Cataract 9/9/2014   • Left hip pain 2/27/2013   • Osteoarthritis of left shoulder, unspecified osteoarthritis type 1/13/2016   • Osteoarthrosis, unspecified whether generalized or localized, unspecified site    • Other and unspecified hyperlipidemia    • P

## 2021-04-22 DIAGNOSIS — M54.50 ACUTE MIDLINE LOW BACK PAIN WITHOUT SCIATICA: ICD-10-CM

## 2021-04-23 ENCOUNTER — TELEPHONE (OUTPATIENT)
Dept: PAIN CLINIC | Facility: CLINIC | Age: 81
End: 2021-04-23

## 2021-04-23 ENCOUNTER — OFFICE VISIT (OUTPATIENT)
Dept: PHYSICAL MEDICINE AND REHAB | Facility: CLINIC | Age: 81
End: 2021-04-23
Payer: MEDICARE

## 2021-04-23 VITALS
BODY MASS INDEX: 35 KG/M2 | HEART RATE: 68 BPM | DIASTOLIC BLOOD PRESSURE: 80 MMHG | OXYGEN SATURATION: 96 % | SYSTOLIC BLOOD PRESSURE: 158 MMHG | WEIGHT: 200 LBS

## 2021-04-23 DIAGNOSIS — M48.062 LUMBAR STENOSIS WITH NEUROGENIC CLAUDICATION: Primary | ICD-10-CM

## 2021-04-23 DIAGNOSIS — M51.26 HERNIATED NUCLEUS PULPOSUS, L2-3 RIGHT: ICD-10-CM

## 2021-04-23 PROCEDURE — 99214 OFFICE O/P EST MOD 30 MIN: CPT | Performed by: PHYSICAL MEDICINE & REHABILITATION

## 2021-04-23 RX ORDER — GABAPENTIN 300 MG/1
300 CAPSULE ORAL 2 TIMES DAILY
Qty: 60 CAPSULE | Refills: 0 | Status: SHIPPED | OUTPATIENT
Start: 2021-04-23 | End: 2021-06-18

## 2021-04-23 NOTE — PROGRESS NOTES
Patient presents in office today with reported pain in right buttocks    Current pain level reported = 9/10    Last reported dose of diclofenac this morning

## 2021-04-23 NOTE — TELEPHONE ENCOUNTER
Assessment and plan  #1 lumbar stenosis  2.   Right L2-L3 herniated nucleus pulposus     Due to persistent, worsening symptoms despite gabapentin, physical therapy recommend right L2 and L3 transforaminal epidural steroid injection under fluoroscopy, local.

## 2021-04-23 NOTE — PATIENT INSTRUCTIONS
This is a video on epidural steroid injections for spine pain made by the [employer], a multidisciplinary organization dedicated to the treatment of spine conditions.  The link is as follows:     [email or web address]

## 2021-04-23 NOTE — TELEPHONE ENCOUNTER
Question Answer Comment   Anesthesia Type Local    Provider [facility],[address] for Surgery   Procedure Transforaminal RIGHT L2 AND L3 TRANSFORAMINAL EPIDURAL STEROID INJECTION UNDER FLUOROSCOPY UNDER LOCAL ANESTHESIA.    Laterality/Level RIGHT

## 2021-04-23 NOTE — PROGRESS NOTES
Progress note    C/C: right buttock and thigh pain    HPI: 29-year-old female presents for follow-up. Worsening right buttock pain radiating down the posterior thigh. Greatly limiting her ability to stand, walk, forward bend.   It takes her quite a while

## 2021-04-29 NOTE — TELEPHONE ENCOUNTER
Spoke with pts daughter Parmjit Doss, requesting to schedule pt for injection. Parmjit Doss stated pts shingles have completely scabbed over and there have been no new pustules in a while. Patient scheduled for procedure, pre-procedure instructions reviewed.  Advised pro

## 2021-04-29 NOTE — TELEPHONE ENCOUNTER
COVID test ordered    CFS scheduling form completed and faxed to [phone]. Confirmation received.

## 2021-05-11 ENCOUNTER — LAB ENCOUNTER (OUTPATIENT)
Dept: LAB | Age: 81
End: 2021-05-11
Attending: PHYSICAL MEDICINE & REHABILITATION
Payer: MEDICARE

## 2021-05-11 DIAGNOSIS — M48.062 LUMBAR STENOSIS WITH NEUROGENIC CLAUDICATION: ICD-10-CM

## 2021-05-14 ENCOUNTER — OFFICE VISIT (OUTPATIENT)
Dept: SURGERY | Facility: CLINIC | Age: 81
End: 2021-05-14

## 2021-05-14 DIAGNOSIS — M51.26 HERNIATED NUCLEUS PULPOSUS, L2-3 RIGHT: ICD-10-CM

## 2021-05-14 DIAGNOSIS — M48.061 LUMBAR STENOSIS WITHOUT NEUROGENIC CLAUDICATION: Primary | ICD-10-CM

## 2021-05-14 NOTE — PROCEDURES
2-LEVEL LUMBAR TRANSFORAMINAL   NAME:  Stacey Pearson    MR #:    [id number] :  3/4/1940     PHYSICIAN:  Ariel Rutledge        Operative Report    DATE OF PROCEDURE: 2021   PREOPERATIVE DIAGNOSES: 1.  Lumbar stenosis without neurogenic claudication removed. Each site was cleaned. Band-Aids were applied. The patient was transferred to the cart and into PAR. The patient was given discharge instructions and will follow up in the clinic as scheduled.   Throughout the whole procedure, the patient's pul

## 2021-05-21 ENCOUNTER — OFFICE VISIT (OUTPATIENT)
Dept: INTERNAL MEDICINE CLINIC | Facility: CLINIC | Age: 81
End: 2021-05-21
Payer: MEDICARE

## 2021-05-21 VITALS
DIASTOLIC BLOOD PRESSURE: 82 MMHG | OXYGEN SATURATION: 96 % | RESPIRATION RATE: 18 BRPM | SYSTOLIC BLOOD PRESSURE: 162 MMHG | HEART RATE: 70 BPM | HEIGHT: 63 IN | BODY MASS INDEX: 34.44 KG/M2 | TEMPERATURE: 98 F | WEIGHT: 194.38 LBS

## 2021-05-21 DIAGNOSIS — I10 ESSENTIAL HYPERTENSION, BENIGN: Primary | ICD-10-CM

## 2021-05-21 DIAGNOSIS — Z00.00 LABORATORY EXAMINATION ORDERED AS PART OF A ROUTINE GENERAL MEDICAL EXAMINATION: ICD-10-CM

## 2021-05-21 PROCEDURE — 99213 OFFICE O/P EST LOW 20 MIN: CPT | Performed by: INTERNAL MEDICINE

## 2021-05-21 RX ORDER — BENAZEPRIL HYDROCHLORIDE AND HYDROCHLOROTHIAZIDE 20; 12.5 MG/1; MG/1
1 TABLET ORAL DAILY
Qty: 90 TABLET | Refills: 0 | Status: SHIPPED | OUTPATIENT
Start: 2021-05-21 | End: 2021-12-03

## 2021-05-21 RX ORDER — FENOFIBRATE 160 MG/1
160 TABLET ORAL DAILY
Qty: 90 TABLET | Refills: 1 | Status: SHIPPED | OUTPATIENT
Start: 2021-05-21 | End: 2021-12-16

## 2021-05-21 NOTE — PATIENT INSTRUCTIONS
Patient has not been taking the medicines as prescribed.  She is advised to take the metoprolol 75 mg twice a day

## 2021-05-21 NOTE — PROGRESS NOTES
Marguerite Luo  3/4/1940 is a [de-identified]year old female. Patient presents with:  Hypertension       HPI:   BP check.   Current Outpatient Medications   Medication Sig Dispense Refill   • Benazepril-hydroCHLOROthiazide 20-12.5 MG Oral Tab Take 1 tablet by mouth incontinence    • Weakness of right hand 4/18/2018      Social History:  Social History    Tobacco Use      Smoking status: Never Smoker      Smokeless tobacco: Never Used    Vaping Use      Vaping Use: Never used    Alcohol use: Not Currently      Alcohol MG Oral Tab; Take 1 tablet (1,000 mg total) by mouth 2 (two) times daily with meals.  -     Fenofibrate 160 MG Oral Tab; Take 1 tablet (160 mg total) by mouth daily.       Patient did see orthopedic hip is improving  Patient Instructions   Patient has not b

## 2021-05-29 DIAGNOSIS — M54.50 ACUTE MIDLINE LOW BACK PAIN WITHOUT SCIATICA: ICD-10-CM

## 2021-06-01 RX ORDER — FENOFIBRATE 160 MG/1
TABLET ORAL
Qty: 90 TABLET | Refills: 0 | OUTPATIENT
Start: 2021-06-01

## 2021-06-01 NOTE — TELEPHONE ENCOUNTER
Protocol passed  Fenofibrate 160mg  Filled 5/21/21 #90 1 refill     No protocol   Diclofenac sodium 50mg 4/22/21 #60 0 refills     LOV: 5/21/21   RTC: 6 weeks

## 2021-06-09 ENCOUNTER — HOSPITAL ENCOUNTER (OUTPATIENT)
Age: 81
Discharge: HOME | End: 2021-06-09
Payer: MEDICARE

## 2021-06-09 ENCOUNTER — APPOINTMENT (OUTPATIENT)
Dept: GENERAL RADIOLOGY | Age: 81
End: 2021-06-09
Attending: NURSE PRACTITIONER
Payer: MEDICARE

## 2021-06-09 VITALS
OXYGEN SATURATION: 97 % | RESPIRATION RATE: 16 BRPM | TEMPERATURE: 98 F | HEART RATE: 56 BPM | SYSTOLIC BLOOD PRESSURE: 150 MMHG | DIASTOLIC BLOOD PRESSURE: 65 MMHG

## 2021-06-09 DIAGNOSIS — S60.222A CONTUSION OF LEFT HAND, INITIAL ENCOUNTER: Primary | ICD-10-CM

## 2021-06-09 PROCEDURE — 73110 X-RAY EXAM OF WRIST: CPT | Performed by: NURSE PRACTITIONER

## 2021-06-09 PROCEDURE — 99213 OFFICE O/P EST LOW 20 MIN: CPT

## 2021-06-09 PROCEDURE — 73130 X-RAY EXAM OF HAND: CPT | Performed by: NURSE PRACTITIONER

## 2021-06-09 NOTE — ED INITIAL ASSESSMENT (HPI)
C/o fell yesterday and left hand must have hit on something hard. Left hand and wrist swollen and bruised.

## 2021-06-09 NOTE — ED PROVIDER NOTES
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       Positive for stated complaint: left hand injury  Other systems ar metacarpal joint which demonstrates adjacent hypertrophic changes consistent with osteoarthritis. No dislocation. Marked soft tissue swelling particularly along the dorsum of the hand. CONCLUSION:  No acute visible fracture.   See above descript Clinical Impression:  Contusion of left hand, initial encounter  (primary encounter diagnosis)     Disposition:  Discharge  6/9/2021 10:52 am    Follow-up:  Antwon Vogt MD  [address] [place] [address] [place] [address]  [address] [facility] [phone]    Call in

## 2021-06-14 RX ORDER — BENAZEPRIL HYDROCHLORIDE AND HYDROCHLOROTHIAZIDE 20; 12.5 MG/1; MG/1
1 TABLET ORAL DAILY
Qty: 90 TABLET | Refills: 0 | OUTPATIENT
Start: 2021-06-14

## 2021-06-18 ENCOUNTER — TELEPHONE (OUTPATIENT)
Dept: PAIN CLINIC | Facility: CLINIC | Age: 81
End: 2021-06-18

## 2021-06-18 ENCOUNTER — OFFICE VISIT (OUTPATIENT)
Dept: PHYSICAL MEDICINE AND REHAB | Facility: CLINIC | Age: 81
End: 2021-06-18
Payer: MEDICARE

## 2021-06-18 VITALS
HEART RATE: 53 BPM | BODY MASS INDEX: 34 KG/M2 | OXYGEN SATURATION: 96 % | WEIGHT: 194 LBS | SYSTOLIC BLOOD PRESSURE: 122 MMHG | DIASTOLIC BLOOD PRESSURE: 86 MMHG

## 2021-06-18 DIAGNOSIS — M51.26 HERNIATED NUCLEUS PULPOSUS, L2-3 RIGHT: ICD-10-CM

## 2021-06-18 DIAGNOSIS — M48.062 LUMBAR STENOSIS WITH NEUROGENIC CLAUDICATION: Primary | ICD-10-CM

## 2021-06-18 PROCEDURE — 99214 OFFICE O/P EST MOD 30 MIN: CPT | Performed by: PHYSICAL MEDICINE & REHABILITATION

## 2021-06-18 RX ORDER — GABAPENTIN 300 MG/1
300 CAPSULE ORAL 3 TIMES DAILY
Qty: 90 CAPSULE | Refills: 0 | Status: SHIPPED | OUTPATIENT
Start: 2021-06-18 | End: 2021-11-17

## 2021-06-18 NOTE — TELEPHONE ENCOUNTER
[address]  PRE-PROCEDURE INSTRUCTIONS WITHOUT SEDATION       Procedure Date: 6/25/2021  Time - 1-4 PM   Center for [facility] will contact you with your procedure and check in time. Location: [facility] located at [address] [place] [address].  Will Garcia., both parents are allowed). • Please bring your Insurance Card, Photo ID, List of Current Medications and Referral (if applicable) to your appointment. • Please note-No prescriptions will be written by Pain Clinic in OR on the day of procedure.  If you r schedule your procedure once approval is obtained. Please contact your insurance carrier to determine what your financial responsibility will be for the procedure(s).       Post-procedure instructions:        Please schedule a follow up visit within 2 to 4

## 2021-06-18 NOTE — TELEPHONE ENCOUNTER
Medical clearance needed- Aliza / Dr. Abrams    Meds to hold - supplements, diclofenac    Implanted cardiac device/SCS/PNS: None    Anesthesia Type: local    Office or CFS: CFS    Please list entire procedure name: left L2 and L3 transforaminal epidural stero

## 2021-06-18 NOTE — PROGRESS NOTES
Last procedure: Right L2 and L3 transforaminal epidural steroid injections done under fluoroscopic guidance with contrast enhancement.   Date: 05/14/21    Percentage of relief obtained: 0  Duration of relief: NA    Current Pain Score: 8/10

## 2021-06-18 NOTE — PROGRESS NOTES
Progress note    C/C:    HPI: 21-year-old female presents for follow-up. She has a history of severe lumbar stenosis, and also a history of severe primary osteoarthritis of left hip for which she has seen an orthopedic surgeon prior to seeing me.   She mos by mouth 2 (two) times daily. , Disp: 180 tablet, Rfl: 2  KRILL OIL OR, Take by mouth., Disp: , Rfl:   Multiple Vitamins-Minerals (ICAPS AREDS FORMULA) Oral Tab, Take 2 each by mouth 2 (two) times daily. , Disp: , Rfl:   Cholecalciferol (VITAMIN D3) 1000 uni

## 2021-06-18 NOTE — TELEPHONE ENCOUNTER
Patient stated her right lower back hurts and injection is for her Right side    Will confirm with Dr Eliseo Rayo. Scheduling form with face sheet and Insurance card [facility], Att: Lien Whitten. Confirmation received.

## 2021-06-22 ENCOUNTER — OFFICE VISIT (OUTPATIENT)
Dept: INTERNAL MEDICINE CLINIC | Facility: CLINIC | Age: 81
End: 2021-06-22
Payer: MEDICARE

## 2021-06-22 VITALS
WEIGHT: 194 LBS | BODY MASS INDEX: 34.37 KG/M2 | OXYGEN SATURATION: 98 % | SYSTOLIC BLOOD PRESSURE: 196 MMHG | DIASTOLIC BLOOD PRESSURE: 102 MMHG | HEART RATE: 85 BPM | RESPIRATION RATE: 18 BRPM | HEIGHT: 63 IN | TEMPERATURE: 98 F

## 2021-06-22 DIAGNOSIS — R22.9 LOCALIZED SOFT TISSUE SWELLING: Primary | ICD-10-CM

## 2021-06-22 PROCEDURE — 99213 OFFICE O/P EST LOW 20 MIN: CPT | Performed by: INTERNAL MEDICINE

## 2021-06-22 NOTE — PROGRESS NOTES
Author Flow LEE 3/4/1940 is a [de-identified]year old female. Patient presents with: Follow - Up      HPI:   Follow-up emergency room. Does have swelling which is asymptomatic.  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

## 2021-06-22 NOTE — PATIENT INSTRUCTIONS
X-rays reviewed. Patient has no symptoms - full range of movement.  Patient advised to observe advised against having it tapped

## 2021-06-25 ENCOUNTER — OFFICE VISIT (OUTPATIENT)
Dept: SURGERY | Facility: CLINIC | Age: 81
End: 2021-06-25

## 2021-06-25 DIAGNOSIS — M48.061 LUMBAR STENOSIS WITHOUT NEUROGENIC CLAUDICATION: Primary | ICD-10-CM

## 2021-06-25 DIAGNOSIS — M51.26 HERNIATED NUCLEUS PULPOSUS, L2-3 RIGHT: ICD-10-CM

## 2021-06-25 PROCEDURE — 64483 NJX AA&/STRD TFRM EPI L/S 1: CPT | Performed by: PHYSICAL MEDICINE & REHABILITATION

## 2021-06-25 PROCEDURE — 64484 NJX AA&/STRD TFRM EPI L/S EA: CPT | Performed by: PHYSICAL MEDICINE & REHABILITATION

## 2021-06-25 NOTE — PROCEDURES
2-LEVEL LUMBAR TRANSFORAMINAL   NAME:  Cliff Uriostegui    MR #:    [id number] :  3/4/1940     PHYSICIAN:  Ivet Rincon        Operative Report    DATE OF PROCEDURE: 2021   PREOPERATIVE DIAGNOSES: 1.  Lumbar stenosis without neurogenic claudication were applied. The patient was transferred to the cart and into [facility]. The patient was given discharge instructions and will follow up in the clinic as scheduled.   Throughout the whole procedure, the patient's pulse oximetry and vital signs were monitored a

## 2021-07-07 DIAGNOSIS — M54.50 ACUTE MIDLINE LOW BACK PAIN WITHOUT SCIATICA: ICD-10-CM

## 2021-07-10 DIAGNOSIS — I10 ESSENTIAL HYPERTENSION, BENIGN: ICD-10-CM

## 2021-07-12 RX ORDER — METOPROLOL TARTRATE 50 MG/1
TABLET, FILM COATED ORAL
Qty: 180 TABLET | Refills: 0 | Status: SHIPPED | OUTPATIENT
Start: 2021-07-12 | End: 2021-09-22

## 2021-07-12 NOTE — TELEPHONE ENCOUNTER
Protocol failed   Medication(s) to Refill:   Requested Prescriptions     Pending Prescriptions Disp Refills   • METOPROLOL TARTRATE 50 MG Oral Tab [Pharmacy Med Name: Metoprolol Tartrate 50 MG Oral Tablet] 180 tablet 0     Sig: Take 1 tablet by mouth twice

## 2021-07-13 ENCOUNTER — LAB ENCOUNTER (OUTPATIENT)
Dept: LAB | Age: 81
End: 2021-07-13
Attending: INTERNAL MEDICINE
Payer: MEDICARE

## 2021-07-13 DIAGNOSIS — Z00.00 LABORATORY EXAMINATION ORDERED AS PART OF A ROUTINE GENERAL MEDICAL EXAMINATION: ICD-10-CM

## 2021-07-13 LAB
ALBUMIN SERPL-MCNC: 3.5 G/DL (ref 3.4–5)
ALBUMIN/GLOB SERPL: 1.1 {RATIO} (ref 1–2)
ALP LIVER SERPL-CCNC: 31 U/L
ALT SERPL-CCNC: 24 U/L
ANION GAP SERPL CALC-SCNC: 6 MMOL/L (ref 0–18)
AST SERPL-CCNC: 19 U/L (ref 15–37)
BASOPHILS # BLD AUTO: 0.05 X10(3) UL (ref 0–0.2)
BASOPHILS NFR BLD AUTO: 0.7 %
BILIRUB SERPL-MCNC: 0.3 MG/DL (ref 0.1–2)
BILIRUB UR QL STRIP.AUTO: NEGATIVE
BUN BLD-MCNC: 22 MG/DL (ref 7–18)
BUN/CREAT SERPL: 19.6 (ref 10–20)
CALCIUM BLD-MCNC: 9.8 MG/DL (ref 8.5–10.1)
CHLORIDE SERPL-SCNC: 107 MMOL/L (ref 98–112)
CHOLEST SMN-MCNC: 216 MG/DL (ref ?–200)
CLARITY UR REFRACT.AUTO: CLEAR
CO2 SERPL-SCNC: 28 MMOL/L (ref 21–32)
COLOR UR AUTO: YELLOW
CREAT BLD-MCNC: 1.12 MG/DL
DEPRECATED RDW RBC AUTO: 47.9 FL (ref 35.1–46.3)
EOSINOPHIL # BLD AUTO: 0.54 X10(3) UL (ref 0–0.7)
EOSINOPHIL NFR BLD AUTO: 8 %
ERYTHROCYTE [DISTWIDTH] IN BLOOD BY AUTOMATED COUNT: 14.6 % (ref 11–15)
EST. AVERAGE GLUCOSE BLD GHB EST-MCNC: 126 MG/DL (ref 68–126)
GLOBULIN PLAS-MCNC: 3.3 G/DL (ref 2.8–4.4)
GLUCOSE BLD-MCNC: 100 MG/DL (ref 70–99)
GLUCOSE UR STRIP.AUTO-MCNC: NEGATIVE MG/DL
HBA1C MFR BLD HPLC: 6 % (ref ?–5.7)
HCT VFR BLD AUTO: 36.3 %
HDLC SERPL-MCNC: 33 MG/DL (ref 40–59)
HGB BLD-MCNC: 11.2 G/DL
IMM GRANULOCYTES # BLD AUTO: 0.05 X10(3) UL (ref 0–1)
IMM GRANULOCYTES NFR BLD: 0.7 %
KETONES UR STRIP.AUTO-MCNC: NEGATIVE MG/DL
LDLC SERPL CALC-MCNC: 136 MG/DL (ref ?–100)
LYMPHOCYTES # BLD AUTO: 2.33 X10(3) UL (ref 1–4)
LYMPHOCYTES NFR BLD AUTO: 34.7 %
M PROTEIN MFR SERPL ELPH: 6.8 G/DL (ref 6.4–8.2)
MCH RBC QN AUTO: 27.8 PG (ref 26–34)
MCHC RBC AUTO-ENTMCNC: 30.9 G/DL (ref 31–37)
MCV RBC AUTO: 90.1 FL
MONOCYTES # BLD AUTO: 0.89 X10(3) UL (ref 0.1–1)
MONOCYTES NFR BLD AUTO: 13.2 %
NEUTROPHILS # BLD AUTO: 2.86 X10 (3) UL (ref 1.5–7.7)
NEUTROPHILS # BLD AUTO: 2.86 X10(3) UL (ref 1.5–7.7)
NEUTROPHILS NFR BLD AUTO: 42.7 %
NITRITE UR QL STRIP.AUTO: NEGATIVE
NONHDLC SERPL-MCNC: 183 MG/DL (ref ?–130)
OSMOLALITY SERPL CALC.SUM OF ELEC: 295 MOSM/KG (ref 275–295)
PH UR STRIP.AUTO: 7 [PH] (ref 5–8)
PLATELET # BLD AUTO: 285 10(3)UL (ref 150–450)
POTASSIUM SERPL-SCNC: 4.4 MMOL/L (ref 3.5–5.1)
PROT UR STRIP.AUTO-MCNC: NEGATIVE MG/DL
RBC # BLD AUTO: 4.03 X10(6)UL
RBC UR QL AUTO: NEGATIVE
SODIUM SERPL-SCNC: 141 MMOL/L (ref 136–145)
SP GR UR STRIP.AUTO: 1.01 (ref 1–1.03)
T4 SERPL-MCNC: 8.8 UG/DL
TRIGL SERPL-MCNC: 263 MG/DL (ref 30–149)
TSI SER-ACNC: 2.31 MIU/ML (ref 0.36–3.74)
UROBILINOGEN UR STRIP.AUTO-MCNC: 2 MG/DL
VLDLC SERPL CALC-MCNC: 49 MG/DL (ref 0–30)
WBC # BLD AUTO: 6.7 X10(3) UL (ref 4–11)

## 2021-07-13 PROCEDURE — 84436 ASSAY OF TOTAL THYROXINE: CPT

## 2021-07-13 PROCEDURE — 84443 ASSAY THYROID STIM HORMONE: CPT

## 2021-07-13 PROCEDURE — 85025 COMPLETE CBC W/AUTO DIFF WBC: CPT

## 2021-07-13 PROCEDURE — 80061 LIPID PANEL: CPT

## 2021-07-13 PROCEDURE — 81001 URINALYSIS AUTO W/SCOPE: CPT

## 2021-07-13 PROCEDURE — 80053 COMPREHEN METABOLIC PANEL: CPT

## 2021-07-13 PROCEDURE — 36415 COLL VENOUS BLD VENIPUNCTURE: CPT

## 2021-07-13 PROCEDURE — 83036 HEMOGLOBIN GLYCOSYLATED A1C: CPT

## 2021-07-23 DIAGNOSIS — I10 ESSENTIAL HYPERTENSION, BENIGN: ICD-10-CM

## 2021-07-23 RX ORDER — AMLODIPINE BESYLATE 5 MG/1
TABLET ORAL
Qty: 180 TABLET | Refills: 0 | Status: SHIPPED | OUTPATIENT
Start: 2021-07-23 | End: 2021-10-20

## 2021-07-24 DIAGNOSIS — M54.50 ACUTE MIDLINE LOW BACK PAIN WITHOUT SCIATICA: ICD-10-CM

## 2021-09-01 DIAGNOSIS — M54.50 ACUTE MIDLINE LOW BACK PAIN WITHOUT SCIATICA: ICD-10-CM

## 2021-09-22 DIAGNOSIS — I10 ESSENTIAL HYPERTENSION, BENIGN: ICD-10-CM

## 2021-09-22 RX ORDER — METOPROLOL TARTRATE 50 MG/1
TABLET, FILM COATED ORAL
Qty: 180 TABLET | Refills: 0 | Status: SHIPPED | OUTPATIENT
Start: 2021-09-22 | End: 2021-10-29

## 2021-10-06 DIAGNOSIS — M54.50 ACUTE MIDLINE LOW BACK PAIN WITHOUT SCIATICA: ICD-10-CM

## 2021-10-07 NOTE — TELEPHONE ENCOUNTER
No Protocol     Requesting: diclofenac 50mg     LOV: 6/22/21   RTC: 4 weeks   Filled: 9/2/1 #60 0 refills   Recent Labs: 7/13/21     Upcoming OV: 10/21/21

## 2021-10-20 DIAGNOSIS — I10 ESSENTIAL HYPERTENSION, BENIGN: ICD-10-CM

## 2021-10-20 RX ORDER — AMLODIPINE BESYLATE 5 MG/1
TABLET ORAL
Qty: 180 TABLET | Refills: 0 | Status: SHIPPED | OUTPATIENT
Start: 2021-10-20 | End: 2022-01-27

## 2021-10-20 NOTE — TELEPHONE ENCOUNTER
Medication(s) to Refill:   Requested Prescriptions     Pending Prescriptions Disp Refills   • AMLODIPINE 5 MG Oral Tab [Pharmacy Med Name: amLODIPine Besylate 5 MG Oral Tablet] 180 tablet 0     Sig: Take 1 tablet by mouth twice daily       LOV:  6-

## 2021-10-21 ENCOUNTER — OFFICE VISIT (OUTPATIENT)
Dept: INTERNAL MEDICINE CLINIC | Facility: CLINIC | Age: 81
End: 2021-10-21
Payer: MEDICARE

## 2021-10-21 VITALS
SYSTOLIC BLOOD PRESSURE: 194 MMHG | HEIGHT: 63 IN | DIASTOLIC BLOOD PRESSURE: 96 MMHG | HEART RATE: 58 BPM | BODY MASS INDEX: 35.15 KG/M2 | WEIGHT: 198.38 LBS | TEMPERATURE: 98 F | OXYGEN SATURATION: 99 % | RESPIRATION RATE: 18 BRPM

## 2021-10-21 DIAGNOSIS — R73.02 GLUCOSE INTOLERANCE (IMPAIRED GLUCOSE TOLERANCE): ICD-10-CM

## 2021-10-21 DIAGNOSIS — E78.1 HYPERTRIGLYCERIDEMIA: ICD-10-CM

## 2021-10-21 DIAGNOSIS — I10 WHITE COAT SYNDROME WITH DIAGNOSIS OF HYPERTENSION: ICD-10-CM

## 2021-10-21 DIAGNOSIS — Z12.31 ENCOUNTER FOR SCREENING MAMMOGRAM FOR MALIGNANT NEOPLASM OF BREAST: ICD-10-CM

## 2021-10-21 DIAGNOSIS — D64.9 ANEMIA, UNSPECIFIED TYPE: ICD-10-CM

## 2021-10-21 DIAGNOSIS — R94.31 ABNORMAL EKG: ICD-10-CM

## 2021-10-21 DIAGNOSIS — Z00.00 ROUTINE GENERAL MEDICAL EXAMINATION AT A HEALTH CARE FACILITY: Primary | ICD-10-CM

## 2021-10-21 PROCEDURE — 93000 ELECTROCARDIOGRAM COMPLETE: CPT | Performed by: INTERNAL MEDICINE

## 2021-10-21 PROCEDURE — G0439 PPPS, SUBSEQ VISIT: HCPCS | Performed by: INTERNAL MEDICINE

## 2021-10-21 PROCEDURE — 99213 OFFICE O/P EST LOW 20 MIN: CPT | Performed by: INTERNAL MEDICINE

## 2021-10-21 NOTE — PROGRESS NOTES
Neema Bowers [facility] 3/4/1940 is a [de-identified]year old female.     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 Dysuria none. Nocturia None. no Urinary frequency. Occ Urinary incontinence. Musculoskeletal:   Arthritis No. Back pain no.  Joint pain occ knees and shoulders  Does se bilateral.   Tremors: no.   Varicose veins: not present. MUSCULOSKELETAL:   Cervical spines: normal.   L-S spines: normal.   Lower extremity joints: kassandra knee replaced.   Early hammertoe noted  Upper extremity joints: normal. Heberden and yazan nodes no all her medicines     The patient indicates understanding of these issues and agrees to the plan. The patient is asked to Return in about 4 days (around 10/25/2021).   Eliane Wright MD

## 2021-10-27 ENCOUNTER — HOSPITAL ENCOUNTER (OUTPATIENT)
Dept: CV DIAGNOSTICS | Facility: HOSPITAL | Age: 81
Discharge: HOME | End: 2021-10-27
Attending: INTERNAL MEDICINE
Payer: MEDICARE

## 2021-10-27 DIAGNOSIS — R94.31 ABNORMAL EKG: ICD-10-CM

## 2021-10-27 DIAGNOSIS — R73.02 GLUCOSE INTOLERANCE (IMPAIRED GLUCOSE TOLERANCE): ICD-10-CM

## 2021-10-27 DIAGNOSIS — E78.1 HYPERTRIGLYCERIDEMIA: ICD-10-CM

## 2021-10-27 PROCEDURE — 78452 HT MUSCLE IMAGE SPECT MULT: CPT | Performed by: INTERNAL MEDICINE

## 2021-10-27 PROCEDURE — 93018 CV STRESS TEST I&R ONLY: CPT | Performed by: INTERNAL MEDICINE

## 2021-10-27 PROCEDURE — 93017 CV STRESS TEST TRACING ONLY: CPT | Performed by: INTERNAL MEDICINE

## 2021-10-28 DIAGNOSIS — I10 ESSENTIAL HYPERTENSION, BENIGN: ICD-10-CM

## 2021-10-28 RX ORDER — AMLODIPINE BESYLATE 5 MG/1
TABLET ORAL
Qty: 180 TABLET | Refills: 0 | OUTPATIENT
Start: 2021-10-28

## 2021-10-29 RX ORDER — METOPROLOL TARTRATE 50 MG/1
TABLET, FILM COATED ORAL
Qty: 180 TABLET | Refills: 0 | Status: SHIPPED | OUTPATIENT
Start: 2021-10-29

## 2021-11-05 DIAGNOSIS — M54.50 ACUTE MIDLINE LOW BACK PAIN WITHOUT SCIATICA: ICD-10-CM

## 2021-11-08 NOTE — TELEPHONE ENCOUNTER
No Protocol     Requesting: diclofenac 50mg     LOV: 10/21/21   RTC: 4 days   Filled: 10/7/21 #60 0 refills   Recent Labs: 7/13/21     Upcoming OV: none scheduled

## 2021-11-10 PROBLEM — S80.02XA CONTUSION OF LEFT KNEE: Status: ACTIVE | Noted: 2021-11-10

## 2021-11-10 PROBLEM — Z96.652 STATUS POST LEFT KNEE REPLACEMENT: Status: ACTIVE | Noted: 2021-11-10

## 2021-11-15 DIAGNOSIS — M48.062 LUMBAR STENOSIS WITH NEUROGENIC CLAUDICATION: ICD-10-CM

## 2021-11-15 DIAGNOSIS — M51.26 HERNIATED NUCLEUS PULPOSUS, L2-3 RIGHT: ICD-10-CM

## 2021-11-15 NOTE — TELEPHONE ENCOUNTER
Medication: gabapentin 300 MG Oral Cap    Date of last refill: 06/18/21      Last office visit: 06/18/21  Due back to clinic per last office note:  na  Date next office visit scheduled:  12/01/21      Last OV note recommendation: Assessment and plan  1) ch

## 2021-11-16 NOTE — TELEPHONE ENCOUNTER
Spoke to patient, states she has been taking gabapentin 300 mg daily for buttocks pain. Did not realize Rx was to take TID until looked at bottle for refill. LOV 6/25/21 right L2 and L3 TFESI. NOV 12/1/21.

## 2021-11-17 RX ORDER — GABAPENTIN 300 MG/1
CAPSULE ORAL
Qty: 90 CAPSULE | Refills: 0 | Status: SHIPPED | OUTPATIENT
Start: 2021-11-17

## 2021-12-01 ENCOUNTER — OFFICE VISIT (OUTPATIENT)
Dept: PHYSICAL MEDICINE AND REHAB | Facility: CLINIC | Age: 81
End: 2021-12-01
Payer: MEDICARE

## 2021-12-01 VITALS
HEIGHT: 63 IN | WEIGHT: 198 LBS | DIASTOLIC BLOOD PRESSURE: 70 MMHG | SYSTOLIC BLOOD PRESSURE: 164 MMHG | BODY MASS INDEX: 35.08 KG/M2 | OXYGEN SATURATION: 95 % | HEART RATE: 58 BPM

## 2021-12-01 DIAGNOSIS — M79.18 CHRONIC BUTTOCK PAIN: ICD-10-CM

## 2021-12-01 DIAGNOSIS — M51.26 HERNIATED NUCLEUS PULPOSUS, L2-3 RIGHT: ICD-10-CM

## 2021-12-01 DIAGNOSIS — G89.29 CHRONIC BUTTOCK PAIN: ICD-10-CM

## 2021-12-01 DIAGNOSIS — M48.062 LUMBAR STENOSIS WITH NEUROGENIC CLAUDICATION: Primary | ICD-10-CM

## 2021-12-01 PROCEDURE — 99214 OFFICE O/P EST MOD 30 MIN: CPT | Performed by: PHYSICAL MEDICINE & REHABILITATION

## 2021-12-01 RX ORDER — GABAPENTIN 300 MG/1
300 CAPSULE ORAL 3 TIMES DAILY
Qty: 270 CAPSULE | Refills: 1 | Status: SHIPPED | OUTPATIENT
Start: 2021-12-14

## 2021-12-01 NOTE — PATIENT INSTRUCTIONS
Continue with the gabapentin, and come see me sometime around April. Please make sure you have your labs done that Dr. Letty Hassan ordered before you come see me again.

## 2021-12-01 NOTE — PROGRESS NOTES
Progress note    C/C: buttock pain    HPI: 20-year-old female presents for follow-up. Underwent a right L2 and L3 transforaminal epidural steroid injection under fluoroscopy on 6/25/2021.  80% relief.   She is was doing well until around August or [place] Moor DO  Physical Medicine and [address] [place] [address]

## 2021-12-03 RX ORDER — BENAZEPRIL HYDROCHLORIDE AND HYDROCHLOROTHIAZIDE 20; 12.5 MG/1; MG/1
TABLET ORAL
Qty: 90 TABLET | Refills: 0 | Status: SHIPPED | OUTPATIENT
Start: 2021-12-03

## 2021-12-03 NOTE — TELEPHONE ENCOUNTER
Medication(s) to Refill:   Requested Prescriptions     Pending Prescriptions Disp Refills   • METFORMIN HCL 1000 MG Oral Tab [Pharmacy Med Name: metFORMIN HCl 1000 MG Oral Tablet] 180 tablet 0     Sig: TAKE 1 TABLET BY MOUTH TWICE DAILY WITH MEALS   • JUAN C

## 2021-12-08 DIAGNOSIS — M54.50 ACUTE MIDLINE LOW BACK PAIN WITHOUT SCIATICA: ICD-10-CM

## 2021-12-09 ENCOUNTER — MED REC SCAN ONLY (OUTPATIENT)
Dept: PHYSICAL MEDICINE AND REHAB | Facility: CLINIC | Age: 81
End: 2021-12-09

## 2021-12-09 NOTE — TELEPHONE ENCOUNTER
No Protocol     Requesting: diclofenac 50mg     LOV: 10/21/21   RTC: 4 days for bp   Filled: 11/8/21 #60 0 refills   Recent Labs: 7/13/21     Upcoming OV: none scheduled

## 2021-12-16 RX ORDER — FENOFIBRATE 160 MG/1
TABLET ORAL
Qty: 90 TABLET | Refills: 0 | Status: SHIPPED | OUTPATIENT
Start: 2021-12-16

## 2022-01-05 DIAGNOSIS — M54.50 ACUTE MIDLINE LOW BACK PAIN WITHOUT SCIATICA: ICD-10-CM

## 2022-01-05 NOTE — TELEPHONE ENCOUNTER
LOV: 10/21/2021 with Dr. Lion Kidd  RTC: 4 days  Last Relevant Labs: 7/13/2021  Filled: 12/9/2021    #60 with 0 refills    Future Appointments   Date Time Provider Isiah Rodríguez   3/1/2022  9:00 AM Celeste Awad MD [address] [place] [address]

## 2022-01-12 ENCOUNTER — TELEPHONE (OUTPATIENT)
Dept: INTERNAL MEDICINE CLINIC | Facility: CLINIC | Age: 82
End: 2022-01-12

## 2022-01-12 NOTE — TELEPHONE ENCOUNTER
Made copies of signed Parking Placard renewal card. One copy to scan, one in blue folder pod #2 and the original was put in envelope provided and given to the  to be mailed.

## 2022-01-12 NOTE — TELEPHONE ENCOUNTER
Pt dropping of form for parking placard to be filled out. Placed in Dr. Shaista Stratton fax folder. Please call pt when form is completed and mailed out. Stamped envelope included.

## 2022-01-14 ENCOUNTER — HOSPITAL ENCOUNTER (OUTPATIENT)
Age: 82
Discharge: HOME | End: 2022-01-14
Attending: EMERGENCY MEDICINE
Payer: MEDICARE

## 2022-01-14 VITALS
DIASTOLIC BLOOD PRESSURE: 58 MMHG | RESPIRATION RATE: 18 BRPM | HEART RATE: 66 BPM | OXYGEN SATURATION: 97 % | TEMPERATURE: 98 F | SYSTOLIC BLOOD PRESSURE: 163 MMHG

## 2022-01-14 DIAGNOSIS — S43.421A SPRAIN OF RIGHT ROTATOR CUFF CAPSULE, INITIAL ENCOUNTER: Primary | ICD-10-CM

## 2022-01-14 PROCEDURE — 99213 OFFICE O/P EST LOW 20 MIN: CPT

## 2022-01-14 PROCEDURE — 99212 OFFICE O/P EST SF 10 MIN: CPT

## 2022-01-14 NOTE — ED PROVIDER NOTES
Patient Seen in: [facility]      History   Patient presents with:  Shoulder Pain    Stated Complaint: right shoulder x 2 weeks    Subjective:   HPI    [de-identified] woman who was lifting some furniture 2 weeks ago and has had chronic right signs reviewed. All other systems reviewed and negative except as noted above.     Physical Exam     ED Triage Vitals [01/14/22 0955]   BP (!) 163/58   Pulse 66   Resp 18   Temp 97.6 °F (36.4 °C)   Temp src Temporal   SpO2 97 %   O2 Device None (Room a

## 2022-01-14 NOTE — ED INITIAL ASSESSMENT (HPI)
C/o right shoulder pain for 2 weeks after moving dressers, night stands and boxes. Hurts with movement.

## 2022-01-26 DIAGNOSIS — I10 ESSENTIAL HYPERTENSION, BENIGN: ICD-10-CM

## 2022-01-26 NOTE — TELEPHONE ENCOUNTER
Requesting:   Name from pharmacy: amLODIPine Besylate 5 MG Oral Tablet          Will file in chart as: AMLODIPINE 5 MG Oral Tab    Sig: Take 1 tablet by mouth twice daily    Disp:  180 tablet    Refills:  0    Start: 1/26/2022    Class: Normal    Non-formu

## 2022-01-27 DIAGNOSIS — I10 ESSENTIAL HYPERTENSION, BENIGN: ICD-10-CM

## 2022-01-27 RX ORDER — AMLODIPINE BESYLATE 5 MG/1
TABLET ORAL
Qty: 180 TABLET | Refills: 0 | Status: SHIPPED | OUTPATIENT
Start: 2022-01-27

## 2022-01-28 RX ORDER — AMLODIPINE BESYLATE 5 MG/1
TABLET ORAL
Qty: 180 TABLET | Refills: 0 | OUTPATIENT
Start: 2022-01-28

## 2022-01-31 ENCOUNTER — TELEPHONE (OUTPATIENT)
Dept: ORTHOPEDICS CLINIC | Facility: CLINIC | Age: 82
End: 2022-01-31

## 2022-01-31 DIAGNOSIS — M25.511 RIGHT SHOULDER PAIN, UNSPECIFIED CHRONICITY: Primary | ICD-10-CM

## 2022-01-31 NOTE — TELEPHONE ENCOUNTER
Please enter an Xray RX for a RT Shoulder for  this patient's upcoming appointment, as the patient has not had any imaging completed. Patient was instructed to get X-rays before appt.       PLEASE REPLY TO THIS MESSAGE when the order is put in EPIC so that

## 2022-02-04 ENCOUNTER — HOSPITAL ENCOUNTER (OUTPATIENT)
Dept: GENERAL RADIOLOGY | Age: 82
Discharge: HOME | End: 2022-02-04
Attending: ORTHOPAEDIC SURGERY
Payer: MEDICARE

## 2022-02-04 ENCOUNTER — OFFICE VISIT (OUTPATIENT)
Dept: ORTHOPEDICS CLINIC | Facility: CLINIC | Age: 82
End: 2022-02-04
Payer: MEDICARE

## 2022-02-04 DIAGNOSIS — M25.511 RIGHT SHOULDER PAIN, UNSPECIFIED CHRONICITY: ICD-10-CM

## 2022-02-04 DIAGNOSIS — M75.01 ADHESIVE CAPSULITIS OF RIGHT SHOULDER: Primary | ICD-10-CM

## 2022-02-04 PROCEDURE — 99204 OFFICE O/P NEW MOD 45 MIN: CPT | Performed by: ORTHOPAEDIC SURGERY

## 2022-02-04 PROCEDURE — 73030 X-RAY EXAM OF SHOULDER: CPT | Performed by: ORTHOPAEDIC SURGERY

## 2022-02-04 PROCEDURE — 20610 DRAIN/INJ JOINT/BURSA W/O US: CPT | Performed by: ORTHOPAEDIC SURGERY

## 2022-02-04 RX ORDER — TRIAMCINOLONE ACETONIDE 40 MG/ML
40 INJECTION, SUSPENSION INTRA-ARTICULAR; INTRAMUSCULAR ONCE
Status: COMPLETED | OUTPATIENT
Start: 2022-02-04 | End: 2022-02-04

## 2022-02-04 RX ADMIN — TRIAMCINOLONE ACETONIDE 40 MG: 40 INJECTION, SUSPENSION INTRA-ARTICULAR; INTRAMUSCULAR at 11:15:00

## 2022-02-04 NOTE — PROCEDURES
Right Shoulder Glenohumeral Joint Injection    Name: Irean Kawasaki   MRN: [id number]  Date: 2/4/2022     Clinical Indications:   Adhesive Capsulitis. After informed consent, the injection site was marked, sterilized with topical chlorhexidine antiseptic, and locally anesthetized with skin refrigerant. The patient was seated upright and the shoulder was exposed. Using sterile technique: 1 mL of 30mg/mL of Ketorolac, 2 mL of 0.5% Bupivicaine, 2 mL of 1% Lidocaine, and 1 mg of 40mg/mL of Triamcinolone (Kenalog) was injected with a Anterior approach utilizing a 21 gauge needle. A band-aid was applied. The patient tolerated the procedure well. Disposition:   Proceed with physical therapy and return for repeat evaluation in 6 weeks. No imaging required at next visit. Carisa Bustos. Mega Sarah MD  Knee, Shoulder, & Elbow Surgery / Sports Medicine Specialist  [facility]  [facility] , [place] , [address] [place] [address]. [email or web address]  t: [phone]  o: [phone]  f: [phone]

## 2022-03-01 ENCOUNTER — MED REC SCAN ONLY (OUTPATIENT)
Dept: ORTHOPEDICS CLINIC | Facility: CLINIC | Age: 82
End: 2022-03-01

## 2022-03-04 NOTE — TELEPHONE ENCOUNTER
No Protocol     Requesting: diclofenac 50mg     LOV: 10/21/21   RTC: 4 days   Filled: 2/9/22 #60 0 refills   Recent Labs: 7/13/21     Upcoming OV: none scheduled

## 2022-03-10 ENCOUNTER — HOSPITAL ENCOUNTER (OUTPATIENT)
Dept: GENERAL RADIOLOGY | Age: 82
Discharge: HOME | End: 2022-03-10
Attending: INTERNAL MEDICINE
Payer: MEDICARE

## 2022-03-10 ENCOUNTER — OFFICE VISIT (OUTPATIENT)
Dept: INTERNAL MEDICINE CLINIC | Facility: CLINIC | Age: 82
End: 2022-03-10
Payer: MEDICARE

## 2022-03-10 VITALS
WEIGHT: 201.19 LBS | SYSTOLIC BLOOD PRESSURE: 200 MMHG | TEMPERATURE: 98 F | HEART RATE: 66 BPM | OXYGEN SATURATION: 99 % | RESPIRATION RATE: 18 BRPM | HEIGHT: 63 IN | DIASTOLIC BLOOD PRESSURE: 90 MMHG | BODY MASS INDEX: 35.65 KG/M2

## 2022-03-10 DIAGNOSIS — I10 ESSENTIAL HYPERTENSION, BENIGN: ICD-10-CM

## 2022-03-10 DIAGNOSIS — M25.552 HIP PAIN, CHRONIC, LEFT: Primary | ICD-10-CM

## 2022-03-10 DIAGNOSIS — M25.552 HIP PAIN, CHRONIC, LEFT: ICD-10-CM

## 2022-03-10 DIAGNOSIS — G89.29 HIP PAIN, CHRONIC, LEFT: ICD-10-CM

## 2022-03-10 DIAGNOSIS — G89.29 HIP PAIN, CHRONIC, LEFT: Primary | ICD-10-CM

## 2022-03-10 PROBLEM — S80.02XA CONTUSION OF LEFT KNEE: Status: RESOLVED | Noted: 2021-11-10 | Resolved: 2022-03-10

## 2022-03-10 PROBLEM — Z96.652 STATUS POST LEFT KNEE REPLACEMENT: Chronic | Status: ACTIVE | Noted: 2021-11-10

## 2022-03-10 PROCEDURE — 99213 OFFICE O/P EST LOW 20 MIN: CPT | Performed by: INTERNAL MEDICINE

## 2022-03-10 PROCEDURE — 73502 X-RAY EXAM HIP UNI 2-3 VIEWS: CPT | Performed by: INTERNAL MEDICINE

## 2022-03-10 RX ORDER — METOPROLOL TARTRATE 50 MG/1
100 TABLET, FILM COATED ORAL 2 TIMES DAILY
Qty: 180 TABLET | Refills: 0 | COMMUNITY
Start: 2022-03-10

## 2022-03-18 RX ORDER — FENOFIBRATE 160 MG/1
TABLET ORAL
Qty: 90 TABLET | Refills: 0 | Status: SHIPPED | OUTPATIENT
Start: 2022-03-18

## 2022-03-18 NOTE — TELEPHONE ENCOUNTER
Protocol passed     Requesting: fenofibrate 160mg   LOV: 3/10/22   RTC: 1 week   Filled: 12/16/21 #90 0 refills   Recent Labs: 7/13/21     Upcoming OV: none scheduled

## 2022-04-01 ENCOUNTER — MED REC SCAN ONLY (OUTPATIENT)
Dept: ORTHOPEDICS CLINIC | Facility: CLINIC | Age: 82
End: 2022-04-01

## 2022-04-01 RX ORDER — METOPROLOL TARTRATE 50 MG/1
TABLET, FILM COATED ORAL
Qty: 180 TABLET | Refills: 0 | OUTPATIENT
Start: 2022-04-01

## 2022-04-04 RX ORDER — METOPROLOL TARTRATE 50 MG/1
TABLET, FILM COATED ORAL
Qty: 180 TABLET | Refills: 0 | OUTPATIENT
Start: 2022-04-04

## 2022-04-05 ENCOUNTER — OFFICE VISIT (OUTPATIENT)
Dept: INTERNAL MEDICINE CLINIC | Facility: CLINIC | Age: 82
End: 2022-04-05
Payer: MEDICARE

## 2022-04-05 VITALS
WEIGHT: 199 LBS | HEART RATE: 80 BPM | DIASTOLIC BLOOD PRESSURE: 78 MMHG | BODY MASS INDEX: 35.26 KG/M2 | OXYGEN SATURATION: 96 % | RESPIRATION RATE: 18 BRPM | HEIGHT: 63 IN | SYSTOLIC BLOOD PRESSURE: 190 MMHG | TEMPERATURE: 98 F

## 2022-04-05 DIAGNOSIS — I10 ESSENTIAL HYPERTENSION, BENIGN: ICD-10-CM

## 2022-04-05 PROCEDURE — 99213 OFFICE O/P EST LOW 20 MIN: CPT | Performed by: INTERNAL MEDICINE

## 2022-04-05 RX ORDER — METOPROLOL TARTRATE 100 MG/1
100 TABLET ORAL 2 TIMES DAILY
Qty: 180 TABLET | Refills: 3 | Status: SHIPPED | OUTPATIENT
Start: 2022-04-05 | End: 2022-07-04

## 2022-04-08 RX ORDER — AMLODIPINE BESYLATE 5 MG/1
TABLET ORAL
Qty: 180 TABLET | Refills: 0 | Status: SHIPPED | OUTPATIENT
Start: 2022-04-08

## 2022-04-08 NOTE — TELEPHONE ENCOUNTER
Protocol failed   Metformin 1000mg filled 12/3/21 #180 0 refills     No Protocol   Diclofenac 50mg filled 4/5/22 #60 0 refills     Protocol passed   Amlodipine 5mg filled 1/27/22 #180 0 refills     LOV: 4/5/22   RTC: 4 weeks   Recent Labs: 7/1321     Upcoming OV: 4/26/22

## 2022-04-15 ENCOUNTER — OFFICE VISIT (OUTPATIENT)
Dept: ORTHOPEDICS CLINIC | Facility: CLINIC | Age: 82
End: 2022-04-15
Payer: MEDICARE

## 2022-04-15 DIAGNOSIS — M75.01 ADHESIVE CAPSULITIS OF RIGHT SHOULDER: Primary | ICD-10-CM

## 2022-04-15 PROCEDURE — 99213 OFFICE O/P EST LOW 20 MIN: CPT | Performed by: PHYSICIAN ASSISTANT

## 2022-04-15 RX ORDER — MELOXICAM 15 MG/1
15 TABLET ORAL DAILY
Qty: 30 TABLET | Refills: 2 | Status: SHIPPED | OUTPATIENT
Start: 2022-04-15 | End: 2022-05-15

## 2022-04-26 ENCOUNTER — OFFICE VISIT (OUTPATIENT)
Dept: INTERNAL MEDICINE CLINIC | Facility: CLINIC | Age: 82
End: 2022-04-26
Payer: MEDICARE

## 2022-04-26 VITALS
HEIGHT: 63 IN | RESPIRATION RATE: 18 BRPM | WEIGHT: 196 LBS | OXYGEN SATURATION: 96 % | HEART RATE: 51 BPM | DIASTOLIC BLOOD PRESSURE: 72 MMHG | TEMPERATURE: 98 F | BODY MASS INDEX: 34.73 KG/M2 | SYSTOLIC BLOOD PRESSURE: 176 MMHG

## 2022-04-26 DIAGNOSIS — I10 ESSENTIAL HYPERTENSION, BENIGN: ICD-10-CM

## 2022-04-26 PROCEDURE — 99213 OFFICE O/P EST LOW 20 MIN: CPT | Performed by: INTERNAL MEDICINE

## 2022-04-26 RX ORDER — HYDRALAZINE HYDROCHLORIDE 25 MG/1
25 TABLET, FILM COATED ORAL 3 TIMES DAILY
Qty: 90 TABLET | Refills: 0 | Status: SHIPPED | OUTPATIENT
Start: 2022-04-26 | End: 2022-05-26

## 2022-04-26 RX ORDER — METOPROLOL TARTRATE 100 MG/1
50 TABLET ORAL 2 TIMES DAILY
Qty: 180 TABLET | Refills: 3 | COMMUNITY
Start: 2022-04-26

## 2022-04-26 NOTE — PATIENT INSTRUCTIONS
Metoprolol decreased in view of bradycardia. Hydralazine ordered. Patient advised to go for pending blood work.

## 2022-04-27 ENCOUNTER — LAB ENCOUNTER (OUTPATIENT)
Dept: LAB | Age: 82
End: 2022-04-27
Attending: INTERNAL MEDICINE
Payer: COMMERCIAL

## 2022-04-27 DIAGNOSIS — I10 WHITE COAT SYNDROME WITH DIAGNOSIS OF HYPERTENSION: ICD-10-CM

## 2022-04-27 DIAGNOSIS — D64.9 ANEMIA, UNSPECIFIED TYPE: ICD-10-CM

## 2022-04-27 DIAGNOSIS — R73.02 GLUCOSE INTOLERANCE (IMPAIRED GLUCOSE TOLERANCE): ICD-10-CM

## 2022-04-27 DIAGNOSIS — E78.1 HYPERTRIGLYCERIDEMIA: ICD-10-CM

## 2022-04-27 LAB
ANION GAP SERPL CALC-SCNC: 7 MMOL/L (ref 0–18)
BASOPHILS # BLD AUTO: 0.04 X10(3) UL (ref 0–0.2)
BASOPHILS NFR BLD AUTO: 0.5 %
BUN BLD-MCNC: 25 MG/DL (ref 7–18)
CALCIUM BLD-MCNC: 10.4 MG/DL (ref 8.5–10.1)
CHLORIDE SERPL-SCNC: 108 MMOL/L (ref 98–112)
CHOLEST SERPL-MCNC: 189 MG/DL (ref ?–200)
CO2 SERPL-SCNC: 26 MMOL/L (ref 21–32)
CREAT BLD-MCNC: 0.99 MG/DL
DEPRECATED HBV CORE AB SER IA-ACNC: 45.5 NG/ML
EOSINOPHIL # BLD AUTO: 0.21 X10(3) UL (ref 0–0.7)
EOSINOPHIL NFR BLD AUTO: 2.6 %
ERYTHROCYTE [DISTWIDTH] IN BLOOD BY AUTOMATED COUNT: 13.9 %
EST. AVERAGE GLUCOSE BLD GHB EST-MCNC: 120 MG/DL (ref 68–126)
FASTING PATIENT LIPID ANSWER: YES
FASTING STATUS PATIENT QL REPORTED: YES
FOLATE SERPL-MCNC: 39.9 NG/ML (ref 8.7–?)
GLUCOSE BLD-MCNC: 101 MG/DL (ref 70–99)
HBA1C MFR BLD: 5.8 % (ref ?–5.7)
HCT VFR BLD AUTO: 36.4 %
HDLC SERPL-MCNC: 41 MG/DL (ref 40–59)
HGB BLD-MCNC: 11 G/DL
IMM GRANULOCYTES # BLD AUTO: 0.06 X10(3) UL (ref 0–1)
IMM GRANULOCYTES NFR BLD: 0.7 %
IRON SATN MFR SERPL: 10 %
IRON SERPL-MCNC: 58 UG/DL
LDLC SERPL CALC-MCNC: 106 MG/DL (ref ?–100)
LYMPHOCYTES # BLD AUTO: 2.34 X10(3) UL (ref 1–4)
LYMPHOCYTES NFR BLD AUTO: 28.6 %
MCH RBC QN AUTO: 26.4 PG (ref 26–34)
MCHC RBC AUTO-ENTMCNC: 30.2 G/DL (ref 31–37)
MCV RBC AUTO: 87.5 FL
MONOCYTES # BLD AUTO: 0.83 X10(3) UL (ref 0.1–1)
MONOCYTES NFR BLD AUTO: 10.1 %
NEUTROPHILS # BLD AUTO: 4.71 X10 (3) UL (ref 1.5–7.7)
NEUTROPHILS # BLD AUTO: 4.71 X10(3) UL (ref 1.5–7.7)
NEUTROPHILS NFR BLD AUTO: 57.5 %
NONHDLC SERPL-MCNC: 148 MG/DL (ref ?–130)
OSMOLALITY SERPL CALC.SUM OF ELEC: 297 MOSM/KG (ref 275–295)
PLATELET # BLD AUTO: 342 10(3)UL (ref 150–450)
POTASSIUM SERPL-SCNC: 4.1 MMOL/L (ref 3.5–5.1)
RBC # BLD AUTO: 4.16 X10(6)UL
SODIUM SERPL-SCNC: 141 MMOL/L (ref 136–145)
TIBC SERPL-MCNC: 572 UG/DL (ref 240–450)
TRANSFERRIN SERPL-MCNC: 384 MG/DL (ref 200–360)
TRIGL SERPL-MCNC: 241 MG/DL (ref 30–149)
VIT B12 SERPL-MCNC: 104 PG/ML (ref 193–986)
VLDLC SERPL CALC-MCNC: 41 MG/DL (ref 0–30)
WBC # BLD AUTO: 8.2 X10(3) UL (ref 4–11)

## 2022-04-27 PROCEDURE — 83036 HEMOGLOBIN GLYCOSYLATED A1C: CPT

## 2022-04-27 PROCEDURE — 83550 IRON BINDING TEST: CPT

## 2022-04-27 PROCEDURE — 83540 ASSAY OF IRON: CPT

## 2022-04-27 PROCEDURE — 80061 LIPID PANEL: CPT

## 2022-04-27 PROCEDURE — 80048 BASIC METABOLIC PNL TOTAL CA: CPT

## 2022-04-27 PROCEDURE — 82746 ASSAY OF FOLIC ACID SERUM: CPT

## 2022-04-27 PROCEDURE — 82728 ASSAY OF FERRITIN: CPT

## 2022-04-27 PROCEDURE — 85025 COMPLETE CBC W/AUTO DIFF WBC: CPT

## 2022-04-27 PROCEDURE — 82607 VITAMIN B-12: CPT

## 2022-04-27 PROCEDURE — 36415 COLL VENOUS BLD VENIPUNCTURE: CPT

## 2022-05-03 ENCOUNTER — OFFICE VISIT (OUTPATIENT)
Dept: INTERNAL MEDICINE CLINIC | Facility: CLINIC | Age: 82
End: 2022-05-03
Payer: MEDICARE

## 2022-05-03 VITALS
RESPIRATION RATE: 16 BRPM | HEIGHT: 63 IN | DIASTOLIC BLOOD PRESSURE: 80 MMHG | WEIGHT: 196 LBS | BODY MASS INDEX: 34.73 KG/M2 | TEMPERATURE: 98 F | HEART RATE: 83 BPM | OXYGEN SATURATION: 96 % | SYSTOLIC BLOOD PRESSURE: 164 MMHG

## 2022-05-03 DIAGNOSIS — I10 WHITE COAT SYNDROME WITH DIAGNOSIS OF HYPERTENSION: Primary | ICD-10-CM

## 2022-05-03 DIAGNOSIS — E53.8 B12 DEFICIENCY: ICD-10-CM

## 2022-05-03 DIAGNOSIS — D64.9 ANEMIA, UNSPECIFIED TYPE: ICD-10-CM

## 2022-05-03 PROCEDURE — 96372 THER/PROPH/DIAG INJ SC/IM: CPT | Performed by: INTERNAL MEDICINE

## 2022-05-03 PROCEDURE — 99214 OFFICE O/P EST MOD 30 MIN: CPT | Performed by: INTERNAL MEDICINE

## 2022-05-03 RX ORDER — FERROUS SULFATE 137(45) MG
142 TABLET, EXTENDED RELEASE ORAL DAILY
Qty: 90 TABLET | Refills: 3 | Status: SHIPPED | OUTPATIENT
Start: 2022-05-03 | End: 2022-08-01

## 2022-05-03 RX ORDER — BENAZEPRIL HYDROCHLORIDE AND HYDROCHLOROTHIAZIDE 20; 12.5 MG/1; MG/1
2 TABLET ORAL DAILY
Qty: 90 TABLET | Refills: 0 | COMMUNITY
Start: 2022-05-03

## 2022-05-03 RX ORDER — CYANOCOBALAMIN 1000 UG/ML
1000 INJECTION INTRAMUSCULAR; SUBCUTANEOUS ONCE
Status: SHIPPED | OUTPATIENT
Start: 2022-05-03

## 2022-05-03 RX ORDER — CYANOCOBALAMIN 1000 UG/ML
1000 INJECTION INTRAMUSCULAR; SUBCUTANEOUS ONCE
Status: COMPLETED | OUTPATIENT
Start: 2022-05-03 | End: 2022-05-03

## 2022-05-03 RX ADMIN — CYANOCOBALAMIN 1000 MCG: 1000 INJECTION INTRAMUSCULAR; SUBCUTANEOUS at 11:03:00

## 2022-05-03 NOTE — PATIENT INSTRUCTIONS
BP check 1 week.   Patient will need B12 shots every month for a total of 4 doses after which she will be placed on p.o. meds

## 2022-05-10 ENCOUNTER — NURSE ONLY (OUTPATIENT)
Dept: INTERNAL MEDICINE CLINIC | Facility: CLINIC | Age: 82
End: 2022-05-10
Payer: MEDICARE

## 2022-05-10 ENCOUNTER — TELEPHONE (OUTPATIENT)
Dept: INTERNAL MEDICINE CLINIC | Facility: CLINIC | Age: 82
End: 2022-05-10

## 2022-05-10 PROCEDURE — 96372 THER/PROPH/DIAG INJ SC/IM: CPT | Performed by: INTERNAL MEDICINE

## 2022-05-10 RX ADMIN — CYANOCOBALAMIN 1000 MCG: 1000 INJECTION INTRAMUSCULAR; SUBCUTANEOUS at 12:59:00

## 2022-05-10 NOTE — TELEPHONE ENCOUNTER
Please clarify orders    Patient is scheduled for Nurse Visit today at 1:30 for B12 injection. She last had it on 5/3/22. However, your last note states monthly. How would you like us to proceed, weekly or monthly? Thank you. Patient Instructions   BP check 1 week.   Patient will need B12 shots every month for a total of 4 doses after which she will be placed on p.o. meds

## 2022-05-10 NOTE — TELEPHONE ENCOUNTER
Patient should get that shot weekly for a total of 4 doses after that she takes p.o.  B12 500 MCG over-the-counter

## 2022-05-12 RX ORDER — BENAZEPRIL HYDROCHLORIDE AND HYDROCHLOROTHIAZIDE 20; 12.5 MG/1; MG/1
TABLET ORAL
Qty: 90 TABLET | Refills: 0 | Status: SHIPPED | OUTPATIENT
Start: 2022-05-12

## 2022-05-13 ENCOUNTER — OFFICE VISIT (OUTPATIENT)
Dept: INTERNAL MEDICINE CLINIC | Facility: CLINIC | Age: 82
End: 2022-05-13
Payer: MEDICARE

## 2022-05-13 ENCOUNTER — LAB ENCOUNTER (OUTPATIENT)
Dept: LAB | Age: 82
End: 2022-05-13
Attending: INTERNAL MEDICINE
Payer: MEDICARE

## 2022-05-13 VITALS
SYSTOLIC BLOOD PRESSURE: 170 MMHG | RESPIRATION RATE: 18 BRPM | HEIGHT: 63 IN | WEIGHT: 196 LBS | OXYGEN SATURATION: 96 % | BODY MASS INDEX: 34.73 KG/M2 | HEART RATE: 69 BPM | TEMPERATURE: 98 F | DIASTOLIC BLOOD PRESSURE: 70 MMHG

## 2022-05-13 DIAGNOSIS — R10.2 PELVIC PAIN IN FEMALE: ICD-10-CM

## 2022-05-13 DIAGNOSIS — M25.552 HIP PAIN, LEFT: ICD-10-CM

## 2022-05-13 DIAGNOSIS — R10.2 PELVIC PAIN IN FEMALE: Primary | ICD-10-CM

## 2022-05-13 LAB
ALBUMIN SERPL-MCNC: 4 G/DL (ref 3.4–5)
ALBUMIN/GLOB SERPL: 1.1 {RATIO} (ref 1–2)
ALP LIVER SERPL-CCNC: 55 U/L
ALT SERPL-CCNC: 24 U/L
ANION GAP SERPL CALC-SCNC: 9 MMOL/L (ref 0–18)
AST SERPL-CCNC: 20 U/L (ref 15–37)
BASOPHILS # BLD AUTO: 0.06 X10(3) UL (ref 0–0.2)
BASOPHILS NFR BLD AUTO: 0.7 %
BILIRUB SERPL-MCNC: 0.3 MG/DL (ref 0.1–2)
BUN BLD-MCNC: 24 MG/DL (ref 7–18)
CALCIUM BLD-MCNC: 10.1 MG/DL (ref 8.5–10.1)
CHLORIDE SERPL-SCNC: 103 MMOL/L (ref 98–112)
CO2 SERPL-SCNC: 26 MMOL/L (ref 21–32)
CREAT BLD-MCNC: 1.25 MG/DL
EOSINOPHIL # BLD AUTO: 0.32 X10(3) UL (ref 0–0.7)
EOSINOPHIL NFR BLD AUTO: 3.8 %
ERYTHROCYTE [DISTWIDTH] IN BLOOD BY AUTOMATED COUNT: 14 %
FASTING STATUS PATIENT QL REPORTED: NO
GLOBULIN PLAS-MCNC: 3.5 G/DL (ref 2.8–4.4)
GLUCOSE BLD-MCNC: 185 MG/DL (ref 70–99)
HCT VFR BLD AUTO: 34.6 %
HGB BLD-MCNC: 10.8 G/DL
IMM GRANULOCYTES # BLD AUTO: 0.07 X10(3) UL (ref 0–1)
IMM GRANULOCYTES NFR BLD: 0.8 %
LYMPHOCYTES # BLD AUTO: 3.1 X10(3) UL (ref 1–4)
LYMPHOCYTES NFR BLD AUTO: 37.3 %
MCH RBC QN AUTO: 26.7 PG (ref 26–34)
MCHC RBC AUTO-ENTMCNC: 31.2 G/DL (ref 31–37)
MCV RBC AUTO: 85.6 FL
MONOCYTES # BLD AUTO: 0.91 X10(3) UL (ref 0.1–1)
MONOCYTES NFR BLD AUTO: 10.9 %
NEUTROPHILS # BLD AUTO: 3.86 X10 (3) UL (ref 1.5–7.7)
NEUTROPHILS # BLD AUTO: 3.86 X10(3) UL (ref 1.5–7.7)
NEUTROPHILS NFR BLD AUTO: 46.5 %
OSMOLALITY SERPL CALC.SUM OF ELEC: 295 MOSM/KG (ref 275–295)
PLATELET # BLD AUTO: 345 10(3)UL (ref 150–450)
POTASSIUM SERPL-SCNC: 3.7 MMOL/L (ref 3.5–5.1)
PROT SERPL-MCNC: 7.5 G/DL (ref 6.4–8.2)
RBC # BLD AUTO: 4.04 X10(6)UL
SODIUM SERPL-SCNC: 138 MMOL/L (ref 136–145)
WBC # BLD AUTO: 8.3 X10(3) UL (ref 4–11)

## 2022-05-13 PROCEDURE — 99214 OFFICE O/P EST MOD 30 MIN: CPT | Performed by: INTERNAL MEDICINE

## 2022-05-13 PROCEDURE — 36415 COLL VENOUS BLD VENIPUNCTURE: CPT

## 2022-05-13 PROCEDURE — 85025 COMPLETE CBC W/AUTO DIFF WBC: CPT

## 2022-05-13 PROCEDURE — 80053 COMPREHEN METABOLIC PANEL: CPT

## 2022-05-13 NOTE — PATIENT INSTRUCTIONS
Devious x-ray shows that the patient has osteoarthritis of the left hip.   Pending physical therapy further recommendations once blood work is available

## 2022-05-16 ENCOUNTER — LAB ENCOUNTER (OUTPATIENT)
Dept: LAB | Age: 82
End: 2022-05-16
Attending: INTERNAL MEDICINE
Payer: MEDICARE

## 2022-05-16 DIAGNOSIS — R10.2 PELVIC PAIN IN FEMALE: ICD-10-CM

## 2022-05-16 LAB
BILIRUB UR QL STRIP.AUTO: NEGATIVE
CLARITY UR REFRACT.AUTO: CLEAR
COLOR UR AUTO: YELLOW
GLUCOSE UR STRIP.AUTO-MCNC: NEGATIVE MG/DL
KETONES UR STRIP.AUTO-MCNC: NEGATIVE MG/DL
NITRITE UR QL STRIP.AUTO: NEGATIVE
PH UR STRIP.AUTO: 6 [PH] (ref 5–8)
PROT UR STRIP.AUTO-MCNC: NEGATIVE MG/DL
RBC UR QL AUTO: NEGATIVE
SP GR UR STRIP.AUTO: 1.02 (ref 1–1.03)
UROBILINOGEN UR STRIP.AUTO-MCNC: <2 MG/DL

## 2022-05-16 PROCEDURE — 81001 URINALYSIS AUTO W/SCOPE: CPT

## 2022-05-17 ENCOUNTER — NURSE ONLY (OUTPATIENT)
Dept: INTERNAL MEDICINE CLINIC | Facility: CLINIC | Age: 82
End: 2022-05-17
Payer: MEDICARE

## 2022-05-17 PROCEDURE — 96372 THER/PROPH/DIAG INJ SC/IM: CPT | Performed by: INTERNAL MEDICINE

## 2022-05-17 RX ADMIN — CYANOCOBALAMIN 1000 MCG: 1000 INJECTION INTRAMUSCULAR; SUBCUTANEOUS at 10:23:00

## 2022-05-24 ENCOUNTER — TELEPHONE (OUTPATIENT)
Dept: INTERNAL MEDICINE CLINIC | Facility: CLINIC | Age: 82
End: 2022-05-24

## 2022-05-24 ENCOUNTER — NURSE ONLY (OUTPATIENT)
Dept: INTERNAL MEDICINE CLINIC | Facility: CLINIC | Age: 82
End: 2022-05-24
Payer: MEDICARE

## 2022-05-24 PROCEDURE — 96372 THER/PROPH/DIAG INJ SC/IM: CPT | Performed by: INTERNAL MEDICINE

## 2022-05-24 RX ADMIN — CYANOCOBALAMIN 1000 MCG: 1000 INJECTION INTRAMUSCULAR; SUBCUTANEOUS at 09:26:00

## 2022-05-27 DIAGNOSIS — I10 ESSENTIAL HYPERTENSION, BENIGN: ICD-10-CM

## 2022-05-27 RX ORDER — HYDRALAZINE HYDROCHLORIDE 25 MG/1
TABLET, FILM COATED ORAL
Qty: 90 TABLET | Refills: 0 | Status: SHIPPED | OUTPATIENT
Start: 2022-05-27

## 2022-06-09 NOTE — TELEPHONE ENCOUNTER
No Protocol     Requesting: diclofenac 50mg     LOV: 5/13/22   RTC: no follow ups on file   Filled: 5/6/22 #60 0 refills   Recent Labs: 4/7/22     Upcoming OV: none scheduled

## 2022-06-16 RX ORDER — FENOFIBRATE 160 MG/1
TABLET ORAL
Qty: 90 TABLET | Refills: 0 | Status: SHIPPED | OUTPATIENT
Start: 2022-06-16

## 2022-06-17 ENCOUNTER — HOSPITAL ENCOUNTER (OUTPATIENT)
Dept: CT IMAGING | Age: 82
Discharge: HOME | End: 2022-06-17
Attending: INTERNAL MEDICINE
Payer: MEDICARE

## 2022-06-17 DIAGNOSIS — R10.2 PELVIC PAIN IN FEMALE: ICD-10-CM

## 2022-06-17 PROCEDURE — 72193 CT PELVIS W/DYE: CPT | Performed by: INTERNAL MEDICINE

## 2022-06-28 ENCOUNTER — TELEPHONE (OUTPATIENT)
Dept: ORTHOPEDICS CLINIC | Facility: CLINIC | Age: 82
End: 2022-06-28

## 2022-06-28 DIAGNOSIS — I10 ESSENTIAL HYPERTENSION, BENIGN: ICD-10-CM

## 2022-06-28 RX ORDER — HYDRALAZINE HYDROCHLORIDE 25 MG/1
25 TABLET, FILM COATED ORAL 3 TIMES DAILY
Qty: 90 TABLET | Refills: 0 | Status: SHIPPED | OUTPATIENT
Start: 2022-06-28

## 2022-06-28 NOTE — TELEPHONE ENCOUNTER
LOV: 5/13/2022 with Dr. Paul Patient  RTC: no follow-up on file  Last Relevant Labs: April/May 2022  Filled: 5/27/2022    #90 with 0 refills    No future appointments.

## 2022-06-28 NOTE — TELEPHONE ENCOUNTER
Patient calling requesting appt for pain in the groin area.  Please advise if this is something you can see

## 2022-06-30 NOTE — TELEPHONE ENCOUNTER
Leonid Manzano MD   See initial telephone message - patient reporting groin pain. Groin pain absolutely can be due to the hip. Pelvic pain can be due to the hip as well. Anybody who sees hip arthritis can see her for this.  You can add her on with me, or with Jay Hunt if she is able to get in sooner

## 2022-07-08 ENCOUNTER — HOSPITAL ENCOUNTER (EMERGENCY)
Facility: HOSPITAL | Age: 82
Discharge: HOME | End: 2022-07-08
Attending: EMERGENCY MEDICINE
Payer: MEDICARE

## 2022-07-08 VITALS
HEIGHT: 63 IN | HEART RATE: 57 BPM | DIASTOLIC BLOOD PRESSURE: 53 MMHG | SYSTOLIC BLOOD PRESSURE: 185 MMHG | OXYGEN SATURATION: 99 % | TEMPERATURE: 98 F | WEIGHT: 179 LBS | BODY MASS INDEX: 31.71 KG/M2 | RESPIRATION RATE: 18 BRPM

## 2022-07-08 DIAGNOSIS — S76.219A GROIN STRAIN, UNSPECIFIED LATERALITY, INITIAL ENCOUNTER: Primary | ICD-10-CM

## 2022-07-08 PROCEDURE — 99283 EMERGENCY DEPT VISIT LOW MDM: CPT | Performed by: EMERGENCY MEDICINE

## 2022-07-08 NOTE — ED INITIAL ASSESSMENT (HPI)
Patient states she started having left groin pain about 6 months ago after picking her  off the floor. Since then she has had on and off pain, saw PCP who sent her for CT pelvis and then referred to a hip specialist- who family states \"did not want to see her. We are here today to see what is going on. \"

## 2022-07-28 DIAGNOSIS — I10 ESSENTIAL HYPERTENSION, BENIGN: ICD-10-CM

## 2022-07-28 RX ORDER — AMLODIPINE BESYLATE 5 MG/1
TABLET ORAL
Qty: 180 TABLET | Refills: 0 | Status: SHIPPED | OUTPATIENT
Start: 2022-07-28

## 2022-07-28 NOTE — TELEPHONE ENCOUNTER
LOV: 5/13/2022 with Dr. Lion Kidd  RTC: no follow-up on file  Last Relevant Labs: April/May 2022  Filled: 4/8/2022   #180 with 0 refills    Future Appointments   Date Time Provider Isiah Rodríguez   8/9/2022  9:00 AM Radha Tejada MD [facility] [id number]

## 2022-08-01 DIAGNOSIS — I10 ESSENTIAL HYPERTENSION, BENIGN: ICD-10-CM

## 2022-08-02 RX ORDER — HYDRALAZINE HYDROCHLORIDE 25 MG/1
TABLET, FILM COATED ORAL
Qty: 90 TABLET | Refills: 0 | Status: SHIPPED | OUTPATIENT
Start: 2022-08-02

## 2022-08-02 NOTE — TELEPHONE ENCOUNTER
[facility] Message: STAT    LOV: 5/13/2022 with Dr. Cobian Serum  RTC: no follow-up on file  Last Relevant Labs: April/May 2022  Filled: 6/28/2022    #90 with 0 refills    Future Appointments   Date Time Provider Isiah Rodríguez   8/9/2022  9:00 AM Nelida Heimlich, MD [facility] [id number]

## 2022-08-09 ENCOUNTER — OFFICE VISIT (OUTPATIENT)
Dept: ORTHOPEDICS CLINIC | Facility: CLINIC | Age: 82
End: 2022-08-09
Payer: MEDICARE

## 2022-08-09 VITALS — BODY MASS INDEX: 31.71 KG/M2 | WEIGHT: 179 LBS | HEIGHT: 63 IN

## 2022-08-09 DIAGNOSIS — M16.12 PRIMARY OSTEOARTHRITIS OF LEFT HIP: Primary | ICD-10-CM

## 2022-08-09 PROCEDURE — 1125F AMNT PAIN NOTED PAIN PRSNT: CPT | Performed by: ORTHOPAEDIC SURGERY

## 2022-08-09 PROCEDURE — 99213 OFFICE O/P EST LOW 20 MIN: CPT | Performed by: ORTHOPAEDIC SURGERY

## 2022-08-09 NOTE — PROGRESS NOTES
Patient is a 52-year-old white female here with complaints of left hip and groin pain. Patient's daughter is with her today. Patient states that she first noticed it when she was lifting her  who needed a fair amount of help with transfers. This has been going on now though for several years is gotten progressively worse. Patient has had x-rays of her hip performed and also has had a CT of her abdomen and pelvis. I did review these with her. These do show her to have significant degenerative changes of the left hip. Right hip has minimal degenerative changes. Patient's primary complaint is that of swelling of the left groin region. She states that she has pain predominantly at night when she sleeping. No radiating pain in the legs. Patient's exam shows her to have a short stride gait and a mildly antalgic gait. Passive range of motion of her left hip produces minimal to mild pain. She does have loss of motion of the hip. Internal rotations around 10 degrees external rotation around 30 degrees. Flexion is to about 90 to 100 degrees. Impression is that of degenerative arthritis of the left hip. Recommendation is to go ahead with a cortisone injection of the hip under fluoroscopy. I referred her to one of the radiologists or physiatrist's for this injection. Patient will follow-up with us if not getting better with this injection. Might consider an MRI scan of her hip though with the CT findings and x-ray findings I do not think they will add any more information to the diagnosis.

## 2022-08-12 DIAGNOSIS — I10 WHITE COAT SYNDROME WITH DIAGNOSIS OF HYPERTENSION: ICD-10-CM

## 2022-08-12 NOTE — TELEPHONE ENCOUNTER
Last refill sig states 1 tablet daily, Per LOV 5/3/22 states 2 tablets daily. Called pt to reverify how much of benazepril-hydrochlorothiazide 20-12.5 mg she is using daily. Benazepril-hydrochlorothiazide 20-12.5 mg  Filled 5-12-22  Qty 90  0 refills  No upcoming appt.    LOV 5-3-22

## 2022-08-16 RX ORDER — BENAZEPRIL HYDROCHLORIDE AND HYDROCHLOROTHIAZIDE 20; 12.5 MG/1; MG/1
1 TABLET ORAL DAILY
Qty: 90 TABLET | Refills: 0 | Status: SHIPPED | OUTPATIENT
Start: 2022-08-16

## 2022-09-02 ENCOUNTER — OFFICE VISIT (OUTPATIENT)
Dept: PHYSICAL MEDICINE AND REHAB | Facility: CLINIC | Age: 82
End: 2022-09-02
Payer: MEDICARE

## 2022-09-02 ENCOUNTER — TELEPHONE (OUTPATIENT)
Dept: NEUROLOGY | Facility: CLINIC | Age: 82
End: 2022-09-02

## 2022-09-02 VITALS
HEIGHT: 63 IN | DIASTOLIC BLOOD PRESSURE: 60 MMHG | WEIGHT: 180 LBS | SYSTOLIC BLOOD PRESSURE: 110 MMHG | BODY MASS INDEX: 31.89 KG/M2

## 2022-09-02 DIAGNOSIS — M16.12 PRIMARY OSTEOARTHRITIS OF LEFT HIP: Primary | ICD-10-CM

## 2022-09-02 NOTE — TELEPHONE ENCOUNTER
Per Medicare guidelines authorization is not required for  Left hip joint steroid injection under fluoroscopy cpt codes 33817, 28529. To be done at  [facility]. 15 minutes. Will inform Nursing.

## 2022-09-04 DIAGNOSIS — I10 ESSENTIAL HYPERTENSION, BENIGN: ICD-10-CM

## 2022-09-06 RX ORDER — HYDRALAZINE HYDROCHLORIDE 25 MG/1
TABLET, FILM COATED ORAL
Qty: 90 TABLET | Refills: 0 | OUTPATIENT
Start: 2022-09-06

## 2022-09-06 NOTE — TELEPHONE ENCOUNTER
Protocol passed     Requesting: hydralazine 25mg     LOV:5/13/22   RTC: no follow ups on file   Filled: 8/2/22 #90 0 refills   Recent Labs: 5/13/22     Upcoming OV: none scheduled

## 2022-09-07 NOTE — TELEPHONE ENCOUNTER
LMTCB to schedule injection     Procedure:  Left hip joint steroid injection under fluoroscopy  Anesthesia: LOCAL  Dx: Primary osteoarthritis of left hip (M16.12)  Location: [facility]  CPT Codes: 92986, 69081

## 2022-09-08 NOTE — TELEPHONE ENCOUNTER
Patient has been scheduled for Left hip joint steroid injection under fluoroscopy on 9/23/2022 at the  [facility] with Dr. Lona Caal . -Anesthesia type: LOCAL  -If scheduling [address] covid testing required for all procedures whether patient is vaccinated or not. -Patient informed not to eat or drink anything after midnight the night prior to the procedure, if being sedated. -Patient was advised that if he/she does receive the covid vaccine it needs to be at least 2 weeks before or after the injection. -Medications and allergies reviewed. -Patient reminded to hold NSAIDs (Ibuprofen, ASA 81, Aleve, Naproxen, Mobic, Diclofenac, Etodolac, Celebrex etc.) for 3 days prior to [place]  if BMI is greater than 35. For Cervical injections only hold multivitamins, Vitamin E, Fish Oil, Phentermine (Lomaira) for 7 days prior to injection and NSAIDS.  mg to be held for 7 days prior to injections.  -If patient is receiving MAC/IVCS Phentermine Maxene Andujar) will need to be held for 7 days prior to injection.  -If on blood thinner clearance has been received to hold this medication by provider.   -Patient informed he/she will need a  to and from procedure. -[facility] is located in the [facility] 1st floor. Patient may park in the yellow parking.     Patient verbalized understanding and agrees with plan.  -----> Scheduled in Epic: Yes  -----> Scheduled in Casetabs: N/A  Request faxed to [id number]: [phone]

## 2022-09-13 DIAGNOSIS — I10 ESSENTIAL HYPERTENSION, BENIGN: ICD-10-CM

## 2022-09-14 RX ORDER — AMLODIPINE BESYLATE 5 MG/1
TABLET ORAL
Qty: 180 TABLET | Refills: 0 | Status: SHIPPED | OUTPATIENT
Start: 2022-09-14

## 2022-09-14 RX ORDER — FENOFIBRATE 160 MG/1
TABLET ORAL
Qty: 90 TABLET | Refills: 0 | Status: SHIPPED | OUTPATIENT
Start: 2022-09-14

## 2022-09-15 DIAGNOSIS — I10 ESSENTIAL HYPERTENSION, BENIGN: ICD-10-CM

## 2022-09-16 RX ORDER — HYDRALAZINE HYDROCHLORIDE 25 MG/1
25 TABLET, FILM COATED ORAL 3 TIMES DAILY
Qty: 90 TABLET | Refills: 0 | Status: SHIPPED | OUTPATIENT
Start: 2022-09-16

## 2022-09-23 ENCOUNTER — OFFICE VISIT (OUTPATIENT)
Dept: SURGERY | Facility: CLINIC | Age: 82
End: 2022-09-23

## 2022-09-23 DIAGNOSIS — M16.12 PRIMARY OSTEOARTHRITIS OF LEFT HIP: Primary | ICD-10-CM

## 2022-09-23 PROCEDURE — 20610 DRAIN/INJ JOINT/BURSA W/O US: CPT | Performed by: PHYSICAL MEDICINE & REHABILITATION

## 2022-09-23 PROCEDURE — 77002 NEEDLE LOCALIZATION BY XRAY: CPT | Performed by: PHYSICAL MEDICINE & REHABILITATION

## 2022-09-24 NOTE — PROCEDURES
left intra-articular hip injection - [address] [facility]    Indication: left hip osteoarthritis    Description of procedure: After discussion of the risks and benefits of the procedure, informed consent was signed and the patient was marked to confirm the correct hip to be injected. The patient was then brought to the procedure room. The patient was positioned in the right sidelying position. The left lateral hip was prepped and draped in the usual sterile manner using chlorhexidine. Under fluoroscopy the head of the femur was identified in the lateral view. The skin and soft tissue was anesthetized with 1% lidocaine without preservative. A 22g 3.5 inch needle was then advanced towards the head of the left femur. Under AP view the needle was advanced to the junction of the neck and head of the left femur until the needle was felt to advance past the capsule. Under live fluroscopic imaging 0.5ml was injected and outlined the hip joint after negative aspiration for heme and air. No paresthesias were elicited. Following confirmation of proper placement of the needle with dye, 40mg of kenalog was injected with 4ml of 1% PF lidocaine for a total injectate of 5ml. The needle was then withdrawn. Complications: No immediate complications  Follow up: 4-6 weeks.     Samantha Dunn DO  Physical Medicine and [address]

## 2022-10-04 ENCOUNTER — OFFICE VISIT (OUTPATIENT)
Dept: INTERNAL MEDICINE CLINIC | Facility: CLINIC | Age: 82
End: 2022-10-04
Payer: MEDICARE

## 2022-10-04 VITALS
SYSTOLIC BLOOD PRESSURE: 160 MMHG | WEIGHT: 194.38 LBS | TEMPERATURE: 98 F | DIASTOLIC BLOOD PRESSURE: 68 MMHG | OXYGEN SATURATION: 99 % | BODY MASS INDEX: 34.44 KG/M2 | HEART RATE: 50 BPM | RESPIRATION RATE: 16 BRPM | HEIGHT: 63 IN

## 2022-10-04 DIAGNOSIS — E78.1 HYPERTRIGLYCERIDEMIA: ICD-10-CM

## 2022-10-04 DIAGNOSIS — I10 WHITE COAT SYNDROME WITH DIAGNOSIS OF HYPERTENSION: Primary | ICD-10-CM

## 2022-10-04 DIAGNOSIS — E53.8 B12 DEFICIENCY: ICD-10-CM

## 2022-10-04 DIAGNOSIS — Z00.00 LABORATORY EXAMINATION ORDERED AS PART OF A ROUTINE GENERAL MEDICAL EXAMINATION: ICD-10-CM

## 2022-10-04 DIAGNOSIS — D64.9 ANEMIA, UNSPECIFIED TYPE: ICD-10-CM

## 2022-10-04 DIAGNOSIS — R73.02 GLUCOSE INTOLERANCE (IMPAIRED GLUCOSE TOLERANCE): ICD-10-CM

## 2022-10-04 PROCEDURE — 99214 OFFICE O/P EST MOD 30 MIN: CPT | Performed by: INTERNAL MEDICINE

## 2022-10-04 RX ORDER — FERROUS SULFATE 137(45) MG
142 TABLET, EXTENDED RELEASE ORAL DAILY
Qty: 90 TABLET | Refills: 3 | Status: SHIPPED | OUTPATIENT
Start: 2022-10-04 | End: 2023-01-02

## 2022-10-04 RX ORDER — FOLIC ACID 1 MG/1
1 TABLET ORAL DAILY
Qty: 90 TABLET | Refills: 3 | Status: SHIPPED | OUTPATIENT
Start: 2022-10-04 | End: 2023-01-02

## 2022-10-04 RX ORDER — LOSARTAN POTASSIUM AND HYDROCHLOROTHIAZIDE 25; 100 MG/1; MG/1
1 TABLET ORAL DAILY
Qty: 90 TABLET | Refills: 3 | Status: SHIPPED | OUTPATIENT
Start: 2022-10-04 | End: 2023-09-29

## 2022-10-04 NOTE — PATIENT INSTRUCTIONS
BP meds adjusted.   Patient informed to get her blood work and stool cards 1 week prior to her office visit-complete physical

## 2022-10-11 ENCOUNTER — LAB ENCOUNTER (OUTPATIENT)
Dept: LAB | Age: 82
End: 2022-10-11
Attending: INTERNAL MEDICINE
Payer: MEDICARE

## 2022-10-11 DIAGNOSIS — R73.02 GLUCOSE INTOLERANCE (IMPAIRED GLUCOSE TOLERANCE): ICD-10-CM

## 2022-10-11 DIAGNOSIS — E78.1 HYPERTRIGLYCERIDEMIA: ICD-10-CM

## 2022-10-11 DIAGNOSIS — D64.9 ANEMIA, UNSPECIFIED TYPE: ICD-10-CM

## 2022-10-11 DIAGNOSIS — E53.8 B12 DEFICIENCY: ICD-10-CM

## 2022-10-11 DIAGNOSIS — Z00.00 LABORATORY EXAMINATION ORDERED AS PART OF A ROUTINE GENERAL MEDICAL EXAMINATION: ICD-10-CM

## 2022-10-11 LAB
ALBUMIN SERPL-MCNC: 3.7 G/DL (ref 3.4–5)
ALBUMIN/GLOB SERPL: 1.2 {RATIO} (ref 1–2)
ALP LIVER SERPL-CCNC: 35 U/L
ALT SERPL-CCNC: 25 U/L
ANION GAP SERPL CALC-SCNC: 4 MMOL/L (ref 0–18)
AST SERPL-CCNC: 21 U/L (ref 15–37)
BASOPHILS # BLD AUTO: 0.05 X10(3) UL (ref 0–0.2)
BASOPHILS NFR BLD AUTO: 0.8 %
BILIRUB SERPL-MCNC: 0.4 MG/DL (ref 0.1–2)
BILIRUB UR QL STRIP.AUTO: NEGATIVE
BUN BLD-MCNC: 27 MG/DL (ref 7–18)
CALCIUM BLD-MCNC: 9.8 MG/DL (ref 8.5–10.1)
CHLORIDE SERPL-SCNC: 108 MMOL/L (ref 98–112)
CHOLEST SERPL-MCNC: 191 MG/DL (ref ?–200)
CO2 SERPL-SCNC: 27 MMOL/L (ref 21–32)
COLOR UR AUTO: YELLOW
CREAT BLD-MCNC: 1.18 MG/DL
DEPRECATED HBV CORE AB SER IA-ACNC: 41.4 NG/ML
EOSINOPHIL # BLD AUTO: 0.3 X10(3) UL (ref 0–0.7)
EOSINOPHIL NFR BLD AUTO: 4.8 %
ERYTHROCYTE [DISTWIDTH] IN BLOOD BY AUTOMATED COUNT: 14.8 %
EST. AVERAGE GLUCOSE BLD GHB EST-MCNC: 120 MG/DL (ref 68–126)
FASTING PATIENT LIPID ANSWER: YES
FASTING STATUS PATIENT QL REPORTED: YES
FOLATE SERPL-MCNC: 35.1 NG/ML (ref 8.7–?)
GFR SERPLBLD BASED ON 1.73 SQ M-ARVRAT: 46 ML/MIN/1.73M2 (ref 60–?)
GLOBULIN PLAS-MCNC: 3.2 G/DL (ref 2.8–4.4)
GLUCOSE BLD-MCNC: 98 MG/DL (ref 70–99)
GLUCOSE UR STRIP.AUTO-MCNC: NEGATIVE MG/DL
HBA1C MFR BLD: 5.8 % (ref ?–5.7)
HCT VFR BLD AUTO: 34.4 %
HDLC SERPL-MCNC: 42 MG/DL (ref 40–59)
HEMOCCULT STL QL: NEGATIVE
HGB BLD-MCNC: 11 G/DL
IMM GRANULOCYTES # BLD AUTO: 0.02 X10(3) UL (ref 0–1)
IMM GRANULOCYTES NFR BLD: 0.3 %
IRON SATN MFR SERPL: 16 %
IRON SERPL-MCNC: 76 UG/DL
KETONES UR STRIP.AUTO-MCNC: NEGATIVE MG/DL
LDLC SERPL CALC-MCNC: 117 MG/DL (ref ?–100)
LYMPHOCYTES # BLD AUTO: 2.17 X10(3) UL (ref 1–4)
LYMPHOCYTES NFR BLD AUTO: 34.4 %
MCH RBC QN AUTO: 27.2 PG (ref 26–34)
MCHC RBC AUTO-ENTMCNC: 32 G/DL (ref 31–37)
MCV RBC AUTO: 85.1 FL
MONOCYTES # BLD AUTO: 0.74 X10(3) UL (ref 0.1–1)
MONOCYTES NFR BLD AUTO: 11.7 %
NEUTROPHILS # BLD AUTO: 3.03 X10 (3) UL (ref 1.5–7.7)
NEUTROPHILS # BLD AUTO: 3.03 X10(3) UL (ref 1.5–7.7)
NEUTROPHILS NFR BLD AUTO: 48 %
NITRITE UR QL STRIP.AUTO: NEGATIVE
NONHDLC SERPL-MCNC: 149 MG/DL (ref ?–130)
OSMOLALITY SERPL CALC.SUM OF ELEC: 293 MOSM/KG (ref 275–295)
PH UR STRIP.AUTO: 6 [PH] (ref 5–8)
PLATELET # BLD AUTO: 263 10(3)UL (ref 150–450)
POTASSIUM SERPL-SCNC: 4.1 MMOL/L (ref 3.5–5.1)
PROT SERPL-MCNC: 6.9 G/DL (ref 6.4–8.2)
PROT UR STRIP.AUTO-MCNC: NEGATIVE MG/DL
RBC # BLD AUTO: 4.04 X10(6)UL
RBC UR QL AUTO: NEGATIVE
SODIUM SERPL-SCNC: 139 MMOL/L (ref 136–145)
SP GR UR STRIP.AUTO: 1.02 (ref 1–1.03)
TIBC SERPL-MCNC: 480 UG/DL (ref 240–450)
TRANSFERRIN SERPL-MCNC: 322 MG/DL (ref 200–360)
TRIGL SERPL-MCNC: 180 MG/DL (ref 30–149)
TSI SER-ACNC: 1.43 MIU/ML (ref 0.36–3.74)
UROBILINOGEN UR STRIP.AUTO-MCNC: 4 MG/DL
VIT B12 SERPL-MCNC: 153 PG/ML (ref 193–986)
VLDLC SERPL CALC-MCNC: 32 MG/DL (ref 0–30)
WBC # BLD AUTO: 6.3 X10(3) UL (ref 4–11)
WBC #/AREA URNS AUTO: >50 /HPF
WBC CLUMPS UR QL AUTO: PRESENT /HPF

## 2022-10-11 PROCEDURE — 81001 URINALYSIS AUTO W/SCOPE: CPT

## 2022-10-11 PROCEDURE — 85025 COMPLETE CBC W/AUTO DIFF WBC: CPT

## 2022-10-11 PROCEDURE — 80061 LIPID PANEL: CPT

## 2022-10-11 PROCEDURE — 84443 ASSAY THYROID STIM HORMONE: CPT

## 2022-10-11 PROCEDURE — 82607 VITAMIN B-12: CPT

## 2022-10-11 PROCEDURE — 83550 IRON BINDING TEST: CPT

## 2022-10-11 PROCEDURE — 80053 COMPREHEN METABOLIC PANEL: CPT

## 2022-10-11 PROCEDURE — 82274 ASSAY TEST FOR BLOOD FECAL: CPT

## 2022-10-11 PROCEDURE — 36415 COLL VENOUS BLD VENIPUNCTURE: CPT

## 2022-10-11 PROCEDURE — 82746 ASSAY OF FOLIC ACID SERUM: CPT

## 2022-10-11 PROCEDURE — 83540 ASSAY OF IRON: CPT

## 2022-10-11 PROCEDURE — 83036 HEMOGLOBIN GLYCOSYLATED A1C: CPT

## 2022-10-11 PROCEDURE — 82728 ASSAY OF FERRITIN: CPT

## 2022-10-29 DIAGNOSIS — I10 ESSENTIAL HYPERTENSION, BENIGN: ICD-10-CM

## 2022-10-31 RX ORDER — AMLODIPINE BESYLATE 5 MG/1
TABLET ORAL
Qty: 180 TABLET | Refills: 0 | Status: SHIPPED | OUTPATIENT
Start: 2022-10-31

## 2022-10-31 NOTE — TELEPHONE ENCOUNTER
Protocol passed     Requesting: amlodipine 5mg     LOV: 10/4/22   RTC: 6 weeks   Filled: 9/14/22 #180 0 refills   Recent Labs: 10/11/22     Upcoming OV: 11/22/22

## 2022-11-02 ENCOUNTER — ORDER TRANSCRIPTION (OUTPATIENT)
Dept: ADMINISTRATIVE | Facility: HOSPITAL | Age: 82
End: 2022-11-02

## 2022-11-02 DIAGNOSIS — Z12.31 ENCOUNTER FOR SCREENING MAMMOGRAM FOR MALIGNANT NEOPLASM OF BREAST: Primary | ICD-10-CM

## 2022-11-09 ENCOUNTER — HOSPITAL ENCOUNTER (OUTPATIENT)
Dept: MAMMOGRAPHY | Age: 82
Discharge: HOME | End: 2022-11-09
Attending: INTERNAL MEDICINE
Payer: MEDICARE

## 2022-11-09 ENCOUNTER — OFFICE VISIT (OUTPATIENT)
Dept: PHYSICAL MEDICINE AND REHAB | Facility: CLINIC | Age: 82
End: 2022-11-09
Payer: MEDICARE

## 2022-11-09 ENCOUNTER — TELEPHONE (OUTPATIENT)
Dept: NEUROLOGY | Facility: CLINIC | Age: 82
End: 2022-11-09

## 2022-11-09 VITALS
HEIGHT: 63 IN | WEIGHT: 195 LBS | SYSTOLIC BLOOD PRESSURE: 126 MMHG | OXYGEN SATURATION: 97 % | HEART RATE: 60 BPM | BODY MASS INDEX: 34.55 KG/M2 | DIASTOLIC BLOOD PRESSURE: 74 MMHG

## 2022-11-09 DIAGNOSIS — Z12.31 ENCOUNTER FOR SCREENING MAMMOGRAM FOR MALIGNANT NEOPLASM OF BREAST: ICD-10-CM

## 2022-11-09 DIAGNOSIS — M54.16 LEFT LUMBAR RADICULITIS: Primary | ICD-10-CM

## 2022-11-09 PROCEDURE — 77067 SCR MAMMO BI INCL CAD: CPT | Performed by: INTERNAL MEDICINE

## 2022-11-09 PROCEDURE — 99214 OFFICE O/P EST MOD 30 MIN: CPT | Performed by: PHYSICAL MEDICINE & REHABILITATION

## 2022-11-09 PROCEDURE — 77063 BREAST TOMOSYNTHESIS BI: CPT | Performed by: INTERNAL MEDICINE

## 2022-11-09 NOTE — PATIENT INSTRUCTIONS
If no better after the back injections let me know, I will order an MRI of your left hip, and then you follow up with orthopedic surgeon Dr. Edel Pemberton.

## 2022-11-09 NOTE — TELEPHONE ENCOUNTER
Per Medicare guidelines authorization is not required for Left L3 and L4 transforaminal epidural steroid injection under fluoroscopy cpt codes  07776-MP, 78183-ZW, 11131. Will inform Nursing.

## 2022-11-11 NOTE — TELEPHONE ENCOUNTER
Voicemail full- 1st attempt    Procedure: Left L3 + L4 transforaminal epidural steroid injection  Anesthesia: Local  Dx: M54.16  Location: [facility]  CPT Codes: [id number], [id number], 92698

## 2022-11-14 ENCOUNTER — OFFICE VISIT (OUTPATIENT)
Dept: ORTHOPEDICS CLINIC | Facility: CLINIC | Age: 82
End: 2022-11-14
Payer: MEDICARE

## 2022-11-14 DIAGNOSIS — M75.01 ADHESIVE CAPSULITIS OF RIGHT SHOULDER: Primary | ICD-10-CM

## 2022-11-14 DIAGNOSIS — M19.011 PRIMARY OSTEOARTHRITIS OF RIGHT SHOULDER: ICD-10-CM

## 2022-11-14 DIAGNOSIS — S46.001A INJURY OF RIGHT ROTATOR CUFF, INITIAL ENCOUNTER: ICD-10-CM

## 2022-11-14 RX ORDER — KETOROLAC TROMETHAMINE 30 MG/ML
30 INJECTION, SOLUTION INTRAMUSCULAR; INTRAVENOUS ONCE
Status: COMPLETED | OUTPATIENT
Start: 2022-11-14 | End: 2022-11-14

## 2022-11-14 RX ORDER — TRIAMCINOLONE ACETONIDE 40 MG/ML
40 INJECTION, SUSPENSION INTRA-ARTICULAR; INTRAMUSCULAR ONCE
Status: COMPLETED | OUTPATIENT
Start: 2022-11-14 | End: 2022-11-14

## 2022-11-14 RX ADMIN — KETOROLAC TROMETHAMINE 30 MG: 30 INJECTION, SOLUTION INTRAMUSCULAR; INTRAVENOUS at 10:29:00

## 2022-11-14 RX ADMIN — TRIAMCINOLONE ACETONIDE 40 MG: 40 INJECTION, SUSPENSION INTRA-ARTICULAR; INTRAMUSCULAR at 10:29:00

## 2022-11-14 NOTE — PROCEDURES
Right Shoulder Glenohumeral Joint Injection    Name: Frieda Pinto   MRN: [id number]  Date: 11/14/2022     Clinical Indications:   Persistent Shoulder pain refractory to conservative measures. After informed consent, the injection site was marked, sterilized with topical chlorhexidine antiseptic, and locally anesthetized with skin refrigerant. The patient was seated upright and the shoulder was exposed. Using sterile technique: 1 mL of 30mg/mL of Ketorolac, 2 mL of 0.5% Bupivicaine, 2 mL of 1% Lidocaine, and 1 mL of 40 mg/ml Triamcinolonewas injected with a Anterior approach utilizing a 21 gauge needle. A band-aid was applied. The patient tolerated the procedure well. Disposition:   Return to clinic on an as needed basis. MADAN Osei, PABalwinderC Orthopedic Surgery / Sports Medicine Specialist  [facility] Orthopaedic Surgery  [address]   [facility]. [facility]  [email or web address]  t: [phone]  o: [phone]  f: [phone]          This note was dictated using Dragon software. While it was briefly proofread prior to completion, some grammatical, spelling, and word choice errors due to dictation may still occur.

## 2022-11-14 NOTE — TELEPHONE ENCOUNTER
Patient has been scheduled for Left L3 + L4 transforaminal epidural steroid injection on 12/16/22 at the [facility]  with Dr. Savanah Arrieta. -Anesthesia type: Local.  -Patient informed to fast 12 hours prior to procedure with IVCS/MAC.   -Scheduling [facility] covid testing required for all procedures whether patient is vaccinated or not. -Patient was advised that if he/she does receive the covid vaccine it needs to be at least 2 weeks before or after the injection. -Medications and allergies reviewed. -Patient reminded to hold NSAIDs (Ibuprofen, ASA 81, Aleve, Naproxen, Mobic, Diclofenac, Etodolac, Celebrex etc.) for 3 days prior to Lumbar MBB/Facet if BMI is greater than 35. For Cervical injections only hold multivitamins, Vitamin E, Fish Oil, Phentermine/Lomaira for 7 days prior to injection and NSAIDS.  mg to be held for 7 days prior to injections.  -If patient is receiving MAC/IVCS Phentermine Tracie Brendan) will need to be held for 7 days prior to injection.  -If on blood thinner clearance has been received to hold this medication by provider.   -Patient informed he/she will need a  to and from procedure. [facility] is located in the [facility] [address] 1st floor,  may park in the yellow/purple parking lot.     Patient verbalized understanding and agrees with plan.  -----> Scheduled in Epic: Yes  -----> Scheduled in Casetabs: No  Scheduling for [facility]:    [x] H&P  [x] Insurance information    [x] Facesheet  [x] [facility] scheduling form

## 2022-11-15 DIAGNOSIS — I10 ESSENTIAL HYPERTENSION, BENIGN: ICD-10-CM

## 2022-11-15 RX ORDER — HYDRALAZINE HYDROCHLORIDE 25 MG/1
TABLET, FILM COATED ORAL
Qty: 90 TABLET | Refills: 0 | Status: SHIPPED | OUTPATIENT
Start: 2022-11-15

## 2022-11-18 NOTE — TELEPHONE ENCOUNTER
LOV: 10/4/2022 with Dr. Jayjay Looney  RTC: 6 weeks  Last Relevant Labs: 10/11/2022  Filled: 10/21/2022   #60 with 0 refills    Future Appointments   Date Time Provider Isiah Rodríguez   11/22/2022  9:30 AM Óscar June MD [facility] [place]   12/16/2022  2:00 PM DO Karlo Farr [address] [facility]

## 2022-11-22 ENCOUNTER — OFFICE VISIT (OUTPATIENT)
Dept: INTERNAL MEDICINE CLINIC | Facility: CLINIC | Age: 82
End: 2022-11-22
Payer: MEDICARE

## 2022-11-22 VITALS
WEIGHT: 188.81 LBS | BODY MASS INDEX: 35.65 KG/M2 | OXYGEN SATURATION: 100 % | RESPIRATION RATE: 16 BRPM | DIASTOLIC BLOOD PRESSURE: 78 MMHG | SYSTOLIC BLOOD PRESSURE: 136 MMHG | HEIGHT: 61 IN | HEART RATE: 52 BPM | TEMPERATURE: 98 F

## 2022-11-22 DIAGNOSIS — Z00.00 ROUTINE GENERAL MEDICAL EXAMINATION AT A HEALTH CARE FACILITY: Primary | ICD-10-CM

## 2022-11-22 DIAGNOSIS — E78.1 HYPERTRIGLYCERIDEMIA: Chronic | ICD-10-CM

## 2022-11-22 DIAGNOSIS — E53.8 B12 DEFICIENCY: ICD-10-CM

## 2022-11-22 DIAGNOSIS — D64.9 ANEMIA, UNSPECIFIED TYPE: ICD-10-CM

## 2022-11-22 DIAGNOSIS — M16.12 PRIMARY OSTEOARTHRITIS OF LEFT HIP: ICD-10-CM

## 2022-11-22 DIAGNOSIS — I10 WHITE COAT SYNDROME WITH DIAGNOSIS OF HYPERTENSION: Chronic | ICD-10-CM

## 2022-11-22 PROBLEM — M48.061 SPINAL STENOSIS OF LUMBAR REGION WITHOUT NEUROGENIC CLAUDICATION: Status: ACTIVE | Noted: 2022-11-22

## 2022-11-22 PROBLEM — M47.816 SPONDYLOSIS OF LUMBAR REGION WITHOUT MYELOPATHY OR RADICULOPATHY: Status: ACTIVE | Noted: 2022-11-22

## 2022-11-22 LAB
ATRIAL RATE: 48 BPM
P AXIS: -10 DEGREES
P-R INTERVAL: 164 MS
Q-T INTERVAL: 434 MS
QRS DURATION: 106 MS
QTC CALCULATION (BEZET): 387 MS
R AXIS: -42 DEGREES
T AXIS: 51 DEGREES
VENTRICULAR RATE: 48 BPM

## 2022-11-22 NOTE — PATIENT INSTRUCTIONS
15 minutes was spent reviewing the patient's records with respect to the consults. Blood work to be repeated in February.

## 2022-12-16 ENCOUNTER — OFFICE VISIT (OUTPATIENT)
Dept: SURGERY | Facility: CLINIC | Age: 82
End: 2022-12-16
Payer: MEDICARE

## 2022-12-16 DIAGNOSIS — M48.061 LUMBAR STENOSIS WITHOUT NEUROGENIC CLAUDICATION: Primary | ICD-10-CM

## 2022-12-16 DIAGNOSIS — M54.16 LEFT LUMBAR RADICULITIS: ICD-10-CM

## 2022-12-16 DIAGNOSIS — I10 ESSENTIAL HYPERTENSION, BENIGN: ICD-10-CM

## 2022-12-16 PROCEDURE — 64484 NJX AA&/STRD TFRM EPI L/S EA: CPT | Performed by: PHYSICAL MEDICINE & REHABILITATION

## 2022-12-16 PROCEDURE — 64483 NJX AA&/STRD TFRM EPI L/S 1: CPT | Performed by: PHYSICAL MEDICINE & REHABILITATION

## 2022-12-16 RX ORDER — HYDRALAZINE HYDROCHLORIDE 25 MG/1
TABLET, FILM COATED ORAL
Qty: 90 TABLET | Refills: 0 | Status: SHIPPED | OUTPATIENT
Start: 2022-12-16

## 2022-12-16 RX ORDER — FENOFIBRATE 160 MG/1
TABLET ORAL
Qty: 90 TABLET | Refills: 0 | Status: SHIPPED | OUTPATIENT
Start: 2022-12-16

## 2022-12-16 NOTE — TELEPHONE ENCOUNTER
Fenofibrate 160 mg  Filled 9-14-22  Qty 90  0 refills  No upcoming appt. LOV 11-22-22 VM  Labs: 10-11-22 Lipid  Repeat labs feb.  [address]

## 2022-12-16 NOTE — PROCEDURES
[address] [facility]    2-LEVEL LUMBAR TRANSFORAMINAL   NAME:  Tj Delacruz    MR #:    [id number] :  3/4/1940     PHYSICIAN:  Walter Wilkerson DO        Operative Report    DATE OF PROCEDURE: 2022   PREOPERATIVE DIAGNOSES: 1. Lumbar stenosis without neurogenic claudication    2. Left lumbar radiculitis        POSTOPERATIVE DIAGNOSES:   1. Lumbar stenosis without neurogenic claudication    2. Left lumbar radiculitis        PROCEDURES: Left L3 and L4 transforaminal epidural steroid injections done under fluoroscopic guidance with contrast enhancement. SURGEON: Walter Wilkerson DO   ANESTHESIA: Local   INDICATIONS:      OPERATIVE PROCEDURE:  Written consent was obtained from the patient. The patient was brought into the operating room and placed in the prone position on the fluoroscopy table with pillow underneath the abdomen. The patient's skin was cleaned and draped in a normal sterile fashion. Using AP fluoroscopy, all five lumbar vertebrae were identified. When the third and fourth vertebrae were identified, fluoroscopy was right anterior obliqued opening up the left L3-4 and left L4-5 intervertebral foramen. At this point in time, each site was anesthetized with 1% PF lidocaine without epinephrine. Then, 5 inch, 22-gauge spinal needles were inserted and directed towards the left L3-4 and left L4-5 intervertebral foramen. When they felt to be in good position, AP fluoroscopy was used to advance the needles to the 6 o'clock position on the left L3 and left L4 pedicles. At this point in time, Omnipaque-240 contrast was used to obtain a good epidurogram indicating correct needle placement at each level. Then, aspiration was performed. No blood, fluid, or air was aspirated. Then, the patient was injected with a 3 cc solution of 1.5 cc of 6 mg/cc of celestone and 1.5 cc of 1% PF lidocaine without epinephrine at each site. After this, the needles were removed. Each site was cleaned.   Band-Aids were applied. The patient was transferred to the cart and into [facility]. The patient was given discharge instructions and will follow up in the clinic as scheduled. Throughout the whole procedure, the patient's pulse oximetry and vital signs were monitored and they remained completely stable. Also, throughout the whole procedure, prior to injection of any medication, aspiration was performed. No blood, fluid, or air was aspirated at anytime.     Tiffany VogtGarfield Memorial Hospital  Physical Medicine and [address]

## 2023-01-07 ENCOUNTER — MED REC SCAN ONLY (OUTPATIENT)
Dept: ORTHOPEDICS CLINIC | Facility: CLINIC | Age: 83
End: 2023-01-07

## 2023-01-13 ENCOUNTER — OFFICE VISIT (OUTPATIENT)
Dept: PHYSICAL MEDICINE AND REHAB | Facility: CLINIC | Age: 83
End: 2023-01-13
Payer: MEDICARE

## 2023-01-13 VITALS
WEIGHT: 190 LBS | HEIGHT: 61 IN | BODY MASS INDEX: 35.87 KG/M2 | OXYGEN SATURATION: 97 % | SYSTOLIC BLOOD PRESSURE: 165 MMHG | DIASTOLIC BLOOD PRESSURE: 83 MMHG | HEART RATE: 50 BPM

## 2023-01-13 DIAGNOSIS — M54.16 LEFT LUMBAR RADICULITIS: Primary | ICD-10-CM

## 2023-01-13 DIAGNOSIS — M48.062 LUMBAR STENOSIS WITH NEUROGENIC CLAUDICATION: ICD-10-CM

## 2023-01-13 PROCEDURE — 99213 OFFICE O/P EST LOW 20 MIN: CPT | Performed by: PHYSICAL MEDICINE & REHABILITATION

## 2023-01-13 RX ORDER — PREGABALIN 50 MG/1
CAPSULE ORAL
Qty: 60 CAPSULE | Refills: 0 | Status: SHIPPED | OUTPATIENT
Start: 2023-01-13

## 2023-01-13 NOTE — PATIENT INSTRUCTIONS
Take 1 capsule at night time for 4 nights. If no side effects and no benefit after 4 nights increase the dose to once in the morning and once at night time. Come see me again in 6 weeks; make appointment for Fam Romero and we'll turn it over to Mio once I am back in [facility]

## 2023-01-17 DIAGNOSIS — I10 ESSENTIAL HYPERTENSION, BENIGN: ICD-10-CM

## 2023-01-17 RX ORDER — HYDRALAZINE HYDROCHLORIDE 25 MG/1
TABLET, FILM COATED ORAL
Qty: 90 TABLET | Refills: 0 | Status: SHIPPED | OUTPATIENT
Start: 2023-01-17

## 2023-01-23 ENCOUNTER — MED REC SCAN ONLY (OUTPATIENT)
Dept: PHYSICAL MEDICINE AND REHAB | Facility: CLINIC | Age: 83
End: 2023-01-23

## 2023-01-30 ENCOUNTER — HOSPITAL ENCOUNTER (EMERGENCY)
Facility: HOSPITAL | Age: 83
Discharge: HOME | End: 2023-01-30
Attending: EMERGENCY MEDICINE
Payer: MEDICARE

## 2023-01-30 ENCOUNTER — TELEPHONE (OUTPATIENT)
Dept: NEUROLOGY | Facility: CLINIC | Age: 83
End: 2023-01-30

## 2023-01-30 ENCOUNTER — APPOINTMENT (OUTPATIENT)
Dept: GENERAL RADIOLOGY | Facility: HOSPITAL | Age: 83
End: 2023-01-30
Attending: EMERGENCY MEDICINE
Payer: MEDICARE

## 2023-01-30 VITALS
SYSTOLIC BLOOD PRESSURE: 177 MMHG | TEMPERATURE: 98 F | HEART RATE: 63 BPM | DIASTOLIC BLOOD PRESSURE: 66 MMHG | RESPIRATION RATE: 20 BRPM | OXYGEN SATURATION: 98 %

## 2023-01-30 DIAGNOSIS — M54.40 BACK PAIN OF LUMBAR REGION WITH SCIATICA: Primary | ICD-10-CM

## 2023-01-30 PROCEDURE — 99283 EMERGENCY DEPT VISIT LOW MDM: CPT

## 2023-01-30 PROCEDURE — 99285 EMERGENCY DEPT VISIT HI MDM: CPT

## 2023-01-30 PROCEDURE — 72110 X-RAY EXAM L-2 SPINE 4/>VWS: CPT | Performed by: EMERGENCY MEDICINE

## 2023-01-30 RX ORDER — HYDROCODONE BITARTRATE AND ACETAMINOPHEN 5; 325 MG/1; MG/1
1 TABLET ORAL EVERY 8 HOURS PRN
Qty: 10 TABLET | Refills: 0 | Status: SHIPPED | OUTPATIENT
Start: 2023-01-30 | End: 2023-02-06

## 2023-01-30 RX ORDER — PREDNISONE 20 MG/1
40 TABLET ORAL DAILY
Qty: 6 TABLET | Refills: 0 | Status: SHIPPED | OUTPATIENT
Start: 2023-01-31 | End: 2023-02-06

## 2023-01-30 RX ORDER — DIAZEPAM 2 MG/1
2 TABLET ORAL ONCE
Status: COMPLETED | OUTPATIENT
Start: 2023-01-30 | End: 2023-01-30

## 2023-01-30 RX ORDER — DIAZEPAM 2 MG/1
2 TABLET ORAL EVERY 12 HOURS PRN
Qty: 12 TABLET | Refills: 0 | Status: SHIPPED | OUTPATIENT
Start: 2023-01-30 | End: 2023-02-06

## 2023-01-30 RX ORDER — PREDNISONE 20 MG/1
40 TABLET ORAL ONCE
Status: COMPLETED | OUTPATIENT
Start: 2023-01-30 | End: 2023-01-30

## 2023-01-30 RX ORDER — HYDROCODONE BITARTRATE AND ACETAMINOPHEN 5; 325 MG/1; MG/1
1 TABLET ORAL ONCE
Status: COMPLETED | OUTPATIENT
Start: 2023-01-30 | End: 2023-01-30

## 2023-01-30 NOTE — TELEPHONE ENCOUNTER
Patient calling to provide update after injection 2/3 weeks ago, she is in a lot of pain, Medication that was prescribed by [facility] for the pain is not working at all, she started PT last week on 1/23/22 for 3 days and now she is in a walker because she can't walk, calling to speak to a nurse or the on call Doctor for advice.

## 2023-01-30 NOTE — TELEPHONE ENCOUNTER
Per : \"It sounds like she will need to have bilateral L3 TFESI's. If she is concerned about having these, then she will need to be seen. If Dr. Savanah Arrieta is not back for a few weeks, then I will see her. Otherwise, she can wait until he returns. I would like for her try PT at least one more time this week. She can increase the Lyrica to TID. \"

## 2023-01-30 NOTE — TELEPHONE ENCOUNTER
Spoke with patient and reviewed message below. Patient stated she definitely would not be able to wait until Lb Sierra returns. She also stated she cannot do any more PT since she can't even walk. Informed patient if she can't even walk then she should be evaluated in the ER to make sure nothing else is going on. Patient understood and stated she might wait to go to the ER on Wednesday when her daughter can bring her. Advised patient she may not want to wait that long and to go as soon as she can, especially if any of her symptoms worsen further. Patient understood and will also try increasing the Lyrica. Advised patient to keep our office updated. Patient understood and was thankful. Update relayed to [address]

## 2023-01-30 NOTE — ED INITIAL ASSESSMENT (HPI)
A&Ox3 patient p/w back pain    Patient states \"been picking up my  after falling 15 times over the summer and pulled something in my back\"    Reports worsening pain for the past 3 days    Lumbar back pain radiating down BLE    Denies any loss of bowel/bladder function    RR even/NL

## 2023-01-30 NOTE — TELEPHONE ENCOUNTER
Patient of Dian Saba. LOV: 1/13/23  NOV: 2/23/23  Last injection: 12/16/22-Left L3 and L4 transforaminal epidural steroid injections                          9/23/22-left intra-articular hip injection - [address] [facility] per  at [facility] 1/13/23: \"Assessment and plan  1. Lumbar stenosis  2. Left lumbar radiculitis  Recommend physical therapy for neutral to flexion lumbar stabilization, pelvic tilts. Based on the results following left hip joint injection, and then left L3 and L4 TFESI I suspect this is coming from the lumbar spine and not the left hip, as I would have expected some relief, even for a few minutes, after lidocaine + steroid injection. I also recommend she start pregabalin 50mg at bedtime x4 nights, then 50mg BID if no side effects and no benefit with night time dosing only. Follow up in 6-8 weeks. If no improvement with PT + pregabalin consider repeating left TFESI in the future. \"    Spoke with patient who stated she has had no improvement with the Pregabalin. States she has been taking the pregabalin twice a day and it does nothing for her pain. She is also taking OTC tylenol, but this is not helping either. She also started PT last week, but this made her worse. States she is now having to walk with a walker as both of her legs feel weak. Pain is not in her low back, but it is in bilateral buttocks and thighs. States pain is in both sides equally. No c/o N/T. Pain is 7/10, but can get up to 10/10 while standing for too long. No c/o urinary issues, but has been having some constipation. Update forwarded to Linwood Dunne who is the covering provider this week, while Dian Saba is out.

## 2023-01-30 NOTE — TELEPHONE ENCOUNTER
Attempted to review below with patient, but there was no answer and VM box was full. Per Brionna Douglass she can go to the ER or \"She can come here if she would prefer. \"

## 2023-02-06 ENCOUNTER — TELEPHONE (OUTPATIENT)
Dept: PHYSICAL MEDICINE AND REHAB | Facility: CLINIC | Age: 83
End: 2023-02-06

## 2023-02-06 RX ORDER — HYDROCODONE BITARTRATE AND ACETAMINOPHEN 5; 325 MG/1; MG/1
1 TABLET ORAL EVERY 8 HOURS PRN
Qty: 15 TABLET | Refills: 0 | Status: SHIPPED | OUTPATIENT
Start: 2023-02-06 | End: 2023-02-11

## 2023-02-06 RX ORDER — DIAZEPAM 2 MG/1
2 TABLET ORAL EVERY 12 HOURS PRN
Qty: 15 TABLET | Refills: 0 | Status: SHIPPED | OUTPATIENT
Start: 2023-02-06 | End: 2023-02-13

## 2023-02-06 RX ORDER — PREDNISONE 20 MG/1
20 TABLET ORAL DAILY
Qty: 4 TABLET | Refills: 0 | Status: SHIPPED | OUTPATIENT
Start: 2023-02-06

## 2023-02-06 NOTE — TELEPHONE ENCOUNTER
Spoke with patient who stated she was doing better, but now is back to feeling the same since she ran out of the medication that were prescribed by the ER. States her butt still hurts on both sides and she is back to using a walker so she doesn't fall. Patient also has a follow up appointment with her Primary doctor on 2/9/23. NOV with Sania Coker scheduled for 2/23/23. Patient was given the following rx's in the ER:  -Norco 5-325 mg 1 tab q8h prn #10  -Diazepam 2 mg 1 tab q12 hr prn #12  -Prednisone 20 mg- 2 tabs daily for 3 days #6    Update forwarded on to Taylor Diehl to advise and see if patient could get refills on any of the above medications.

## 2023-02-06 NOTE — TELEPHONE ENCOUNTER
Spoke with Rosina Martinez and reviewed dx from last office visit. Also reviewed patient has tolerated the Diazepam and this is not meant for long term. Nothing further needed at this time.

## 2023-02-06 NOTE — TELEPHONE ENCOUNTER
Need dx for HYDROcodone-acetaminophen 5-325 MG Oral Tab-  Interaction with diazePAM 2 MG Oral Tab- please call to advise.

## 2023-02-06 NOTE — TELEPHONE ENCOUNTER
Unable to leave a message as VM box is full. Will need to reach back out to patient to let her know medication refills have been sent to her pharmacy.

## 2023-02-08 NOTE — TELEPHONE ENCOUNTER
Spoke with patient who confirmed that she did  the medication from her pharmacy and the medications are helping. Patient will call the office back if any of her symptoms worsen prior to her follow up appointment. Nothing further needed at this time.

## 2023-02-09 ENCOUNTER — OFFICE VISIT (OUTPATIENT)
Dept: INTERNAL MEDICINE CLINIC | Facility: CLINIC | Age: 83
End: 2023-02-09
Payer: MEDICARE

## 2023-02-09 ENCOUNTER — LAB ENCOUNTER (OUTPATIENT)
Dept: LAB | Age: 83
End: 2023-02-09
Attending: INTERNAL MEDICINE
Payer: MEDICARE

## 2023-02-09 VITALS
HEIGHT: 61 IN | WEIGHT: 191.81 LBS | BODY MASS INDEX: 36.21 KG/M2 | DIASTOLIC BLOOD PRESSURE: 77 MMHG | OXYGEN SATURATION: 98 % | HEART RATE: 64 BPM | TEMPERATURE: 98 F | RESPIRATION RATE: 16 BRPM | SYSTOLIC BLOOD PRESSURE: 165 MMHG

## 2023-02-09 DIAGNOSIS — R10.30 LOWER ABDOMINAL PAIN: Primary | ICD-10-CM

## 2023-02-09 DIAGNOSIS — D64.9 ANEMIA, UNSPECIFIED TYPE: ICD-10-CM

## 2023-02-09 DIAGNOSIS — I10 WHITE COAT SYNDROME WITH DIAGNOSIS OF HYPERTENSION: ICD-10-CM

## 2023-02-09 DIAGNOSIS — R10.30 LOWER ABDOMINAL PAIN: ICD-10-CM

## 2023-02-09 DIAGNOSIS — M48.061 SPINAL STENOSIS OF LUMBAR REGION WITHOUT NEUROGENIC CLAUDICATION: ICD-10-CM

## 2023-02-09 DIAGNOSIS — I10 WHITE COAT SYNDROME WITH DIAGNOSIS OF HYPERTENSION: Chronic | ICD-10-CM

## 2023-02-09 DIAGNOSIS — E78.1 HYPERTRIGLYCERIDEMIA: Chronic | ICD-10-CM

## 2023-02-09 DIAGNOSIS — E53.8 B12 DEFICIENCY: ICD-10-CM

## 2023-02-09 DIAGNOSIS — R60.0 FLUID RETENTION IN LEGS: ICD-10-CM

## 2023-02-09 LAB
ALBUMIN SERPL-MCNC: 3.7 G/DL (ref 3.4–5)
ALBUMIN/GLOB SERPL: 1.1 {RATIO} (ref 1–2)
ALP LIVER SERPL-CCNC: 44 U/L
ALT SERPL-CCNC: 23 U/L
ANION GAP SERPL CALC-SCNC: 8 MMOL/L (ref 0–18)
AST SERPL-CCNC: 22 U/L (ref 15–37)
BASOPHILS # BLD AUTO: 0.04 X10(3) UL (ref 0–0.2)
BASOPHILS NFR BLD AUTO: 0.4 %
BILIRUB SERPL-MCNC: 0.3 MG/DL (ref 0.1–2)
BUN BLD-MCNC: 38 MG/DL (ref 7–18)
CALCIUM BLD-MCNC: 10.4 MG/DL (ref 8.5–10.1)
CHLORIDE SERPL-SCNC: 106 MMOL/L (ref 98–112)
CHOLEST SERPL-MCNC: 174 MG/DL (ref ?–200)
CO2 SERPL-SCNC: 28 MMOL/L (ref 21–32)
CREAT BLD-MCNC: 1.42 MG/DL
EOSINOPHIL # BLD AUTO: 0.17 X10(3) UL (ref 0–0.7)
EOSINOPHIL NFR BLD AUTO: 1.7 %
ERYTHROCYTE [DISTWIDTH] IN BLOOD BY AUTOMATED COUNT: 14.2 %
FASTING PATIENT LIPID ANSWER: NO
FASTING STATUS PATIENT QL REPORTED: NO
FOLATE SERPL-MCNC: 55.1 NG/ML (ref 8.7–?)
GFR SERPLBLD BASED ON 1.73 SQ M-ARVRAT: 37 ML/MIN/1.73M2 (ref 60–?)
GLOBULIN PLAS-MCNC: 3.3 G/DL (ref 2.8–4.4)
GLUCOSE BLD-MCNC: 84 MG/DL (ref 70–99)
HCT VFR BLD AUTO: 34.8 %
HDLC SERPL-MCNC: 43 MG/DL (ref 40–59)
HGB BLD-MCNC: 10.9 G/DL
IMM GRANULOCYTES # BLD AUTO: 0.09 X10(3) UL (ref 0–1)
IMM GRANULOCYTES NFR BLD: 0.9 %
LDLC SERPL CALC-MCNC: 84 MG/DL (ref ?–100)
LIPASE SERPL-CCNC: 303 U/L (ref 73–393)
LIPASE SERPL-CCNC: 80 U/L (ref 13–75)
LYMPHOCYTES # BLD AUTO: 3.12 X10(3) UL (ref 1–4)
LYMPHOCYTES NFR BLD AUTO: 31.3 %
MCH RBC QN AUTO: 27.7 PG (ref 26–34)
MCHC RBC AUTO-ENTMCNC: 31.3 G/DL (ref 31–37)
MCV RBC AUTO: 88.5 FL
MONOCYTES # BLD AUTO: 1.09 X10(3) UL (ref 0.1–1)
MONOCYTES NFR BLD AUTO: 10.9 %
NEUTROPHILS # BLD AUTO: 5.46 X10 (3) UL (ref 1.5–7.7)
NEUTROPHILS # BLD AUTO: 5.46 X10(3) UL (ref 1.5–7.7)
NEUTROPHILS NFR BLD AUTO: 54.8 %
NONHDLC SERPL-MCNC: 131 MG/DL (ref ?–130)
OSMOLALITY SERPL CALC.SUM OF ELEC: 302 MOSM/KG (ref 275–295)
PLATELET # BLD AUTO: 377 10(3)UL (ref 150–450)
POTASSIUM SERPL-SCNC: 3.9 MMOL/L (ref 3.5–5.1)
PROT SERPL-MCNC: 7 G/DL (ref 6.4–8.2)
RBC # BLD AUTO: 3.93 X10(6)UL
SODIUM SERPL-SCNC: 142 MMOL/L (ref 136–145)
TRIGL SERPL-MCNC: 287 MG/DL (ref 30–149)
VIT B12 SERPL-MCNC: 307 PG/ML (ref 193–986)
VLDLC SERPL CALC-MCNC: 46 MG/DL (ref 0–30)
WBC # BLD AUTO: 10 X10(3) UL (ref 4–11)

## 2023-02-09 PROCEDURE — 80061 LIPID PANEL: CPT

## 2023-02-09 PROCEDURE — 85025 COMPLETE CBC W/AUTO DIFF WBC: CPT

## 2023-02-09 PROCEDURE — 82607 VITAMIN B-12: CPT

## 2023-02-09 PROCEDURE — 36415 COLL VENOUS BLD VENIPUNCTURE: CPT

## 2023-02-09 PROCEDURE — 83690 ASSAY OF LIPASE: CPT

## 2023-02-09 PROCEDURE — 82746 ASSAY OF FOLIC ACID SERUM: CPT

## 2023-02-09 PROCEDURE — 80053 COMPREHEN METABOLIC PANEL: CPT

## 2023-02-09 PROCEDURE — 99214 OFFICE O/P EST MOD 30 MIN: CPT | Performed by: INTERNAL MEDICINE

## 2023-02-09 NOTE — PROGRESS NOTES
Discussed with patient. Repeat BMP in 4 weeks. Meds adjusted. Please send new blood pressure and pulse readings.

## 2023-02-10 ENCOUNTER — LAB ENCOUNTER (OUTPATIENT)
Dept: LAB | Age: 83
End: 2023-02-10
Attending: INTERNAL MEDICINE
Payer: MEDICARE

## 2023-02-10 DIAGNOSIS — R10.30 LOWER ABDOMINAL PAIN: ICD-10-CM

## 2023-02-10 LAB
BILIRUB UR QL STRIP.AUTO: NEGATIVE
COLOR UR AUTO: YELLOW
GLUCOSE UR STRIP.AUTO-MCNC: NEGATIVE MG/DL
KETONES UR STRIP.AUTO-MCNC: NEGATIVE MG/DL
NITRITE UR QL STRIP.AUTO: POSITIVE
PH UR STRIP.AUTO: 7 [PH] (ref 5–8)
PROT UR STRIP.AUTO-MCNC: NEGATIVE MG/DL
RBC UR QL AUTO: NEGATIVE
SP GR UR STRIP.AUTO: 1.01 (ref 1–1.03)
UROBILINOGEN UR STRIP.AUTO-MCNC: <2 MG/DL

## 2023-02-10 PROCEDURE — 81001 URINALYSIS AUTO W/SCOPE: CPT

## 2023-02-16 ENCOUNTER — HOSPITAL ENCOUNTER (OUTPATIENT)
Dept: CT IMAGING | Facility: HOSPITAL | Age: 83
Discharge: HOME | End: 2023-02-16
Attending: INTERNAL MEDICINE
Payer: MEDICARE

## 2023-02-16 DIAGNOSIS — R10.30 LOWER ABDOMINAL PAIN: ICD-10-CM

## 2023-02-16 PROCEDURE — 74177 CT ABD & PELVIS W/CONTRAST: CPT | Performed by: INTERNAL MEDICINE

## 2023-02-17 ENCOUNTER — MED REC SCAN ONLY (OUTPATIENT)
Dept: PHYSICAL MEDICINE AND REHAB | Facility: CLINIC | Age: 83
End: 2023-02-17

## 2023-02-21 ENCOUNTER — OFFICE VISIT (OUTPATIENT)
Dept: INTERNAL MEDICINE CLINIC | Facility: CLINIC | Age: 83
End: 2023-02-21
Payer: MEDICARE

## 2023-02-21 VITALS
BODY MASS INDEX: 36.1 KG/M2 | SYSTOLIC BLOOD PRESSURE: 180 MMHG | HEART RATE: 48 BPM | DIASTOLIC BLOOD PRESSURE: 80 MMHG | TEMPERATURE: 98 F | HEIGHT: 61 IN | OXYGEN SATURATION: 99 % | WEIGHT: 191.19 LBS | RESPIRATION RATE: 18 BRPM

## 2023-02-21 DIAGNOSIS — R93.5 ABNORMAL ABDOMINAL CT SCAN: Primary | ICD-10-CM

## 2023-02-21 DIAGNOSIS — D64.9 ANEMIA, UNSPECIFIED TYPE: ICD-10-CM

## 2023-02-21 DIAGNOSIS — R73.02 GLUCOSE INTOLERANCE (IMPAIRED GLUCOSE TOLERANCE): Chronic | ICD-10-CM

## 2023-02-21 DIAGNOSIS — I10 WHITE COAT SYNDROME WITH DIAGNOSIS OF HYPERTENSION: Chronic | ICD-10-CM

## 2023-02-21 PROBLEM — M48.061 SPINAL STENOSIS OF LUMBAR REGION WITHOUT NEUROGENIC CLAUDICATION: Chronic | Status: ACTIVE | Noted: 2022-11-22

## 2023-02-21 PROBLEM — M47.816 SPONDYLOSIS OF LUMBAR REGION WITHOUT MYELOPATHY OR RADICULOPATHY: Chronic | Status: ACTIVE | Noted: 2022-11-22

## 2023-02-21 PROBLEM — E53.8 B12 DEFICIENCY: Chronic | Status: ACTIVE | Noted: 2022-05-03

## 2023-02-21 PROBLEM — M16.12 PRIMARY OSTEOARTHRITIS OF LEFT HIP: Chronic | Status: ACTIVE | Noted: 2022-11-22

## 2023-02-21 PROCEDURE — 99214 OFFICE O/P EST MOD 30 MIN: CPT | Performed by: INTERNAL MEDICINE

## 2023-02-21 RX ORDER — FOLIC ACID 1 MG/1
1 TABLET ORAL DAILY
COMMUNITY

## 2023-02-21 RX ORDER — HYDRALAZINE HYDROCHLORIDE 25 MG/1
25 TABLET, FILM COATED ORAL 3 TIMES DAILY
Qty: 270 TABLET | Refills: 1 | Status: SHIPPED | OUTPATIENT
Start: 2023-02-21

## 2023-02-21 NOTE — PATIENT INSTRUCTIONS
Labs discussed with patient. Metformin down regulated. Pending hematology evaluation    CT scan discussed with patient.   Have GI evaluate abdominal pain as well as abnormality of the liver noted on CT scan    Ending MRI liver

## 2023-02-22 ENCOUNTER — TELEPHONE (OUTPATIENT)
Dept: INTERNAL MEDICINE CLINIC | Facility: CLINIC | Age: 83
End: 2023-02-22

## 2023-02-22 DIAGNOSIS — R93.5 ABNORMAL ABDOMINAL CT SCAN: Primary | ICD-10-CM

## 2023-02-22 NOTE — TELEPHONE ENCOUNTER
[address] Urology spoke to [facility] - Dr. Gerardo Johns is scheduling out to July, but pt could be seen by PA [facility] as soon as 3/17/23, or Dr. Jody Palomino around 4/6/23.     [phone] spoke to pt's daughter Justine Kunz and explained the above. Pt is in a lot of pain, but Justine Kunz stated 3/17/23 is much better. Justine Kunz will call to grab that appt with Minus Flax and call us back so name of referral can be changed. Saw the appt come through 3/17/23 1:00pm with Reva Gruber PA-C. First referral was for only 1 visit. Placed new referral for 3 visits with Minus Flax and it was automatically authorized.

## 2023-02-22 NOTE — TELEPHONE ENCOUNTER
Berry Bravo (daughter) calling to advise Dr. Thuy Bah that the Urologist is not available until July, please refer pt to another provider and call with information once referral is updated.

## 2023-03-01 ENCOUNTER — MED REC SCAN ONLY (OUTPATIENT)
Dept: ORTHOPEDICS CLINIC | Facility: CLINIC | Age: 83
End: 2023-03-01

## 2023-03-17 ENCOUNTER — OFFICE VISIT (OUTPATIENT)
Dept: SURGERY | Facility: CLINIC | Age: 83
End: 2023-03-17

## 2023-03-17 DIAGNOSIS — K76.89 LIVER CYST: ICD-10-CM

## 2023-03-17 DIAGNOSIS — N28.89 RENAL MASS: Primary | ICD-10-CM

## 2023-03-17 PROCEDURE — 99203 OFFICE O/P NEW LOW 30 MIN: CPT | Performed by: PHYSICIAN ASSISTANT

## 2023-03-20 RX ORDER — FENOFIBRATE 160 MG/1
TABLET ORAL
Qty: 90 TABLET | Refills: 0 | Status: SHIPPED | OUTPATIENT
Start: 2023-03-20

## 2023-03-24 ENCOUNTER — OFFICE VISIT (OUTPATIENT)
Dept: PHYSICAL MEDICINE AND REHAB | Facility: CLINIC | Age: 83
End: 2023-03-24
Payer: MEDICARE

## 2023-03-24 ENCOUNTER — TELEPHONE (OUTPATIENT)
Dept: PHYSICAL MEDICINE AND REHAB | Facility: CLINIC | Age: 83
End: 2023-03-24

## 2023-03-24 VITALS — OXYGEN SATURATION: 98 % | HEART RATE: 49 BPM

## 2023-03-24 DIAGNOSIS — M54.16 RIGHT LUMBAR RADICULITIS: Primary | ICD-10-CM

## 2023-03-24 DIAGNOSIS — M48.062 LUMBAR STENOSIS WITH NEUROGENIC CLAUDICATION: ICD-10-CM

## 2023-03-24 PROCEDURE — 99214 OFFICE O/P EST MOD 30 MIN: CPT | Performed by: PHYSICAL MEDICINE & REHABILITATION

## 2023-03-24 PROCEDURE — 1125F AMNT PAIN NOTED PAIN PRSNT: CPT | Performed by: PHYSICAL MEDICINE & REHABILITATION

## 2023-03-24 RX ORDER — PREGABALIN 50 MG/1
100 CAPSULE ORAL 2 TIMES DAILY
Qty: 60 CAPSULE | Refills: 0 | Status: SHIPPED | OUTPATIENT
Start: 2023-03-24 | End: 2023-03-24

## 2023-03-24 RX ORDER — PREGABALIN 100 MG/1
100 CAPSULE ORAL 2 TIMES DAILY
Qty: 60 CAPSULE | Refills: 0 | Status: SHIPPED | OUTPATIENT
Start: 2023-03-24

## 2023-03-24 NOTE — TELEPHONE ENCOUNTER
Per Dr. Isadora Wells: \"Instructions on lyrica prescription were incorrectly sent; apologies. Can someone please contact her and tell her to start 1 capsule at night time for 4 nights, and then twice daily afterwards if no side effects and no benefit? I'd appreciate it. \"      Unable to leave VM at Mobile number d/t voice mailbox full  LVM to call back at home number

## 2023-03-24 NOTE — PATIENT INSTRUCTIONS
1) Start the medication  2) Have the MRI of your back done  3) Ideally we would hold off on injection until the MRI is done; if the medication is not helping and if the MRI cannot be done in a timely manner we can go ahead and do injection before the imaging is done. 4) If no better after injection EMG of both legs.

## 2023-03-24 NOTE — PROGRESS NOTES
Progress note    C/C: Patient presents with: Follow - Up: 01/13/23 LOV. She did not start taking the pregabalin. Tylenol daily. Pain has now travelled to R hip and R glute down RLE and into R foot. Tingling in RLE. Pain 8/10. HPI: 56-year-old female presents for follow-up. Since last time I saw her she has had increasing bilateral buttock and right worse than left leg pain radiating down the posterior aspect of the leg to the foot that worsens with standing and walking, with mild relief upon sitting. Went to the ER, was given a dose of steroids with initial improvement. She has been following with her primary care physician Dr. Olivia Manley for a left renal mass, for which she has also seen urology. In the interim she has had an increasingly difficult time tolerating standing and walking. She now uses a walker regularly. Pertinent allergies:   Cortisone               UNKNOWN    Comment:Burning sensation to area injected. Latex                   RASH     Physical exam:  Pulse (!) 49   SpO2 98%      Walks with a shuffling gait, unable to  the legs well due to pain with walking. Walks forward hunched. 4+/5 bilateral hip flexion, knee extension; 5/5 distal LE  Normal sensation to LT b/l LE  1/4 quad, gastrocs reflexes b/l  Babinski test negative b/l    Imaging: No new imaging to review    Assessment and plan  1. Lumbar stenosis with neurogenic claudication  2. Right lumbar radiculitis    Seems likely due to known lumbar stenosis. Start pregabalin 100mg 1 cap PO at bedtime x4 nights, then 1 cap PO BID if no side effects and no benefit. Recommend repeating MRI of the lumbar spine, bilateral L3 TFESI under fluoroscopy, local. We discussed the risks of procedure, including bleeding, infection, nerve injury, HA; elects to proceed. If no improvement recommend EMG bilateral lower extremities, referral to neurosurgery.     Jairo Davenport DO  Physical Medicine and Rehabilitation  [facility] [place]

## 2023-03-24 NOTE — TELEPHONE ENCOUNTER
S/w patient to verbalize updated medication instructions. Patient verbalized understanding and was thankful.

## 2023-03-27 ENCOUNTER — TELEPHONE (OUTPATIENT)
Dept: PHYSICAL MEDICINE AND REHAB | Facility: CLINIC | Age: 83
End: 2023-03-27

## 2023-03-27 NOTE — TELEPHONE ENCOUNTER
Per Medicare Guidelines -no authorization is required for Bilateral L3 transforaminal epidural steroid injection under fluoroscopy CPT 76768     Status: Authorization is not required however may be subject to review once claim is submitted-Covered Benefit

## 2023-03-28 NOTE — TELEPHONE ENCOUNTER
Unable to leave a message- 1st attempt    Procedure: Bilateral L3 transforaminal epidural steroid injection  Anesthesia: Local  Dx: B48.171  Location: [facility]  CPT Codes: 14522

## 2023-03-29 NOTE — TELEPHONE ENCOUNTER
Patient has been scheduled for Bilateral L3 transforaminal epidural steroid injection on 4/3/23 at the [address] [place] [address] with gauzzeana Siemens. -Anesthesia type: local.  -Patient informed to fast 12 hours prior to procedure with IVCS/MAC.   -Scheduling [address] covid testing required for all procedures whether patient is vaccinated or not. -Patient was advised that if he/she does receive the covid vaccine it needs to be at least 2 weeks before or after the injection. -Medications and allergies reviewed. -Patient reminded to hold NSAIDs (Ibuprofen, ASA 81, Aleve, Naproxen, Mobic, Diclofenac, Etodolac, Celebrex etc.) for 3 days prior to Lumbar MBB/Facet if BMI is greater than 35. For Cervical injections only hold multivitamins, Vitamin E, Fish Oil, Phentermine/Lomaira for 7 days prior to injection and NSAIDS.  mg to be held for 7 days prior to injections.  -If patient is receiving MAC/IVCS Phentermine Rae Base) will need to be held for 7 days prior to injection.  -If on blood thinner clearance has been received to hold this medication by provider.   -Patient informed he/she will need a  to and from procedure. [facility] is located in the [facility] [address] 1st floor,  may park in the yellow/purple parking lot. Patient verbalized understanding and agrees with plan.  -----> Scheduled in Epic: Yes  -----> Scheduled in Casetabs:  Yes

## 2023-03-31 NOTE — TELEPHONE ENCOUNTER
Patient daughter is calling because she would like to know what medications patient needs to put on hold for procedure.  Please advise

## 2023-04-03 ENCOUNTER — OFFICE VISIT (OUTPATIENT)
Dept: SURGERY | Facility: CLINIC | Age: 83
End: 2023-04-03
Payer: MEDICARE

## 2023-04-03 DIAGNOSIS — M48.061 LUMBAR STENOSIS WITHOUT NEUROGENIC CLAUDICATION: Primary | ICD-10-CM

## 2023-04-03 NOTE — PROCEDURES
[facility]    BILATERAL LUMBAR TRANSFORAMINAL   NAME:  Bony Yoo    MR #:    [id number] :  3/4/1940     PHYSICIAN:  Mira Titus DO        Operative Report    DATE OF PROCEDURE: 4/3/2023   PREOPERATIVE DIAGNOSES: 1. Lumbar stenosis without neurogenic claudication        POSTOPERATIVE DIAGNOSES:   1. Lumbar stenosis without neurogenic claudication        PROCEDURES: Bilateral L3 transforaminal epidural steroid injections done under fluoroscopic guidance with contrast enhancement. SURGEON: Mira Titus DO   ANESTHESIA: Local   INDICATIONS:      OPERATIVE PROCEDURE:  Written consent was obtained from the patient. The patient was brought into the operating room and placed in the prone position on the fluoroscopy table with pillow underneath her abdomen. The patient's skin was cleaned and draped in a normal sterile fashion. Using AP fluoroscopy, all five lumbar vertebrae were identified. When the third vertebra was identified, fluoroscopy was left anterior obliqued opening up the right L3-4 intervertebral foramen. At this point in time, the patient's skin was anesthetized with 1% PF lidocaine without epinephrine. Then, a 5 inch, 22-gauge spinal needle was inserted and directed towards the right L3-4 intervertebral foramen. When it felt to be in good position, AP fluoroscopy was used to advance the needle to the 6 o'clock position on the right L3 pedicle. At this point in time, Omnipaque-240 contrast was used to obtain a good epidurogram indicating correct needle placement. Then, aspiration was performed. No blood, fluid, or air was aspirated. Then, the patient was injected with a 3 cc solution of 1.5 cc of 6 mg/cc of celestone and 1.5 cc of 1% PF lidocaine without epinephrine. After this, the needle was removed. Then  fluoroscopy was right anterior obliqued opening up the left L3-4 intervertebral foramen.   At this point in time, the patient's skin was anesthetized with 1 to 2 cc of 1% PF lidocaine without epinephrine. Then, a 5 inch, 22-gauge spinal needle was inserted and directed towards the left L3-4 intervertebral foramen. When it felt to be in good position, AP fluoroscopy was used to advance the needle to the 6 o'clock position on the left L3 pedicle. At this point in time, Omnipaque-240 contrast was used to obtain a good epidurogram indicating correct needle placement. Then, aspiration was performed. No blood, fluid, or air was aspirated. Then, the patient was injected with a 3 cc solution of 1.5 cc of 6 mg/cc of celestone and 1.5 cc of 1% PF lidocaine without epinephrine. After this, the needle was removed. The patient's skin was cleaned. Band-Aids were applied. The patient was transferred to the cart and into [facility]. The patient was given discharge instructions and will follow up in the clinic as scheduled. Throughout the whole procedure, the patient's pulse oximetry and vital signs were monitored and they remained completely stable. Also, throughout the whole procedure, prior to injection of any medication, aspiration was performed. No blood, fluid, or air was aspirated at anytime.

## 2023-04-04 NOTE — TELEPHONE ENCOUNTER
No Protocol     Requesting: diclofenac 50mg     LOV: 2/21/23   RTC: 4 weeks   Filled: 2/27/23 # 60 0 refills   Recent Labs: 2/9/23     Upcoming OV: none scheduled

## 2023-04-07 ENCOUNTER — HOSPITAL ENCOUNTER (OUTPATIENT)
Dept: MRI IMAGING | Facility: HOSPITAL | Age: 83
Discharge: HOME | End: 2023-04-07
Attending: PHYSICAL MEDICINE & REHABILITATION
Payer: MEDICARE

## 2023-04-07 DIAGNOSIS — M54.16 RIGHT LUMBAR RADICULITIS: ICD-10-CM

## 2023-04-07 DIAGNOSIS — M48.062 LUMBAR STENOSIS WITH NEUROGENIC CLAUDICATION: ICD-10-CM

## 2023-04-07 PROCEDURE — 72148 MRI LUMBAR SPINE W/O DYE: CPT | Performed by: PHYSICAL MEDICINE & REHABILITATION

## 2023-04-10 ENCOUNTER — HOSPITAL ENCOUNTER (OUTPATIENT)
Age: 83
Discharge: HOME | End: 2023-04-10
Attending: EMERGENCY MEDICINE
Payer: MEDICARE

## 2023-04-10 ENCOUNTER — APPOINTMENT (OUTPATIENT)
Dept: GENERAL RADIOLOGY | Age: 83
End: 2023-04-10
Attending: PHYSICIAN ASSISTANT
Payer: MEDICARE

## 2023-04-10 ENCOUNTER — TELEPHONE (OUTPATIENT)
Dept: PHYSICAL MEDICINE AND REHAB | Facility: CLINIC | Age: 83
End: 2023-04-10

## 2023-04-10 VITALS
HEART RATE: 65 BPM | OXYGEN SATURATION: 97 % | RESPIRATION RATE: 18 BRPM | SYSTOLIC BLOOD PRESSURE: 163 MMHG | TEMPERATURE: 97 F | WEIGHT: 160 LBS | DIASTOLIC BLOOD PRESSURE: 83 MMHG | BODY MASS INDEX: 30 KG/M2

## 2023-04-10 DIAGNOSIS — M79.605 PAIN IN BOTH LOWER EXTREMITIES: Primary | ICD-10-CM

## 2023-04-10 DIAGNOSIS — M79.604 PAIN IN BOTH LOWER EXTREMITIES: Primary | ICD-10-CM

## 2023-04-10 PROCEDURE — 73502 X-RAY EXAM HIP UNI 2-3 VIEWS: CPT | Performed by: PHYSICIAN ASSISTANT

## 2023-04-10 PROCEDURE — 73560 X-RAY EXAM OF KNEE 1 OR 2: CPT | Performed by: PHYSICIAN ASSISTANT

## 2023-04-10 PROCEDURE — 73562 X-RAY EXAM OF KNEE 3: CPT | Performed by: PHYSICIAN ASSISTANT

## 2023-04-10 PROCEDURE — 99214 OFFICE O/P EST MOD 30 MIN: CPT

## 2023-04-10 NOTE — TELEPHONE ENCOUNTER
----- Message from Emily Bridges DO sent at 4/10/2023  3:24 PM CDT -----  MRI lumbar spine reviewed and compared to 2021 imaging; she has a new upper lumbar disc herniation. This can cause pain to refer into the groin or hip. Have her keep her follow up appointment.

## 2023-04-10 NOTE — DISCHARGE INSTRUCTIONS
Take Tylenol as needed for pain. Ice the affected areas. Activity as tolerated. Use your walker as needed for ambulation. Follow up with your primary doctor. If you have new, changing or worsening symptoms, please go directly to the ER.

## 2023-04-10 NOTE — ED INITIAL ASSESSMENT (HPI)
Both leg pain - pt states was walking on the driveway with a walker then tripped on an uneven driveway and fell forward. Denies any LOC. C/o pain when walking. .c/o swelling on both legs and pain when walking. Pt uses a walker to ambulate.

## 2023-04-10 NOTE — TELEPHONE ENCOUNTER
SW patient to advise that Dr. Savanah Arrieta reviewed her MRI of lumbar spine and compared it to her 2021 imaging and he states following:  Ramandeep Farmer has a new upper lumbar disc herniation. This can cause pain to refer into the groin or hip. Have her keep her follow up appointment. \"    Patient stated she would definitely be keeping he follow up appointment thanked this RN for calling to let her know.

## 2023-04-13 DIAGNOSIS — I10 ESSENTIAL HYPERTENSION, BENIGN: ICD-10-CM

## 2023-04-15 RX ORDER — METOPROLOL TARTRATE 100 MG/1
TABLET ORAL
Qty: 180 TABLET | Refills: 0 | Status: SHIPPED | OUTPATIENT
Start: 2023-04-15

## 2023-04-19 ENCOUNTER — OFFICE VISIT (OUTPATIENT)
Dept: PHYSICAL MEDICINE AND REHAB | Facility: CLINIC | Age: 83
End: 2023-04-19
Payer: MEDICARE

## 2023-04-19 VITALS
WEIGHT: 160 LBS | SYSTOLIC BLOOD PRESSURE: 132 MMHG | HEIGHT: 61 IN | DIASTOLIC BLOOD PRESSURE: 64 MMHG | BODY MASS INDEX: 30.21 KG/M2

## 2023-04-19 DIAGNOSIS — M48.062 LUMBAR STENOSIS WITH NEUROGENIC CLAUDICATION: Primary | ICD-10-CM

## 2023-04-19 PROCEDURE — 99214 OFFICE O/P EST MOD 30 MIN: CPT | Performed by: PHYSICAL MEDICINE & REHABILITATION

## 2023-04-19 RX ORDER — PREGABALIN 150 MG/1
150 CAPSULE ORAL NIGHTLY
Qty: 30 CAPSULE | Refills: 0 | Status: SHIPPED | OUTPATIENT
Start: 2023-04-19

## 2023-04-30 DIAGNOSIS — I10 ESSENTIAL HYPERTENSION, BENIGN: ICD-10-CM

## 2023-05-01 RX ORDER — AMLODIPINE BESYLATE 5 MG/1
TABLET ORAL
Qty: 180 TABLET | Refills: 0 | Status: SHIPPED | OUTPATIENT
Start: 2023-05-01

## 2023-05-17 ENCOUNTER — OFFICE VISIT (OUTPATIENT)
Dept: PHYSICAL MEDICINE AND REHAB | Facility: CLINIC | Age: 83
End: 2023-05-17
Payer: MEDICARE

## 2023-05-17 VITALS
WEIGHT: 160 LBS | OXYGEN SATURATION: 97 % | HEART RATE: 48 BPM | BODY MASS INDEX: 30 KG/M2 | DIASTOLIC BLOOD PRESSURE: 68 MMHG | SYSTOLIC BLOOD PRESSURE: 132 MMHG

## 2023-05-17 DIAGNOSIS — M51.26 LUMBAR DISC HERNIATION: ICD-10-CM

## 2023-05-17 DIAGNOSIS — M48.062 LUMBAR STENOSIS WITH NEUROGENIC CLAUDICATION: Primary | ICD-10-CM

## 2023-05-17 PROCEDURE — 99213 OFFICE O/P EST LOW 20 MIN: CPT | Performed by: PHYSICAL MEDICINE & REHABILITATION

## 2023-05-17 PROCEDURE — 1125F AMNT PAIN NOTED PAIN PRSNT: CPT | Performed by: PHYSICAL MEDICINE & REHABILITATION

## 2023-05-17 RX ORDER — PREGABALIN 150 MG/1
150 CAPSULE ORAL NIGHTLY
Qty: 30 CAPSULE | Refills: 0 | Status: SHIPPED | OUTPATIENT
Start: 2023-05-17

## 2023-05-17 RX ORDER — NORTRIPTYLINE HYDROCHLORIDE 10 MG/1
10 CAPSULE ORAL NIGHTLY
Qty: 60 CAPSULE | Refills: 0 | Status: SHIPPED | OUTPATIENT
Start: 2023-05-17

## 2023-05-17 NOTE — PATIENT INSTRUCTIONS
Let me know how you are doing in 2-3 weeks, and if no better we can get you set up for epidural steroid injection on both sides of your back.

## 2023-05-22 ENCOUNTER — MED REC SCAN ONLY (OUTPATIENT)
Dept: PHYSICAL MEDICINE AND REHAB | Facility: CLINIC | Age: 83
End: 2023-05-22

## 2023-06-01 ENCOUNTER — HOSPITAL ENCOUNTER (OUTPATIENT)
Dept: MRI IMAGING | Facility: HOSPITAL | Age: 83
Discharge: HOME | End: 2023-06-01
Attending: PHYSICIAN ASSISTANT
Payer: MEDICARE

## 2023-06-01 DIAGNOSIS — N28.89 RENAL MASS: ICD-10-CM

## 2023-06-01 DIAGNOSIS — K76.89 LIVER CYST: ICD-10-CM

## 2023-06-01 PROCEDURE — A9575 INJ GADOTERATE MEGLUMI 0.1ML: HCPCS | Performed by: PHYSICIAN ASSISTANT

## 2023-06-01 PROCEDURE — 74183 MRI ABD W/O CNTR FLWD CNTR: CPT | Performed by: PHYSICIAN ASSISTANT

## 2023-06-01 RX ORDER — GADOTERATE MEGLUMINE 376.9 MG/ML
15 INJECTION INTRAVENOUS
Status: COMPLETED | OUTPATIENT
Start: 2023-06-01 | End: 2023-06-01

## 2023-06-01 RX ADMIN — GADOTERATE MEGLUMINE 14 ML: 376.9 INJECTION INTRAVENOUS at 11:52:00

## 2023-06-07 ENCOUNTER — MED REC SCAN ONLY (OUTPATIENT)
Dept: PHYSICAL MEDICINE AND REHAB | Facility: CLINIC | Age: 83
End: 2023-06-07

## 2023-06-14 ENCOUNTER — OFFICE VISIT (OUTPATIENT)
Dept: PHYSICAL MEDICINE AND REHAB | Facility: CLINIC | Age: 83
End: 2023-06-14
Payer: MEDICARE

## 2023-06-14 ENCOUNTER — TELEPHONE (OUTPATIENT)
Dept: PHYSICAL MEDICINE AND REHAB | Facility: CLINIC | Age: 83
End: 2023-06-14

## 2023-06-14 VITALS
WEIGHT: 160 LBS | HEIGHT: 61 IN | SYSTOLIC BLOOD PRESSURE: 114 MMHG | DIASTOLIC BLOOD PRESSURE: 56 MMHG | BODY MASS INDEX: 30.21 KG/M2

## 2023-06-14 DIAGNOSIS — M48.062 LUMBAR STENOSIS WITH NEUROGENIC CLAUDICATION: Primary | ICD-10-CM

## 2023-06-14 PROCEDURE — 99214 OFFICE O/P EST MOD 30 MIN: CPT | Performed by: PHYSICAL MEDICINE & REHABILITATION

## 2023-06-14 RX ORDER — PREGABALIN 75 MG/1
75 CAPSULE ORAL EVERY EVENING
Qty: 7 CAPSULE | Refills: 0 | Status: SHIPPED | OUTPATIENT
Start: 2023-06-14

## 2023-06-14 RX ORDER — TRAMADOL HYDROCHLORIDE 50 MG/1
50 TABLET ORAL EVERY EVENING
Qty: 15 TABLET | Refills: 0 | Status: SHIPPED | OUTPATIENT
Start: 2023-06-14

## 2023-06-14 NOTE — TELEPHONE ENCOUNTER
Per CMS Guidelines -no authorization is required for Bilateral L2 transforaminal epidural steroid injection under fluoroscopy CPT 54941-46    Status: Authorization is not required however may be subject to review once claim is submitted-Covered Benefit

## 2023-06-15 NOTE — TELEPHONE ENCOUNTER
Patient calling our office for the 2nd day now try to speak to someone and schedule her injection, informed that we are going true a staffing crisis and there is not enough Nurses or MA's at this time and apologize for the inconvenience. Inform that someone will call when available.

## 2023-06-19 ENCOUNTER — OFFICE VISIT (OUTPATIENT)
Dept: SURGERY | Facility: CLINIC | Age: 83
End: 2023-06-19

## 2023-06-19 DIAGNOSIS — N28.89 RENAL MASS: Primary | ICD-10-CM

## 2023-06-19 LAB
BILIRUBIN: NEGATIVE
GLUCOSE (URINE DIPSTICK): NEGATIVE MG/DL
KETONES (URINE DIPSTICK): NEGATIVE MG/DL
MULTISTIX LOT#: ABNORMAL NUMERIC
NITRITE, URINE: NEGATIVE
OCCULT BLOOD: NEGATIVE
PH, URINE: 6 (ref 4.5–8)
PROTEIN (URINE DIPSTICK): NEGATIVE MG/DL
SPECIFIC GRAVITY: 1.01 (ref 1–1.03)
URINE-COLOR: YELLOW
UROBILINOGEN,SEMI-QN: 0.2 MG/DL (ref 0–1.9)

## 2023-06-19 PROCEDURE — 99214 OFFICE O/P EST MOD 30 MIN: CPT | Performed by: SURGERY

## 2023-06-19 PROCEDURE — 81003 URINALYSIS AUTO W/O SCOPE: CPT | Performed by: SURGERY

## 2023-06-19 RX ORDER — HYDROCODONE BITARTRATE AND ACETAMINOPHEN 5; 325 MG/1; MG/1
1 TABLET ORAL EVERY 8 HOURS PRN
COMMUNITY
Start: 2023-02-06

## 2023-06-19 RX ORDER — DIAZEPAM 2 MG/1
2 TABLET ORAL EVERY 12 HOURS PRN
COMMUNITY
Start: 2023-02-06

## 2023-06-19 RX ORDER — FENOFIBRATE 160 MG/1
TABLET ORAL
Qty: 90 TABLET | Refills: 0 | Status: SHIPPED | OUTPATIENT
Start: 2023-06-19

## 2023-06-19 NOTE — PROGRESS NOTES
Urology Clinic Note    Primary Care Provider:  Óscar June MD     Chief Complaint:   Renal mass followup    HPI:   Cele Concepcion is a 81year old female with history of HTN, OA, HTN seen by Urology Deanna GREGORY on 3/17/23 for a renal mass. CT 2/16/23 showed a 2.4 cm left upper pole renal mass suspicious for RCC. MRI on 6/1/2023 showed a 2.7 cm left upper pole anterior, partially exophytic complex cystic renal mass. In comparison, this was 2.5 cm on CT scan from 2/16/2023. It is just above the superior margin of the left renal vein. She denies gross hematuria or flank pain.     Urinalysis: Large leukocyte esterase    History:     Past Medical History:   Diagnosis Date    Abnormal stress test 3/18/2014    Arthritis of hand 4/18/2018    carpal tunnel syndrome right wrist   Global 11/23/2015 8/26/2015    Cataract 9/9/2014    Left hip pain 2/27/2013    Osteoarthritis of left shoulder, unspecified osteoarthritis type 1/13/2016    Osteoarthrosis, unspecified whether generalized or localized, unspecified site     Other and unspecified hyperlipidemia     PONV (postoperative nausea and vomiting)     slow to awaken    S/P total knee replacement using cement, left 1/13/2016    Senile osteoporosis     Unspecified essential hypertension     Urethral stricture due to infection     Urinary incontinence     Weakness of right hand 4/18/2018       Past Surgical History:   Procedure Laterality Date    APPENDECTOMY      CARDIAC CATHETERIZATION  11/18/2013    HYSTERECTOMY  01/01/1984    YANELI/BSO    KNEE REPLACEMENT SURGERY  11/03/2009    RIGHT    KNEE REPLACEMENT SURGERY  11/11/2008    LEFT    OOPHORECTOMY      OTHER SURGICAL HISTORY Right 08/25/2015    Procedure: WRIST CARPAL TUNNEL RELEASE;  Surgeon: Rogelio Rodriguez MD;  Location: [address] [place] [address] OR    TONSILLECTOMY         Family History   Problem Relation Age of Onset    Breast Cancer Daughter 40    Diabetes Mother     Hypertension Mother     Diabetes Other         sibling, family history of        Social History     Socioeconomic History    Marital status:    Tobacco Use    Smoking status: Never    Smokeless tobacco: Never   Vaping Use    Vaping Use: Never used   Substance and Sexual Activity    Alcohol use: Not Currently     Alcohol/week: 0.0 standard drinks of alcohol    Drug use: No   Other Topics Concern    Caffeine Concern Yes     Comment: 1 cup coffee/day    Exercise No   Social History Narrative    The patient does not use an assistive device. .      The patient does live in a home with stairs. Medications (Active prior to today's visit):  Current Outpatient Medications   Medication Sig Dispense Refill    pregabalin 75 MG Oral Cap Take 1 capsule (75 mg total) by mouth every evening. Weaning dose. 7 capsule 0    traMADol 50 MG Oral Tab Take 1 tablet (50 mg total) by mouth every evening. 15 tablet 0    DICLOFENAC 50 MG Oral Tab EC Take 1 tablet by mouth twice daily 180 tablet 0    AMLODIPINE 5 MG Oral Tab Take 1 tablet by mouth twice daily 180 tablet 0    METOPROLOL TARTRATE 100 MG Oral Tab Take 1 tablet by mouth twice daily 180 tablet 0    FENOFIBRATE 160 MG Oral Tab Take 1 tablet by mouth once daily 90 tablet 0    METFORMIN HCL 1000 MG Oral Tab TAKE 1 TABLET BY MOUTH TWICE DAILY WITH MEALS 357 tablet 0    folic acid 1 MG Oral Tab Take 1 tablet (1 mg total) by mouth daily. hydrALAZINE 25 MG Oral Tab Take 1 tablet (25 mg total) by mouth 3 (three) times daily. 270 tablet 1    metFORMIN HCl 500 MG Oral Tab Take 1 tablet (500 mg total) by mouth 2 (two) times daily with meals. Cyanocobalamin (VITAMIN B-12) 500 MCG Oral Lozenge Take 1 tablet by mouth daily. 90 lozenge 3    losartan-hydroCHLOROthiazide 100-25 MG Oral Tab Take 1 tablet by mouth daily. 90 tablet 3    Multiple Vitamins-Minerals (ICAPS AREDS FORMULA) Oral Tab Take 1 each by mouth 2 (two) times daily. Cholecalciferol (VITAMIN D3) 1000 units Oral Cap Take 1 tablet by mouth daily.       Omega 3 1000 MG Oral Cap Take 3 capsules by mouth daily. 90 capsule 3    ASPIRIN 81 MG OR TBEC 1 TABLET DAILY         Allergies:    Cortisone               UNKNOWN    Comment:Burning sensation to area injected. Latex                   RASH    Review of Systems:   A comprehensive 10-point review of systems was completed. Pertinent positives and negatives are noted in the the HPI. Physical Exam:   CONSTITUTIONAL: Well developed, well nourished, in no acute distress  NEUROLOGIC: Alert and oriented  HEAD: Normocephalic, atraumatic  EYES: Sclera non-icteric  ENT: Hearing intact, moist mucous membranes  NECK: No obvious goiter or masses  RESPIRATORY: Normal respiratory effort  SKIN: No evident rashes  ABDOMEN: Soft, non-tender, non-distended    Assessment & Plan:   Lugene Ahumada is a 81year old female with history of HTN, OA, HTN seen by Urology Mary GREGORY on 3/17/23 for a renal mass. CT 2/16/23 showed a 2.4 cm left upper pole renal mass suspicious for RCC. MRI on 6/1/2023 showed a 2.7 cm left upper pole anterior, partially exophytic complex cystic renal mass. In comparison, this was 2.5 cm on CT scan from 2/16/2023. It is just above the superior margin of the left renal vein. She denies gross hematuria or flank pain. I discussed with the patient the following options for small renal masses, based on the American Urologic Association guidelines. 1.  Active surveillance - for patients with a solid renal mass less than 2 cm this is the preferred method of management. Active surveillance protocol includes cross-sectional imaging in 3 to 6 months to assess for interval growth. If there is growth of the tumor to greater than 3 cm, stage progression, growth kinetics of greater than 5 mm/year, or change in patient/tumor factors the patient would move from active surveillance to treatment options. 2.  Thermal/Cryo ablation - thermal ablation is an approach for cT1a solid renal masses less than 3 cm.  Risks include of tumor persistence, local recurrence/need for additional treatment (~15%), and potential dermal spread. 3.  Radical nephrectomy- radical nephrectomy is typically indicated when there is high tumor complexity and partial nephrectomy be would challenging even in experienced hands. Additionally the patient should typically have no pre-existing CKD or proteinuria and have a normal contralateral kidney. Complications include bleeding (immediate or delayed), infection, damage to surrounding structures (bowel, ureter, major blood vessels, spleen, stomach and pancreas for a left-sided tumor and liver right-sided tumor), need for additional procedures, or conversion to open surgery. Hospital course is typically 1-2 nights in the hospital.     4.  Partial nephrectomy- partial nephrectomy is favored to help spare healthy nephrons when surgery is opted for. I perform this via a minimally-invasive robotic approach. Complications include bleeding (immediate or delayed), infection, damage to surrounding structures (bowel, ureter, major blood vessels, spleen, stomach and pancreas for a left-sided tumor and liver right-sided tumor), need for additional procedures, inability to remove entire tumor, tumor recurrence, pseudoaneurysm, conversion to open surgery, or need for conversion to radical nephrectomy. Hospital course is typically 1-2 nights in the hospital.     5.  Renal mass biopsy -this is typically performed when the mass is of unclear appearance (hematologic, metastatic, inflammatory or infectious). It is typically done when biopsy is thought to change patient's management. Mass should additionally be in a feasible location for biopsy by intervention al radiology.     -Given minimal growth on interval imaging and patient age/comorbidities we will proceed with active surveillance and repeat MRI in 6 months    In total, 30 minutes were spent on this patient encounter (including chart review, patient history, physical, and counseling, documentation, and communication). Olu Louise.  Kayode Dong MD  Staff Urologist  [place] [address]  Office: [phone]

## 2023-06-22 RX ORDER — NITROFURANTOIN 25; 75 MG/1; MG/1
100 CAPSULE ORAL 2 TIMES DAILY
Qty: 20 CAPSULE | Refills: 0 | Status: SHIPPED | OUTPATIENT
Start: 2023-06-22 | End: 2023-07-02

## 2023-06-23 ENCOUNTER — OFFICE VISIT (OUTPATIENT)
Dept: SURGERY | Facility: CLINIC | Age: 83
End: 2023-06-23
Payer: MEDICARE

## 2023-06-23 DIAGNOSIS — M48.062 LUMBAR STENOSIS WITH NEUROGENIC CLAUDICATION: ICD-10-CM

## 2023-06-23 PROCEDURE — 64483 NJX AA&/STRD TFRM EPI L/S 1: CPT | Performed by: PHYSICAL MEDICINE & REHABILITATION

## 2023-06-25 NOTE — PROCEDURES
Patient Name: Sruthi Grant MRN: [id number] Date of Surgery: 06/23/2023     PREOPERATIVE DIAGNOSIS Lumbar stenosis. POSTOPERATIVE DIAGNOSIS Lumbar stenosis. PROCEDURE PERFORMED Bilateral L2 transforaminal epidural steroid injection under fluoroscopy with contrast.      SURGEON: Lauren Deng DO     ANESTHESIA: Local.        DESCRIPTION OF PROCEDURE: Informed consent was obtained and verified. The patient was brought to the procedure room and was positioned prone on the procedure room table with a pillow underneath her abdomen. Vital signs were monitored throughout the case. The overlying skin was cleansed and draped in the usual sterile fashion. All 5 lumbar vertebra were identified. The right L2-L3 intervertebral foramen was then identified under right lateral oblique fluoroscopy. The overlying skin was anesthetized with 1 to 2 mL of 1% preservative-free lidocaine without epinephrine. A 5-inch 22-gauge Quincke needle was then advanced towards the right L2-L3 intervertebral foramen under intermittent oblique and AP fluoroscopy. The needle was advanced towards the 6 o'clock position of the pedicle under the AP view. When the needle was felt to be in good position, aspiration was performed and was negative for heme, air, or CSF. Omnipaque-240 was then injected with an epidural pattern seen, no vascular pattern. The patient was then injected with 9 mg of Celestone and 1.5 mL of 1% preservative-free lidocaine without epinephrine for a total injectate of 3 mL. The needle was then withdrawn. Attention was then turned to the left side. The left L2-L3 intervertebral foramen was identified under left ipsilateral oblique view. The overlying skin was anesthetized with 1 to 2 mL of 1% preservative-free lidocaine without epinephrine. Using a 3.5-inch 22-gauge Quincke needle, the needle was advanced to the left L2-L3 intervertebral foramen under intermittent left ipsilateral oblique and AP fluoroscopy. The needle was advanced towards the 6 o'clock position of the left L2 pedicle under AP fluoroscopy. When the needle was felt to be in good position, aspiration was performed and was negative for heme, air, or CSF. Omnipaque-240 was then injected with an epidural pattern seen, no vascular flow seen. The patient was then injected with 9 mg of Celestone and 1.5 mL of 1% preservative-free lidocaine without epinephrine for a total injectate of 3 mL. The needle was then withdrawn. The skin was cleansed. A bandage placed over the site of injection. The patient was brought to Recovery in stable condition. She will follow up with me in 4 weeks.            D: 06/23/2023 _________________________  T: 06/23/2023 DO GARRET [id number]/P

## 2023-06-26 ENCOUNTER — MED REC SCAN ONLY (OUTPATIENT)
Dept: PHYSICAL MEDICINE AND REHAB | Facility: CLINIC | Age: 83
End: 2023-06-26

## 2023-07-03 ENCOUNTER — MED REC SCAN ONLY (OUTPATIENT)
Dept: PHYSICAL MEDICINE AND REHAB | Facility: CLINIC | Age: 83
End: 2023-07-03

## 2023-07-05 NOTE — TELEPHONE ENCOUNTER
Reviewed patients chart, xray orders are required. Order placed for rt shoulder xrays.  Please contact patient advise to arrive 30 mins prior to patients appt to complete x-ray order and schedule patients xray appt-Thank you Body Location Override (Optional - Billing Will Still Be Based On Selected Body Map Location If Applicable): LEFT CENTRAL MALAR CHEEK Detail Level: Detailed Add 45150 Cpt? (Important Note: In 2017 The Use Of 29661 Is Being Tracked By Cms To Determine Future Global Period Reimbursement For Global Periods): yes

## 2023-07-10 ENCOUNTER — OFFICE VISIT (OUTPATIENT)
Dept: INTERNAL MEDICINE CLINIC | Facility: CLINIC | Age: 83
End: 2023-07-10
Payer: MEDICARE

## 2023-07-10 VITALS
HEIGHT: 61 IN | HEART RATE: 57 BPM | BODY MASS INDEX: 35.05 KG/M2 | WEIGHT: 185.63 LBS | DIASTOLIC BLOOD PRESSURE: 78 MMHG | SYSTOLIC BLOOD PRESSURE: 176 MMHG | TEMPERATURE: 98 F | OXYGEN SATURATION: 96 %

## 2023-07-10 DIAGNOSIS — E53.8 B12 DEFICIENCY: Chronic | ICD-10-CM

## 2023-07-10 DIAGNOSIS — R73.02 GLUCOSE INTOLERANCE (IMPAIRED GLUCOSE TOLERANCE): Chronic | ICD-10-CM

## 2023-07-10 DIAGNOSIS — I10 WHITE COAT SYNDROME WITH DIAGNOSIS OF HYPERTENSION: Primary | Chronic | ICD-10-CM

## 2023-07-10 DIAGNOSIS — E78.1 HYPERTRIGLYCERIDEMIA: Chronic | ICD-10-CM

## 2023-07-10 PROCEDURE — 99213 OFFICE O/P EST LOW 20 MIN: CPT | Performed by: INTERNAL MEDICINE

## 2023-07-10 NOTE — PATIENT INSTRUCTIONS
Patient following up with the urologist for the renal mass. Patient still does not want an upper and lower endoscopy.   Recommendations once results are available  Blood work to be done in a few weeks

## 2023-07-19 ENCOUNTER — LAB ENCOUNTER (OUTPATIENT)
Dept: LAB | Age: 83
End: 2023-07-19
Attending: INTERNAL MEDICINE
Payer: MEDICARE

## 2023-07-19 DIAGNOSIS — E78.1 HYPERTRIGLYCERIDEMIA: Chronic | ICD-10-CM

## 2023-07-19 DIAGNOSIS — R73.02 GLUCOSE INTOLERANCE (IMPAIRED GLUCOSE TOLERANCE): Chronic | ICD-10-CM

## 2023-07-19 DIAGNOSIS — I10 WHITE COAT SYNDROME WITH DIAGNOSIS OF HYPERTENSION: Chronic | ICD-10-CM

## 2023-07-19 DIAGNOSIS — E53.8 B12 DEFICIENCY: ICD-10-CM

## 2023-07-19 LAB
ALBUMIN SERPL-MCNC: 3.9 G/DL (ref 3.4–5)
ALBUMIN/GLOB SERPL: 1.3 {RATIO} (ref 1–2)
ALP LIVER SERPL-CCNC: 38 U/L
ALT SERPL-CCNC: 24 U/L
ANION GAP SERPL CALC-SCNC: 5 MMOL/L (ref 0–18)
AST SERPL-CCNC: 16 U/L (ref 15–37)
BASOPHILS # BLD AUTO: 0.04 X10(3) UL (ref 0–0.2)
BASOPHILS NFR BLD AUTO: 0.7 %
BILIRUB SERPL-MCNC: 0.4 MG/DL (ref 0.1–2)
BUN BLD-MCNC: 28 MG/DL (ref 7–18)
CALCIUM BLD-MCNC: 10.5 MG/DL (ref 8.5–10.1)
CHLORIDE SERPL-SCNC: 108 MMOL/L (ref 98–112)
CHOLEST SERPL-MCNC: 194 MG/DL (ref ?–200)
CO2 SERPL-SCNC: 27 MMOL/L (ref 21–32)
CREAT BLD-MCNC: 1.05 MG/DL
EOSINOPHIL # BLD AUTO: 0.26 X10(3) UL (ref 0–0.7)
EOSINOPHIL NFR BLD AUTO: 4.3 %
ERYTHROCYTE [DISTWIDTH] IN BLOOD BY AUTOMATED COUNT: 14.5 %
EST. AVERAGE GLUCOSE BLD GHB EST-MCNC: 126 MG/DL (ref 68–126)
FASTING PATIENT LIPID ANSWER: YES
FASTING STATUS PATIENT QL REPORTED: YES
FOLATE SERPL-MCNC: 44.1 NG/ML (ref 8.7–?)
GFR SERPLBLD BASED ON 1.73 SQ M-ARVRAT: 53 ML/MIN/1.73M2 (ref 60–?)
GLOBULIN PLAS-MCNC: 3.1 G/DL (ref 2.8–4.4)
GLUCOSE BLD-MCNC: 98 MG/DL (ref 70–99)
HBA1C MFR BLD: 6 % (ref ?–5.7)
HCT VFR BLD AUTO: 34.7 %
HDLC SERPL-MCNC: 41 MG/DL (ref 40–59)
HGB BLD-MCNC: 10.8 G/DL
IMM GRANULOCYTES # BLD AUTO: 0.03 X10(3) UL (ref 0–1)
IMM GRANULOCYTES NFR BLD: 0.5 %
LDLC SERPL CALC-MCNC: 111 MG/DL (ref ?–100)
LYMPHOCYTES # BLD AUTO: 1.98 X10(3) UL (ref 1–4)
LYMPHOCYTES NFR BLD AUTO: 32.7 %
MCH RBC QN AUTO: 26.6 PG (ref 26–34)
MCHC RBC AUTO-ENTMCNC: 31.1 G/DL (ref 31–37)
MCV RBC AUTO: 85.5 FL
MONOCYTES # BLD AUTO: 0.7 X10(3) UL (ref 0.1–1)
MONOCYTES NFR BLD AUTO: 11.6 %
NEUTROPHILS # BLD AUTO: 3.04 X10 (3) UL (ref 1.5–7.7)
NEUTROPHILS # BLD AUTO: 3.04 X10(3) UL (ref 1.5–7.7)
NEUTROPHILS NFR BLD AUTO: 50.2 %
NONHDLC SERPL-MCNC: 153 MG/DL (ref ?–130)
OSMOLALITY SERPL CALC.SUM OF ELEC: 295 MOSM/KG (ref 275–295)
PLATELET # BLD AUTO: 318 10(3)UL (ref 150–450)
POTASSIUM SERPL-SCNC: 4 MMOL/L (ref 3.5–5.1)
PROT SERPL-MCNC: 7 G/DL (ref 6.4–8.2)
RBC # BLD AUTO: 4.06 X10(6)UL
SODIUM SERPL-SCNC: 140 MMOL/L (ref 136–145)
TRIGL SERPL-MCNC: 243 MG/DL (ref 30–149)
VIT B12 SERPL-MCNC: 309 PG/ML (ref 193–986)
VLDLC SERPL CALC-MCNC: 42 MG/DL (ref 0–30)
WBC # BLD AUTO: 6.1 X10(3) UL (ref 4–11)

## 2023-07-19 PROCEDURE — 83036 HEMOGLOBIN GLYCOSYLATED A1C: CPT

## 2023-07-19 PROCEDURE — 82607 VITAMIN B-12: CPT

## 2023-07-19 PROCEDURE — 80061 LIPID PANEL: CPT

## 2023-07-19 PROCEDURE — 85025 COMPLETE CBC W/AUTO DIFF WBC: CPT

## 2023-07-19 PROCEDURE — 82746 ASSAY OF FOLIC ACID SERUM: CPT

## 2023-07-19 PROCEDURE — 80053 COMPREHEN METABOLIC PANEL: CPT

## 2023-07-19 PROCEDURE — 36415 COLL VENOUS BLD VENIPUNCTURE: CPT

## 2023-07-22 DIAGNOSIS — I10 ESSENTIAL HYPERTENSION, BENIGN: ICD-10-CM

## 2023-07-24 RX ORDER — METOPROLOL TARTRATE 100 MG/1
TABLET ORAL
Qty: 180 TABLET | Refills: 0 | Status: SHIPPED | OUTPATIENT
Start: 2023-07-24

## 2023-07-24 NOTE — TELEPHONE ENCOUNTER
Passed protocol - metoprolol  Failed protocol - metformin     Last refill:    METFORMIN HCL 1000 MG Oral Tab 180 tablet 0 3/17/2023    Sig:   TAKE 1 TABLET BY MOUTH TWICE DAILY WITH MEALS       METOPROLOL TARTRATE 100 MG Oral Tab 180 tablet 0 4/15/2023    Sig:   Take 1 tablet by mouth twice daily       LOV:   07/10/2023 Dr Thuy Bah RTC 6 months  No FOV scheduled

## 2023-07-29 DIAGNOSIS — I10 ESSENTIAL HYPERTENSION, BENIGN: ICD-10-CM

## 2023-07-31 RX ORDER — AMLODIPINE BESYLATE 5 MG/1
5 TABLET ORAL 2 TIMES DAILY
Qty: 180 TABLET | Refills: 0 | Status: SHIPPED | OUTPATIENT
Start: 2023-07-31

## 2023-07-31 NOTE — TELEPHONE ENCOUNTER
Protocol PASSED    Requesting: AMLODIPINE 5 MG Oral Tab     LOV: 7/10/23 Dr. Ebony Hooker  RTC: 6 months   Filled: 5/1/23 180 tablet 0 refill   Recent Labs: 7/19/23    Upcoming OV   Future Appointments   Date Time Provider Isiah Anne   8/3/2023  1:45 PM Herminio Rose MD [facility] [place]   8/16/2023 10:20 AM Rigo Dasilva DO [facility]   12/19/2023 10:30 AM Devon Monsivais MD [facility]

## 2023-08-03 ENCOUNTER — OFFICE VISIT (OUTPATIENT)
Dept: INTERNAL MEDICINE CLINIC | Facility: CLINIC | Age: 83
End: 2023-08-03
Payer: MEDICARE

## 2023-08-03 VITALS
WEIGHT: 185.38 LBS | BODY MASS INDEX: 35 KG/M2 | HEART RATE: 56 BPM | TEMPERATURE: 98 F | RESPIRATION RATE: 16 BRPM | DIASTOLIC BLOOD PRESSURE: 80 MMHG | OXYGEN SATURATION: 99 % | HEIGHT: 61 IN | SYSTOLIC BLOOD PRESSURE: 160 MMHG

## 2023-08-03 DIAGNOSIS — D64.9 ANEMIA, UNSPECIFIED TYPE: Primary | ICD-10-CM

## 2023-08-03 PROCEDURE — 99213 OFFICE O/P EST LOW 20 MIN: CPT | Performed by: INTERNAL MEDICINE

## 2023-08-16 ENCOUNTER — OFFICE VISIT (OUTPATIENT)
Dept: PHYSICAL MEDICINE AND REHAB | Facility: CLINIC | Age: 83
End: 2023-08-16
Payer: MEDICARE

## 2023-08-16 DIAGNOSIS — M25.552 LEFT HIP PAIN: Primary | ICD-10-CM

## 2023-08-16 PROCEDURE — 99213 OFFICE O/P EST LOW 20 MIN: CPT | Performed by: PHYSICAL MEDICINE & REHABILITATION

## 2023-08-16 RX ORDER — DULOXETIN HYDROCHLORIDE 20 MG/1
20 CAPSULE, DELAYED RELEASE ORAL DAILY
Qty: 30 CAPSULE | Refills: 2 | Status: SHIPPED | OUTPATIENT
Start: 2023-08-16

## 2023-08-16 NOTE — PATIENT INSTRUCTIONS
Side effects to look out for are drowsiness, dizziness, stomach upset/nausea. If you have mild symptoms continue the medication as it will probably go away. If the side effects are significant stop the medication. If no side effects and no benefit continue the medication for at least 6 weeks.

## 2023-08-21 ENCOUNTER — TELEPHONE (OUTPATIENT)
Dept: PHYSICAL MEDICINE AND REHAB | Facility: CLINIC | Age: 83
End: 2023-08-21

## 2023-08-21 NOTE — TELEPHONE ENCOUNTER
Pt has stopped taking the (DULoxetine 20 MG Oral Cap DR Particles)  he prescribed, it made her throw up, she stopped taking it on Saturday Aug 19th. At this time patient does not want any replacement medication.   Just an [place]

## 2023-08-21 NOTE — TELEPHONE ENCOUNTER
Requesting   Name from pharmacy: Diclofenac Sodium 50 MG Oral Tablet Delayed Release          Will file in chart as: DICLOFENAC 50 MG Oral Tab EC    Sig: Take 1 tablet by mouth twice daily    Disp: 180 tablet    Refills: 0    Start: 8/17/2023    Class: Normal    Non-formulary    Last ordered: 3 months ago by Lila Neal MD Last refill: 8/11/2023    Rx #: [id number]     LOV: 8/3/2023  RTC: 4 weeks   Last Relevant Labs: n/a  Filled: 5/9/2023 #180 with 0 refills    Future Appointments   Date Time Provider Isiah Rodríguez   9/11/2023  2:30 PM WDR MRI RM1 (1.5T WIDE) WDR MRI EDW [facility]   10/18/2023  8:20 AM Carlos Yepez DO [facility] SUB GI   12/19/2023 10:30 AM Maira Chaudhari MD [facility]

## 2023-08-28 DIAGNOSIS — I10 WHITE COAT SYNDROME WITH DIAGNOSIS OF HYPERTENSION: Chronic | ICD-10-CM

## 2023-08-29 RX ORDER — HYDRALAZINE HYDROCHLORIDE 25 MG/1
25 TABLET, FILM COATED ORAL 3 TIMES DAILY
Qty: 270 TABLET | Refills: 0 | Status: SHIPPED | OUTPATIENT
Start: 2023-08-29

## 2023-09-11 ENCOUNTER — HOSPITAL ENCOUNTER (OUTPATIENT)
Dept: MRI IMAGING | Age: 83
Discharge: HOME | End: 2023-09-11
Attending: PHYSICAL MEDICINE & REHABILITATION
Payer: MEDICARE

## 2023-09-11 DIAGNOSIS — M25.552 LEFT HIP PAIN: ICD-10-CM

## 2023-09-11 PROCEDURE — 73721 MRI JNT OF LWR EXTRE W/O DYE: CPT | Performed by: PHYSICAL MEDICINE & REHABILITATION

## 2023-09-19 RX ORDER — FENOFIBRATE 160 MG/1
TABLET ORAL
Qty: 90 TABLET | Refills: 1 | Status: SHIPPED | OUTPATIENT
Start: 2023-09-19

## 2023-09-19 NOTE — TELEPHONE ENCOUNTER
LOV: 8/3/23   RTC:4 weeks   Filled 6/19/23 # 90 0 refills   Labs: 7/19/23   Future Appointments   Date Time Provider Isiah Rodríguez   9/22/2023 11:40 AM Shira Lane DO PM&R [facility]   10/18/2023  8:20 AM Meghna Son DO [facility] SUB GI   12/19/2023 10:30 AM Hortensia Matthews MD [facility]

## 2023-09-22 ENCOUNTER — OFFICE VISIT (OUTPATIENT)
Dept: PHYSICAL MEDICINE AND REHAB | Facility: CLINIC | Age: 83
End: 2023-09-22
Payer: MEDICARE

## 2023-09-22 VITALS — OXYGEN SATURATION: 97 % | BODY MASS INDEX: 35 KG/M2 | WEIGHT: 183 LBS | HEART RATE: 56 BPM

## 2023-09-22 DIAGNOSIS — M16.12 PRIMARY OSTEOARTHRITIS OF LEFT HIP: Primary | ICD-10-CM

## 2023-09-22 PROCEDURE — 1125F AMNT PAIN NOTED PAIN PRSNT: CPT | Performed by: PHYSICAL MEDICINE & REHABILITATION

## 2023-09-22 PROCEDURE — 99214 OFFICE O/P EST MOD 30 MIN: CPT | Performed by: PHYSICAL MEDICINE & REHABILITATION

## 2023-09-22 RX ORDER — NAPROXEN 500 MG/1
500 TABLET ORAL 2 TIMES DAILY WITH MEALS
Qty: 60 TABLET | Refills: 0 | Status: SHIPPED | OUTPATIENT
Start: 2023-09-22

## 2023-09-25 ENCOUNTER — TELEPHONE (OUTPATIENT)
Dept: ORTHOPEDICS CLINIC | Facility: CLINIC | Age: 83
End: 2023-09-25

## 2023-09-25 NOTE — TELEPHONE ENCOUNTER
No new images needed   Patient had MRI done 9/11/23  And x-ray 4/10/23 that showed the same as from 3/10/2022

## 2023-10-10 NOTE — TELEPHONE ENCOUNTER
Patient is due for AWV in November   Mailbox is full     No Protocol   Folic acid 1mg filled: 7/9/23 # 90 0 refills     Protocol passed   Losartan-hydrochlorothiazide 100-25mg filled: 7/9/23 # 90     LOV: 8/3/23   RTC: 4 weeks   Labs: 7/19/23   Future Appointments   Date Time Provider Isiah Anne   10/18/2023  8:20 AM Rut Carnes DO [facility] SUB GI   11/6/2023  9:20 AM Jose Crow MD [facility] 75 [facility]   12/19/2023 10:30 AM Noel Rm MD [facility]

## 2023-10-11 RX ORDER — FOLIC ACID 1 MG/1
1 TABLET ORAL DAILY
Qty: 90 TABLET | Refills: 0 | Status: SHIPPED | OUTPATIENT
Start: 2023-10-11

## 2023-10-11 RX ORDER — LOSARTAN POTASSIUM AND HYDROCHLOROTHIAZIDE 25; 100 MG/1; MG/1
1 TABLET ORAL DAILY
Qty: 90 TABLET | Refills: 0 | Status: SHIPPED | OUTPATIENT
Start: 2023-10-11

## 2023-10-20 ENCOUNTER — LAB ENCOUNTER (OUTPATIENT)
Dept: LAB | Age: 83
End: 2023-10-20
Attending: INTERNAL MEDICINE
Payer: MEDICARE

## 2023-10-20 DIAGNOSIS — D50.9 IRON DEFICIENCY ANEMIA, UNSPECIFIED IRON DEFICIENCY ANEMIA TYPE: ICD-10-CM

## 2023-10-20 DIAGNOSIS — R63.4 WEIGHT LOSS: ICD-10-CM

## 2023-10-20 LAB
BASOPHILS # BLD AUTO: 0.05 X10(3) UL (ref 0–0.2)
BASOPHILS NFR BLD AUTO: 0.8 %
DEPRECATED HBV CORE AB SER IA-ACNC: 37.9 NG/ML
EOSINOPHIL # BLD AUTO: 0.21 X10(3) UL (ref 0–0.7)
EOSINOPHIL NFR BLD AUTO: 3.3 %
ERYTHROCYTE [DISTWIDTH] IN BLOOD BY AUTOMATED COUNT: 14.6 %
HCT VFR BLD AUTO: 32.7 %
HGB BLD-MCNC: 10.3 G/DL
IGA SERPL-MCNC: 165 MG/DL (ref 70–312)
IMM GRANULOCYTES # BLD AUTO: 0.03 X10(3) UL (ref 0–1)
IMM GRANULOCYTES NFR BLD: 0.5 %
IRON SATN MFR SERPL: 13 %
IRON SERPL-MCNC: 71 UG/DL
LYMPHOCYTES # BLD AUTO: 2.27 X10(3) UL (ref 1–4)
LYMPHOCYTES NFR BLD AUTO: 36 %
MCH RBC QN AUTO: 27.1 PG (ref 26–34)
MCHC RBC AUTO-ENTMCNC: 31.5 G/DL (ref 31–37)
MCV RBC AUTO: 86.1 FL
MONOCYTES # BLD AUTO: 0.6 X10(3) UL (ref 0.1–1)
MONOCYTES NFR BLD AUTO: 9.5 %
NEUTROPHILS # BLD AUTO: 3.15 X10 (3) UL (ref 1.5–7.7)
NEUTROPHILS # BLD AUTO: 3.15 X10(3) UL (ref 1.5–7.7)
NEUTROPHILS NFR BLD AUTO: 49.9 %
PLATELET # BLD AUTO: 372 10(3)UL (ref 150–450)
RBC # BLD AUTO: 3.8 X10(6)UL
TIBC SERPL-MCNC: 535 UG/DL (ref 240–450)
TRANSFERRIN SERPL-MCNC: 359 MG/DL (ref 200–360)
WBC # BLD AUTO: 6.3 X10(3) UL (ref 4–11)

## 2023-10-20 PROCEDURE — 86364 TISS TRNSGLTMNASE EA IG CLAS: CPT

## 2023-10-20 PROCEDURE — 85025 COMPLETE CBC W/AUTO DIFF WBC: CPT

## 2023-10-20 PROCEDURE — 82784 ASSAY IGA/IGD/IGG/IGM EACH: CPT

## 2023-10-20 PROCEDURE — 82728 ASSAY OF FERRITIN: CPT

## 2023-10-20 PROCEDURE — 36415 COLL VENOUS BLD VENIPUNCTURE: CPT

## 2023-10-20 PROCEDURE — 83540 ASSAY OF IRON: CPT

## 2023-10-20 PROCEDURE — 83550 IRON BINDING TEST: CPT

## 2023-10-22 DIAGNOSIS — I10 ESSENTIAL HYPERTENSION, BENIGN: ICD-10-CM

## 2023-10-23 NOTE — TELEPHONE ENCOUNTER
metFORMIN HCl 1000 MG Oral Tablet          Will file in chart as: METFORMIN HCL 1000 MG Oral Tab    Sig: TAKE 1 TABLET BY MOUTH TWICE DAILY WITH MEALS    Disp: 180 tablet    Refills: 0    Start: 10/22/2023    Class: Normal    Non-formulary    Last ordered: 3 months ago (7/24/2023) by Ajit Soriano MD    Last refill: 7/24/2023    Rx #: [id number]    Diabetic Medication Protocol [id number]/22/2023 09:49 AM   Protocol Details Microalbumin procedure in past 12 months or taking ACE/ARB    HgBA1C procedure resulted in past 6 months    Last HgBA1C < 7.5    Appointment in past 6 or next 3 months      FAILED PROTOCOL     LOV 8/3/23  RTC 4 weeks   Filled 7/24/23 180 tabs 0 refills   Last A1c 7/19/23  Future Appointments   Date Time Provider Isiah Rodríguez   11/6/2023  9:20 AM Sommer Hanna MD [facility]THO 75 [facility]   11/29/2023 12:30 PM Darrius Calderón [facility] ECC SUB GI   11/29/2023  1:00 PM Darrius Calderón [facility] ECC SUB GI   12/19/2023 10:30 AM Corona Mccann MD [facility]

## 2023-10-24 RX ORDER — METOPROLOL TARTRATE 100 MG/1
TABLET ORAL
Qty: 180 TABLET | Refills: 0 | Status: SHIPPED | OUTPATIENT
Start: 2023-10-24

## 2023-10-24 NOTE — TELEPHONE ENCOUNTER
Metoprolol tartrate 100mg     LOV: 8/3/23   RTC: 4 weeks   Labs: 7/19/23   Filled: 7/24/23 # 180 0 refills   Future Appointments   Date Time Provider Isiah Rodríguez   11/6/2023  9:20 AM Prince Canas MD [facility] 75 [facility]   11/29/2023 12:30 PM Veronika Nj, DO [address] [place] [address] SUB GI   11/29/2023  1:00 PM Veronika Nj [address] SUB GI   12/19/2023 10:30 AM Gale Zepeda MD [facility]

## 2023-10-26 LAB — TTG IGA SER-ACNC: 0.3 U/ML (ref ?–7)

## 2023-10-30 DIAGNOSIS — I10 ESSENTIAL HYPERTENSION, BENIGN: ICD-10-CM

## 2023-10-31 RX ORDER — AMLODIPINE BESYLATE 5 MG/1
5 TABLET ORAL 2 TIMES DAILY
Qty: 180 TABLET | Refills: 0 | Status: SHIPPED | OUTPATIENT
Start: 2023-10-31

## 2023-10-31 NOTE — TELEPHONE ENCOUNTER
Name from pharmacy: amLODIPine Besylate 5 MG Oral Tablet          Will file in chart as: AMLODIPINE 5 MG Oral Tab    Sig: Take 1 tablet by mouth twice daily    Disp: 180 tablet    Refills: 0    Start: 10/30/2023    Class: Normal    Non-formulary For: Essential hypertension, benign    Last ordered: 3 months ago (7/31/2023) by Edy Hanks MD    Last refill: 7/31/2023    Rx #: [id number]    Hypertension Medications Protocol Dvlraw89/30/2023 10:23 AM   Protocol Details CMP or BMP in past 12 months    Last serum creatinine< 2.0    Appointment in past 6 or next 3 months      [facility] [address] [phone], [phone]

## 2023-11-06 ENCOUNTER — TELEPHONE (OUTPATIENT)
Dept: NEUROLOGY | Facility: CLINIC | Age: 83
End: 2023-11-06

## 2023-11-06 ENCOUNTER — OFFICE VISIT (OUTPATIENT)
Dept: ORTHOPEDICS CLINIC | Facility: CLINIC | Age: 83
End: 2023-11-06
Payer: MEDICARE

## 2023-11-06 VITALS — HEIGHT: 61.5 IN | BODY MASS INDEX: 33.36 KG/M2 | WEIGHT: 179 LBS

## 2023-11-06 DIAGNOSIS — M16.12 PRIMARY OSTEOARTHRITIS OF LEFT HIP: Primary | ICD-10-CM

## 2023-11-06 PROCEDURE — 99213 OFFICE O/P EST LOW 20 MIN: CPT | Performed by: ORTHOPAEDIC SURGERY

## 2023-11-06 NOTE — TELEPHONE ENCOUNTER
1st Attempt to Call- Unable to leave voicemail, voicemail box full. LOV:  9/22/2023 Trinidad Booker  LOV PLAN:   \"Symptoms seem to be primarily coming from the hip. We discussed treatment options for left hip osteoarthritis, which includes physical therapy, NSAIDs, duloxetine, hip joint injection, and finally joint replacement. She has done physical therapy and is not seeing benefit from diclofenac; she will stop diclofenac and trial naproxen 500mg BID PRN pain. No improvement with duloxetine; that has already been discontinued. Last hip joint injection was about 1 year ago. We could consider repeating that. She may also consider following up with orthopedics to discuss joint replacement or other options amenable to her. She will either follow up after she has seen orthopedics, or she may contact the clinic if no relief from naproxen and if she would like to get set up for left hip joint injection under fluoroscopy. \"

## 2023-11-27 DIAGNOSIS — I10 WHITE COAT SYNDROME WITH DIAGNOSIS OF HYPERTENSION: Chronic | ICD-10-CM

## 2023-11-28 RX ORDER — HYDRALAZINE HYDROCHLORIDE 25 MG/1
25 TABLET, FILM COATED ORAL 3 TIMES DAILY
Qty: 270 TABLET | Refills: 0 | Status: SHIPPED | OUTPATIENT
Start: 2023-11-28

## 2023-11-28 NOTE — TELEPHONE ENCOUNTER
Protocol passed     Hydralazine 25mg     LOV: 8/3/23   RTC: none noted   Filled: 8/29/23 # 270 0 refills   Labs: 7/19/23   Future Appointments   Date Time Provider Isiah Anne   11/29/2023 12:[place] [address] [address] [place] [address] SUB GI   11/29/2023  1:00 PM Iraj Dural, DO [id number] [place] [address] SUB GI   12/19/2023  9:00 AM [facility] RM1 (1.5T WIDE) [facility] MRI EDW [facility]   12/26/2023 11:45 AM Mikki Jewell MD [facility] EC Nap 4

## 2023-11-29 PROBLEM — D12.5 BENIGN NEOPLASM OF SIGMOID COLON: Status: ACTIVE | Noted: 2023-11-29

## 2023-11-29 PROBLEM — D50.9 IRON DEFICIENCY ANEMIA, UNSPECIFIED: Status: ACTIVE | Noted: 2023-11-29

## 2023-11-29 PROBLEM — R63.4 LOSS OF WEIGHT: Status: ACTIVE | Noted: 2023-11-29

## 2023-11-29 PROBLEM — D50.9 ANEMIA, IRON DEFICIENCY: Status: ACTIVE | Noted: 2023-11-29

## 2023-12-01 NOTE — TELEPHONE ENCOUNTER
Failed protocol     Last refill:  diclofenac 50 MG Oral Tab EC (Discontinued) 180 tablet 0 8/21/2023 9/22/2023   Sig:   Take 1 tablet (50 mg total) by mouth 2 (two) times daily.          LOV:   08/03/2023 Dr La Membreno RTC  FOV scheduled 01/02/2024

## 2023-12-19 ENCOUNTER — HOSPITAL ENCOUNTER (OUTPATIENT)
Dept: MRI IMAGING | Age: 83
Discharge: HOME | End: 2023-12-19
Attending: SURGERY
Payer: MEDICARE

## 2023-12-19 DIAGNOSIS — N28.89 RENAL MASS: ICD-10-CM

## 2023-12-19 PROCEDURE — 74183 MRI ABD W/O CNTR FLWD CNTR: CPT | Performed by: SURGERY

## 2023-12-19 PROCEDURE — A9575 INJ GADOTERATE MEGLUMI 0.1ML: HCPCS

## 2023-12-19 RX ORDER — GADOTERATE MEGLUMINE 376.9 MG/ML
20 INJECTION INTRAVENOUS
Status: COMPLETED | OUTPATIENT
Start: 2023-12-19 | End: 2023-12-19

## 2023-12-19 RX ORDER — FENOFIBRATE 160 MG/1
TABLET ORAL
Qty: 90 TABLET | Refills: 0 | Status: SHIPPED | OUTPATIENT
Start: 2023-12-19

## 2023-12-19 RX ADMIN — GADOTERATE MEGLUMINE 16 ML: 376.9 INJECTION INTRAVENOUS at 10:05:00

## 2023-12-19 NOTE — TELEPHONE ENCOUNTER
Protocol passed     Fenofibrate 160mg     LOV: 8/3/23   RTC: 4 weeks  Filled: 9/19/23 # 90 1 refill   Labs: 7/19/23   Future Appointments   Date Time Provider Isiah Rordíguez   12/26/2023 11:45 AM Soniya Quezada MD [facility] EC Nap 4   1/2/2024 10:30 AM Kentrell Rivas MD EMG 8 EMG [place]

## 2023-12-26 ENCOUNTER — OFFICE VISIT (OUTPATIENT)
Dept: SURGERY | Facility: CLINIC | Age: 83
End: 2023-12-26

## 2023-12-26 DIAGNOSIS — N28.89 RENAL MASS: Primary | ICD-10-CM

## 2023-12-26 PROCEDURE — 99214 OFFICE O/P EST MOD 30 MIN: CPT | Performed by: SURGERY

## 2023-12-26 RX ORDER — DULOXETIN HYDROCHLORIDE 20 MG/1
20 CAPSULE, DELAYED RELEASE ORAL DAILY
COMMUNITY
Start: 2023-11-18

## 2023-12-26 NOTE — PROGRESS NOTES
Urology Clinic Note    Primary Care Provider:  Ashtyn Blackmon MD     Chief Complaint:   Renal mass followup     HPI:   Peter Murray is a 81year old female with history of HTN, OA, HTN seen by Urology Gabo GREGORY on 3/17/23 for a renal mass. CT 2/16/23 showed a 2.4 cm left upper pole renal mass suspicious for RCC. MRI on 6/1/2023 showed a 2.7 cm left upper pole anterior, partially exophytic complex cystic renal mass. In comparison, this was 2.5 cm on CT scan from 2/16/2023. It is just above the superior margin of the left renal vein. I saw her last on 6/19/2023 and at that time we discussed different options. She elected to proceed with continued surveillance given minimal growth of the mass and age/comorbidities. Repeat MRI 12/19/2023 shows no interval change in size of the mass. She is doing well at this time. She denies gross hematuria. She is having some left lower quadrant pain and swelling that radiates down the left leg. I explained this is likely unrelated to her small left renal mass.     History:     Past Medical History:   Diagnosis Date    Abdominal distention     Abdominal pain     Abnormal stress test 03/18/2014    Arthritis     Arthritis of hand 04/18/2018    Back pain     Bloating     carpal tunnel syndrome right wrist   Global 11/23/2015 08/26/2015    Cataract 09/09/2014    Constipation     Eye disease     Frequent use of laxatives     High cholesterol     Left hip pain 02/27/2013    Leg swelling     Osteoarthritis of left shoulder, unspecified osteoarthritis type 01/13/2016    Osteoarthrosis, unspecified whether generalized or localized, unspecified site     Other and unspecified hyperlipidemia     Pain in joints     PONV (postoperative nausea and vomiting)     slow to awaken    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     Problems with swallowing     S/P total knee replacement using cement, left 01/13/2016    Senile osteoporosis     Unspecified essential hypertension Urethral stricture due to infection     Urinary incontinence     Weakness of right hand 04/18/2018    Wears glasses        Past Surgical History:   Procedure Laterality Date    APPENDECTOMY      CARDIAC CATHETERIZATION  11/18/2013    HEMORRHOIDECTOMY,INT/EXT,SIMPLE      HYSTERECTOMY  01/01/1984    YANELI/BSO    KNEE REPLACEMENT SURGERY  11/03/2009    RIGHT    KNEE REPLACEMENT SURGERY  11/11/2008    LEFT    OOPHORECTOMY      OTHER SURGICAL HISTORY Right 08/25/2015    Procedure: WRIST CARPAL TUNNEL RELEASE;  Surgeon: Hui Gómez MD;  Location: [facility] MAIN OR    TONSILLECTOMY         Family History   Problem Relation Age of Onset    Breast Cancer Daughter 40    Diabetes Mother     Hypertension Mother     Diabetes Other         sibling, family history of        Social History     Socioeconomic History    Marital status:    Tobacco Use    Smoking status: Never    Smokeless tobacco: Never   Vaping Use    Vaping Use: Never used   Substance and Sexual Activity    Alcohol use: Not Currently     Alcohol/week: 0.0 standard drinks of alcohol    Drug use: No   Other Topics Concern    Caffeine Concern Yes     Comment: 1 cup coffee/day    Exercise No   Social History Narrative    The patient does not use an assistive device. .      The patient does live in a home with stairs. Medications (Active prior to today's visit):  Current Outpatient Medications   Medication Sig Dispense Refill    DULoxetine 20 MG Oral Cap DR Particles Take 1 capsule (20 mg total) by mouth daily. FENOFIBRATE 160 MG Oral Tab Take 1 tablet by mouth once daily 90 tablet 0    hydrALAZINE 25 MG Oral Tab Take 1 tablet (25 mg total) by mouth 3 (three) times daily. 270 tablet 0    amLODIPine 5 MG Oral Tab Take 1 tablet (5 mg total) by mouth 2 (two) times daily.  180 tablet 0    METOPROLOL TARTRATE 100 MG Oral Tab Take 1 tablet by mouth twice daily 180 tablet 0    metFORMIN HCl 1000 MG Oral Tab Take 1 tablet (1,000 mg total) by mouth 2 (two) times daily with meals. 325 tablet 0    folic acid 1 MG Oral Tab Take 1 tablet (1 mg total) by mouth daily. 90 tablet 0    losartan-hydroCHLOROthiazide 100-25 MG Oral Tab Take 1 tablet by mouth daily. 90 tablet 0    naproxen 500 MG Oral Tab EC Take 1 tablet (500 mg total) by mouth 2 (two) times daily with meals. 60 tablet 0    diazePAM 2 MG Oral Tab Take 1 tablet (2 mg total) by mouth every 12 (twelve) hours as needed. Multiple Vitamins-Minerals (ICAPS AREDS FORMULA) Oral Tab Take 1 each by mouth 2 (two) times daily. Cholecalciferol (VITAMIN D3) 1000 units Oral Cap Take 1 tablet by mouth daily. Omega 3 1000 MG Oral Cap Take 3 capsules by mouth daily. 90 capsule 3    ASPIRIN 81 MG OR TBEC 1 TABLET DAILY         Allergies: Allergies   Allergen Reactions    Cortisone UNKNOWN     Burning sensation to area injected. Latex RASH       Review of Systems:   A comprehensive 10-point review of systems was completed. Pertinent positives and negatives are noted in the the HPI. Physical Exam:   CONSTITUTIONAL: Well developed, well nourished, in no acute distress  NEUROLOGIC: Alert and oriented  HEAD: Normocephalic, atraumatic  EYES: Sclera non-icteric  ENT: Hearing intact, moist mucous membranes  NECK: No obvious goiter or masses  RESPIRATORY: Normal respiratory effort  SKIN: No evident rashes  ABDOMEN: Soft, non-tender, non-distended    Assessment & Plan:   Orpah Collet is a 81year old female with history of HTN, OA, HTN seen by Urology Remi GREGORY on 3/17/23 for a renal mass. CT 2/16/23 showed a 2.4 cm left upper pole renal mass suspicious for RCC. MRI on 6/1/2023 showed a 2.7 cm left upper pole anterior, partially exophytic complex cystic renal mass. In comparison, this was 2.5 cm on CT scan from 2/16/2023. It is just above the superior margin of the left renal vein. I saw her last on 6/19/2023 and at that time we discussed different options.   She elected to proceed with continued surveillance given minimal growth of the mass and age/comorbidities. Repeat MRI 12/19/2023 shows no interval change in size of the mass. She is doing well at this time. She denies gross hematuria. She is having some left lower quadrant pain and swelling that radiates down the left leg. I explained this is likely unrelated to her small left renal mass. Given no interval growth and age/co-morbities we will continue with active surveillance and repeat imaging in 1 year. - Abdominal MRI in 1 year    In total, 30 minutes were spent on this patient encounter (including chart review, patient history, physical, and counseling, documentation, and communication). Puma Bergeron.  Evelyn Acuña MD  Staff Urologist  [place] [address]  Office: [phone]

## 2024-01-09 ENCOUNTER — OFFICE VISIT (OUTPATIENT)
Dept: INTERNAL MEDICINE CLINIC | Facility: CLINIC | Age: 84
End: 2024-01-09
Payer: MEDICARE

## 2024-01-09 VITALS
DIASTOLIC BLOOD PRESSURE: 62 MMHG | TEMPERATURE: 97 F | HEART RATE: 57 BPM | HEIGHT: 61.5 IN | SYSTOLIC BLOOD PRESSURE: 134 MMHG | BODY MASS INDEX: 30.94 KG/M2 | WEIGHT: 166 LBS | RESPIRATION RATE: 16 BRPM | OXYGEN SATURATION: 98 %

## 2024-01-09 DIAGNOSIS — D50.8 OTHER IRON DEFICIENCY ANEMIA: Primary | ICD-10-CM

## 2024-01-09 PROBLEM — R93.5 ABNORMAL ABDOMINAL CT SCAN: Status: RESOLVED | Noted: 2023-02-21 | Resolved: 2024-01-09

## 2024-01-09 PROBLEM — D50.9 IRON DEFICIENCY ANEMIA, UNSPECIFIED: Status: RESOLVED | Noted: 2023-11-29 | Resolved: 2024-01-09

## 2024-01-09 PROCEDURE — 99213 OFFICE O/P EST LOW 20 MIN: CPT | Performed by: INTERNAL MEDICINE

## 2024-01-09 RX ORDER — LOSARTAN POTASSIUM AND HYDROCHLOROTHIAZIDE 25; 100 MG/1; MG/1
1 TABLET ORAL DAILY
Qty: 90 TABLET | Refills: 0 | Status: SHIPPED | OUTPATIENT
Start: 2024-01-09

## 2024-01-09 RX ORDER — FOLIC ACID 1 MG/1
1 TABLET ORAL DAILY
Qty: 90 TABLET | Refills: 0 | Status: SHIPPED | OUTPATIENT
Start: 2024-01-09

## 2024-01-09 NOTE — TELEPHONE ENCOUNTER
Name from pharmacy: Folic Acid 1 MG Oral Tablet         Will file in chart as: FOLIC ACID 1 MG Oral Tab    Sig: Take 1 tablet by mouth once daily    Disp: 90 tablet    Refills: 0    Start: 1/9/2024    Class: Normal    Non-formulary    Last ordered: 3 months ago (10/11/2023) by Karlos Guzmán MD    Last refill: 10/11/2023    Rx #: [id number]        Name from pharmacy: Losartan Potassium-HCTZ 100-25 MG Oral Tablet         Will file in chart as: LOSARTAN-HYDROCHLOROTHIAZIDE 100-25 MG Oral Tab    Sig: Take 1 tablet by mouth once daily    Disp: 90 tablet    Refills: 0    Start: 1/9/2024    Class: Normal    Last ordered: 3 months ago (10/11/2023) by Karlos Guzmán MD    Last refill: 10/11/2023    Rx #: [id number]    Hypertension Medications Protocol Ddbaub1901/09/2024 11:40 AM   Protocol Details CMP or BMP in past 12 months    Last serum creatinine< 2.0    Appointment in past 6 or next 3 months       Name from pharmacy: Diclofenac Sodium 50 MG Oral Tablet Delayed Release         Will file in chart as: DICLOFENAC 50 MG Oral Tab EC    The original prescription was discontinued on 9/22/2023 by Aleks Russell DO. Renewing this prescription may not be appropriate.    Sig: Take 1 tablet by mouth twice daily    Disp: 180 tablet    Refills: 0    Start: 1/9/2024    Class: Normal    Last ordered: 4 months ago (8/21/2023) by Karlos Guzmán MD    Last refill: 8/21/2023    Rx #: [id number]       To be filled at: [address] [phone], [phone]

## 2024-01-09 NOTE — PROGRESS NOTES
Lu Nicole  3/4/1940 is a 83 year old female.    Chief Complaint   Patient presents with    Post-Op     Colonoscopy 23       HPI:   Patient had an upper and lower endoscopy scheduled to have an MRI enterography along with some additional blood test with Dr. Narayan Patient here to update me  Current Outpatient Medications   Medication Sig Dispense Refill    DULoxetine 20 MG Oral Cap DR Particles Take 1 capsule (20 mg total) by mouth daily.      FENOFIBRATE 160 MG Oral Tab Take 1 tablet by mouth once daily 90 tablet 0    hydrALAZINE 25 MG Oral Tab Take 1 tablet (25 mg total) by mouth 3 (three) times daily. 270 tablet 0    amLODIPine 5 MG Oral Tab Take 1 tablet (5 mg total) by mouth 2 (two) times daily. 180 tablet 0    METOPROLOL TARTRATE 100 MG Oral Tab Take 1 tablet by mouth twice daily 180 tablet 0    metFORMIN HCl 1000 MG Oral Tab Take 1 tablet (1,000 mg total) by mouth 2 (two) times daily with meals. 180 tablet 0    folic acid 1 MG Oral Tab Take 1 tablet (1 mg total) by mouth daily. 90 tablet 0    losartan-hydroCHLOROthiazide 100-25 MG Oral Tab Take 1 tablet by mouth daily. 90 tablet 0    naproxen 500 MG Oral Tab EC Take 1 tablet (500 mg total) by mouth 2 (two) times daily with meals. 60 tablet 0    diazePAM 2 MG Oral Tab Take 1 tablet (2 mg total) by mouth every 12 (twelve) hours as needed.      Multiple Vitamins-Minerals (ICAPS AREDS FORMULA) Oral Tab Take 1 each by mouth 2 (two) times daily.      Cholecalciferol (VITAMIN D3) 1000 units Oral Cap Take 1 tablet by mouth daily.      Omega 3 1000 MG Oral Cap Take 3 capsules by mouth daily. 90 capsule 3    ASPIRIN 81 MG OR TBEC 1 TABLET DAILY        Past Medical History:   Diagnosis Date    Abdominal distention     Abdominal pain     Abnormal stress test 2014    Arthritis     Arthritis of hand 2018    Back pain     Bloating     carpal tunnel syndrome right wrist   Global 2015    Cataract 2014    Constipation     Eye  disease     Frequent use of laxatives     High cholesterol     Left hip pain 02/27/2013    Leg swelling     Osteoarthritis of left shoulder, unspecified osteoarthritis type 01/13/2016    Osteoarthrosis, unspecified whether generalized or localized, unspecified site     Other and unspecified hyperlipidemia     Pain in joints     PONV (postoperative nausea and vomiting)     slow to awaken    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     Problems with swallowing     S/P total knee replacement using cement, left 01/13/2016    Senile osteoporosis     Unspecified essential hypertension     Urethral stricture due to infection     Urinary incontinence     Weakness of right hand 04/18/2018    Wears glasses       Social History:  Social History     Socioeconomic History    Marital status:    Tobacco Use    Smoking status: Never    Smokeless tobacco: Never   Vaping Use    Vaping Use: Never used   Substance and Sexual Activity    Alcohol use: Not Currently     Alcohol/week: 0.0 standard drinks of alcohol    Drug use: No   Other Topics Concern    Caffeine Concern Yes     Comment: 1 cup coffee/day    Exercise No   Social History Narrative    The patient does not use an assistive device..      The patient does live in a home with stairs.        REVIEW OF SYSTEMS:   na        EXAM:   /62 (BP Location: Left arm, Patient Position: Sitting, Cuff Size: adult)   Pulse 57   Temp 97 °F (36.1 °C) (Temporal)   Resp 16   Ht 5' 1.5\" (1.562 m)   Wt 166 lb (75.3 kg)   SpO2 98%   BMI 30.86 kg/m²     na      ASSESSMENT AND PLAN:   Lu was seen today for post-op.    Diagnoses and all orders for this visit:    Other iron deficiency anemia         Patient Instructions   Patient to proceed with tests as outlined by the gastroenterologist.  The last step will be to get a hematology evaluation.  Will set up a complete physical end of March   The patient indicates understanding of these issues and agrees to the plan.  The  patient is asked to Return in about 2 months (around 3/9/2024), or cpx.  .      Karlos Guzmán MD

## 2024-01-09 NOTE — PATIENT INSTRUCTIONS
Patient to proceed with tests as outlined by the gastroenterologist.  The last step will be to get a hematology evaluation.  Will set up a complete physical end of March

## 2024-01-23 DIAGNOSIS — I10 ESSENTIAL HYPERTENSION, BENIGN: ICD-10-CM

## 2024-01-23 RX ORDER — METOPROLOL TARTRATE 100 MG/1
100 TABLET ORAL 2 TIMES DAILY
Qty: 180 TABLET | Refills: 0 | Status: SHIPPED | OUTPATIENT
Start: 2024-01-23

## 2024-01-23 NOTE — TELEPHONE ENCOUNTER
Name from pharmacy: Metoprolol Tartrate 100 MG Oral Tablet         Will file in chart as: METOPROLOL TARTRATE 100 MG Oral Tab    Sig: Take 1 tablet by mouth twice daily    Disp: 180 tablet    Refills: 0    Start: 1/23/2024    Class: Normal    Non-formulary For: Essential hypertension, benign    Last ordered: 3 months ago (10/24/2023) by Karlos Guzmán MD    Last refill: 10/24/2023    Rx #: [id number]    Hypertension Medications Protocol Runfxm4401/23/2024 11:35 AM   Protocol Details CMP or BMP in past 12 months    Last serum creatinine< 2.0    Appointment in past 6 or next 3 months       Name from pharmacy: metFORMIN HCl 1000 MG Oral Tablet         Will file in chart as: METFORMIN HCL 1000 MG Oral Tab    Sig: TAKE 1 TABLET BY MOUTH TWICE DAILY WITH MEALS    Disp: 180 tablet    Refills: 0    Start: 1/23/2024    Class: Normal    Non-formulary    Last ordered: 3 months ago (10/23/2023) by Karlos Guzmán MD    Last refill: 10/23/2023    Rx #: [id number]    Diabetic Medication Protocol [id number]/23/2024 11:35 AM   Protocol Details HgBA1C procedure resulted in past 6 months    Last HgBA1C < 7.5    Microalbumin procedure in past 12 months or taking ACE/ARB    Appointment in past 6 or next 3 months      To be filled at: [facility] [address] [place] [address] [phone], [phone]

## 2024-01-30 DIAGNOSIS — I10 ESSENTIAL HYPERTENSION, BENIGN: ICD-10-CM

## 2024-01-31 ENCOUNTER — LAB ENCOUNTER (OUTPATIENT)
Dept: LAB | Age: 84
End: 2024-01-31
Attending: INTERNAL MEDICINE
Payer: MEDICARE

## 2024-01-31 ENCOUNTER — TELEPHONE (OUTPATIENT)
Dept: INTERNAL MEDICINE CLINIC | Facility: CLINIC | Age: 84
End: 2024-01-31

## 2024-01-31 DIAGNOSIS — K29.40 ATROPHIC GASTRITIS WITHOUT HEMORRHAGE: ICD-10-CM

## 2024-01-31 DIAGNOSIS — Z00.00 LABORATORY EXAMINATION ORDERED AS PART OF A ROUTINE GENERAL MEDICAL EXAMINATION: Primary | ICD-10-CM

## 2024-01-31 LAB
BASOPHILS # BLD AUTO: 0.06 X10(3) UL (ref 0–0.2)
BASOPHILS NFR BLD AUTO: 1 %
DEPRECATED HBV CORE AB SER IA-ACNC: 33.5 NG/ML
EOSINOPHIL # BLD AUTO: 0.26 X10(3) UL (ref 0–0.7)
EOSINOPHIL NFR BLD AUTO: 4.2 %
ERYTHROCYTE [DISTWIDTH] IN BLOOD BY AUTOMATED COUNT: 16 %
HCT VFR BLD AUTO: 32 %
HGB BLD-MCNC: 10.7 G/DL
IMM GRANULOCYTES # BLD AUTO: 0.02 X10(3) UL (ref 0–1)
IMM GRANULOCYTES NFR BLD: 0.3 %
IRON SATN MFR SERPL: 12 %
IRON SERPL-MCNC: 63 UG/DL
LYMPHOCYTES # BLD AUTO: 1.68 X10(3) UL (ref 1–4)
LYMPHOCYTES NFR BLD AUTO: 26.9 %
MCH RBC QN AUTO: 27.3 PG (ref 26–34)
MCHC RBC AUTO-ENTMCNC: 33.4 G/DL (ref 31–37)
MCV RBC AUTO: 81.6 FL
MONOCYTES # BLD AUTO: 0.7 X10(3) UL (ref 0.1–1)
MONOCYTES NFR BLD AUTO: 11.2 %
NEUTROPHILS # BLD AUTO: 3.52 X10 (3) UL (ref 1.5–7.7)
NEUTROPHILS # BLD AUTO: 3.52 X10(3) UL (ref 1.5–7.7)
NEUTROPHILS NFR BLD AUTO: 56.4 %
PLATELET # BLD AUTO: 328 10(3)UL (ref 150–450)
RBC # BLD AUTO: 3.92 X10(6)UL
TIBC SERPL-MCNC: 508 UG/DL (ref 240–450)
TRANSFERRIN SERPL-MCNC: 341 MG/DL (ref 200–360)
VIT B12 SERPL-MCNC: 698 PG/ML (ref 193–986)
WBC # BLD AUTO: 6.2 X10(3) UL (ref 4–11)

## 2024-01-31 PROCEDURE — 36415 COLL VENOUS BLD VENIPUNCTURE: CPT

## 2024-01-31 PROCEDURE — 85025 COMPLETE CBC W/AUTO DIFF WBC: CPT

## 2024-01-31 PROCEDURE — 83550 IRON BINDING TEST: CPT

## 2024-01-31 PROCEDURE — 83516 IMMUNOASSAY NONANTIBODY: CPT

## 2024-01-31 PROCEDURE — 82728 ASSAY OF FERRITIN: CPT

## 2024-01-31 PROCEDURE — 82607 VITAMIN B-12: CPT

## 2024-01-31 PROCEDURE — 83540 ASSAY OF IRON: CPT

## 2024-01-31 PROCEDURE — 86340 INTRINSIC FACTOR ANTIBODY: CPT

## 2024-01-31 RX ORDER — AMLODIPINE BESYLATE 5 MG/1
5 TABLET ORAL 2 TIMES DAILY
Qty: 180 TABLET | Refills: 0 | Status: SHIPPED | OUTPATIENT
Start: 2024-01-31

## 2024-01-31 NOTE — TELEPHONE ENCOUNTER
LOV: 1/9/24   RTC: 2 months  Labs: 2/9/23   Filled: 10/31/23   Future Appointments   Date Time Provider Department Center   2/7/2024 10:00 AM  MR RM2 (1.5T WIDE)  MRI [facility]   2/14/2024  9:40 AM Mateus, PADMINI Crocker S[facility] SUB GI   4/1/2024 12:00 PM Karlos Guzmán MD EMG 8 EMG [place]   11/29/2024 10:00 AM  MR RM3 (3T WIDE)  MRI [facility]   12/26/2024 10:00 AM Callum Zambrano MD [id number] EC Nap 4

## 2024-01-31 NOTE — TELEPHONE ENCOUNTER
Patient and daughter came into the clinic stating that they were advised by  that once pt had completed labs from Dr Gonzalez, VM would place in more lab orders,     Please advise.

## 2024-02-01 LAB — PARIETAL CELL AB: 58 UNITS

## 2024-02-02 LAB — INTRINSIC FACTOR ABS: 1 AU/ML

## 2024-02-07 ENCOUNTER — HOSPITAL ENCOUNTER (OUTPATIENT)
Dept: MRI IMAGING | Facility: HOSPITAL | Age: 84
Discharge: HOME | End: 2024-02-07
Attending: INTERNAL MEDICINE
Payer: MEDICARE

## 2024-02-07 DIAGNOSIS — R63.4 WEIGHT LOSS: ICD-10-CM

## 2024-02-07 DIAGNOSIS — D50.9 IRON DEFICIENCY ANEMIA, UNSPECIFIED IRON DEFICIENCY ANEMIA TYPE: ICD-10-CM

## 2024-02-07 PROCEDURE — A9575 INJ GADOTERATE MEGLUMI 0.1ML: HCPCS | Performed by: INTERNAL MEDICINE

## 2024-02-07 PROCEDURE — 74183 MRI ABD W/O CNTR FLWD CNTR: CPT | Performed by: INTERNAL MEDICINE

## 2024-02-07 PROCEDURE — 72197 MRI PELVIS W/O & W/DYE: CPT | Performed by: INTERNAL MEDICINE

## 2024-02-07 RX ORDER — GADOTERATE MEGLUMINE 376.9 MG/ML
15 INJECTION INTRAVENOUS
Status: COMPLETED | OUTPATIENT
Start: 2024-02-07 | End: 2024-02-07

## 2024-02-07 RX ADMIN — GADOTERATE MEGLUMINE 15 ML: 376.9 INJECTION INTRAVENOUS at 10:50:00

## 2024-02-07 NOTE — IMAGING NOTE
Pt verified name and  and allergies.  Pt denies glucagonoma, insulinoma, pheochromocytosis, small bowel obstruction history.  Pt sts she has DM2, treated oral diabetes meds, advised glucagon may raise blood glucose level temporarily. IV access patent with good blood return.  Glucagon administered per protocol.  Pt tolerated well, denies nausea.

## 2024-02-28 DIAGNOSIS — I10 WHITE COAT SYNDROME WITH DIAGNOSIS OF HYPERTENSION: Chronic | ICD-10-CM

## 2024-02-29 RX ORDER — HYDRALAZINE HYDROCHLORIDE 25 MG/1
25 TABLET, FILM COATED ORAL 3 TIMES DAILY
Qty: 270 TABLET | Refills: 0 | Status: SHIPPED | OUTPATIENT
Start: 2024-02-29

## 2024-02-29 NOTE — TELEPHONE ENCOUNTER
LOV: 1/9/24   RTC: 2 months  Filled:11/28/23 #270   Labs: 1/31/24   Future Appointments   Date Time Provider Department Center   3/22/2024  9:00 AM [facility] MRI RM1 (1.5T WIDE) [facility] MRI EDW [facility]   4/1/2024 12:00 PM Karlos Guzmán MD EMG 8 EMG [place]   8/19/2024  9:40 AM Lizzette Ignacio APRN [facility] SUB GI   11/29/2024 10:00 AM  MR RM3 (3T WIDE)  MRI [facility]   12/26/2024 10:00 AM Callum Zambrano MD [id number] EC Nap 4

## 2024-03-06 ENCOUNTER — LAB ENCOUNTER (OUTPATIENT)
Dept: LAB | Age: 84
End: 2024-03-06
Payer: MEDICARE

## 2024-03-06 DIAGNOSIS — K74.60 CIRRHOSIS OF LIVER WITHOUT ASCITES, UNSPECIFIED HEPATIC CIRRHOSIS TYPE (HCC): ICD-10-CM

## 2024-03-06 LAB
A1AT SERPL-MCNC: 170 MG/DL (ref 90–200)
AFP-TM SERPL-MCNC: 4.1 NG/ML (ref ?–8)
ALBUMIN SERPL-MCNC: 3.5 G/DL (ref 3.4–5)
ALBUMIN/GLOB SERPL: 1.1 {RATIO} (ref 1–2)
ALP LIVER SERPL-CCNC: 36 U/L
ALT SERPL-CCNC: 21 U/L
ANION GAP SERPL CALC-SCNC: 3 MMOL/L (ref 0–18)
AST SERPL-CCNC: 22 U/L (ref 15–37)
BASOPHILS # BLD AUTO: 0.04 X10(3) UL (ref 0–0.2)
BASOPHILS NFR BLD AUTO: 0.6 %
BILIRUB SERPL-MCNC: 0.4 MG/DL (ref 0.1–2)
BUN BLD-MCNC: 33 MG/DL (ref 9–23)
CALCIUM BLD-MCNC: 10 MG/DL (ref 8.5–10.1)
CHLORIDE SERPL-SCNC: 107 MMOL/L (ref 98–112)
CO2 SERPL-SCNC: 27 MMOL/L (ref 21–32)
CREAT BLD-MCNC: 1.27 MG/DL
EGFRCR SERPLBLD CKD-EPI 2021: 42 ML/MIN/1.73M2 (ref 60–?)
EOSINOPHIL # BLD AUTO: 0.26 X10(3) UL (ref 0–0.7)
EOSINOPHIL NFR BLD AUTO: 3.8 %
ERYTHROCYTE [DISTWIDTH] IN BLOOD BY AUTOMATED COUNT: 16.3 %
FASTING STATUS PATIENT QL REPORTED: NO
GLOBULIN PLAS-MCNC: 3.1 G/DL (ref 2.8–4.4)
GLUCOSE BLD-MCNC: 88 MG/DL (ref 70–99)
HAV AB SER QL IA: NONREACTIVE
HBV CORE AB SERPL QL IA: NONREACTIVE
HBV SURFACE AB SER QL: NONREACTIVE
HBV SURFACE AB SERPL IA-ACNC: <3.1 MIU/ML
HBV SURFACE AG SER-ACNC: 0.49 [IU]/L
HBV SURFACE AG SERPL QL IA: NONREACTIVE
HCT VFR BLD AUTO: 28.3 %
HCV AB SERPL QL IA: NONREACTIVE
HGB BLD-MCNC: 9.3 G/DL
IMM GRANULOCYTES # BLD AUTO: 0.02 X10(3) UL (ref 0–1)
IMM GRANULOCYTES NFR BLD: 0.3 %
INR BLD: 1.03 (ref 0.8–1.2)
LYMPHOCYTES # BLD AUTO: 1.77 X10(3) UL (ref 1–4)
LYMPHOCYTES NFR BLD AUTO: 26 %
MCH RBC QN AUTO: 27.4 PG (ref 26–34)
MCHC RBC AUTO-ENTMCNC: 32.9 G/DL (ref 31–37)
MCV RBC AUTO: 83.5 FL
MONOCYTES # BLD AUTO: 0.71 X10(3) UL (ref 0.1–1)
MONOCYTES NFR BLD AUTO: 10.4 %
NEUTROPHILS # BLD AUTO: 4.02 X10 (3) UL (ref 1.5–7.7)
NEUTROPHILS # BLD AUTO: 4.02 X10(3) UL (ref 1.5–7.7)
NEUTROPHILS NFR BLD AUTO: 58.9 %
OSMOLALITY SERPL CALC.SUM OF ELEC: 291 MOSM/KG (ref 275–295)
PLATELET # BLD AUTO: 386 10(3)UL (ref 150–450)
POTASSIUM SERPL-SCNC: 4 MMOL/L (ref 3.5–5.1)
PROT SERPL-MCNC: 6.6 G/DL (ref 6.4–8.2)
PROTHROMBIN TIME: 13.5 SECONDS (ref 11.6–14.8)
RBC # BLD AUTO: 3.39 X10(6)UL
SODIUM SERPL-SCNC: 137 MMOL/L (ref 136–145)
TSI SER-ACNC: 2.16 MIU/ML (ref 0.36–3.74)
WBC # BLD AUTO: 6.8 X10(3) UL (ref 4–11)

## 2024-03-06 PROCEDURE — 86803 HEPATITIS C AB TEST: CPT

## 2024-03-06 PROCEDURE — 82105 ALPHA-FETOPROTEIN SERUM: CPT

## 2024-03-06 PROCEDURE — 85610 PROTHROMBIN TIME: CPT

## 2024-03-06 PROCEDURE — 83516 IMMUNOASSAY NONANTIBODY: CPT

## 2024-03-06 PROCEDURE — 87340 HEPATITIS B SURFACE AG IA: CPT

## 2024-03-06 PROCEDURE — 84443 ASSAY THYROID STIM HORMONE: CPT

## 2024-03-06 PROCEDURE — 36415 COLL VENOUS BLD VENIPUNCTURE: CPT

## 2024-03-06 PROCEDURE — 86708 HEPATITIS A ANTIBODY: CPT

## 2024-03-06 PROCEDURE — 85025 COMPLETE CBC W/AUTO DIFF WBC: CPT

## 2024-03-06 PROCEDURE — 82103 ALPHA-1-ANTITRYPSIN TOTAL: CPT

## 2024-03-06 PROCEDURE — 86704 HEP B CORE ANTIBODY TOTAL: CPT

## 2024-03-06 PROCEDURE — 86706 HEP B SURFACE ANTIBODY: CPT

## 2024-03-06 PROCEDURE — 80053 COMPREHEN METABOLIC PANEL: CPT

## 2024-03-07 LAB
ACTIN SMOOTH MUSCLE AB: 13 UNITS
M2 MITOCHONDRIAL AB: <20 UNITS

## 2024-03-22 ENCOUNTER — HOSPITAL ENCOUNTER (OUTPATIENT)
Dept: MRI IMAGING | Age: 84
Discharge: HOME | End: 2024-03-22
Attending: INTERNAL MEDICINE
Payer: MEDICARE

## 2024-03-22 DIAGNOSIS — K83.8 DILATED BILE DUCT: ICD-10-CM

## 2024-03-22 PROCEDURE — 76376 3D RENDER W/INTRP POSTPROCES: CPT | Performed by: INTERNAL MEDICINE

## 2024-03-22 PROCEDURE — 74181 MRI ABDOMEN W/O CONTRAST: CPT | Performed by: INTERNAL MEDICINE

## 2024-04-01 ENCOUNTER — TELEPHONE (OUTPATIENT)
Dept: HEMATOLOGY/ONCOLOGY | Facility: HOSPITAL | Age: 84
End: 2024-04-01

## 2024-04-01 ENCOUNTER — OFFICE VISIT (OUTPATIENT)
Dept: INTERNAL MEDICINE CLINIC | Facility: CLINIC | Age: 84
End: 2024-04-01
Payer: MEDICARE

## 2024-04-01 VITALS
SYSTOLIC BLOOD PRESSURE: 136 MMHG | TEMPERATURE: 98 F | BODY MASS INDEX: 30.95 KG/M2 | RESPIRATION RATE: 14 BRPM | DIASTOLIC BLOOD PRESSURE: 82 MMHG | OXYGEN SATURATION: 96 % | HEART RATE: 52 BPM | WEIGHT: 168.19 LBS | HEIGHT: 62 IN

## 2024-04-01 DIAGNOSIS — Z00.00 ROUTINE GENERAL MEDICAL EXAMINATION AT A HEALTH CARE FACILITY: Primary | ICD-10-CM

## 2024-04-01 DIAGNOSIS — D64.9 ANEMIA DUE TO UNKNOWN MECHANISM: ICD-10-CM

## 2024-04-01 DIAGNOSIS — E78.1 HYPERTRIGLYCERIDEMIA: Chronic | ICD-10-CM

## 2024-04-01 DIAGNOSIS — I10 WHITE COAT SYNDROME WITH DIAGNOSIS OF HYPERTENSION: Chronic | ICD-10-CM

## 2024-04-01 NOTE — PROGRESS NOTES
Lu Nicole [facility] 3/4/1940 is a 84 year old female.    Chief Complaint   Patient presents with    Physical       HPI:   Cpx   Current Outpatient Medications   Medication Sig Dispense Refill    hydrALAZINE 25 MG Oral Tab Take 1 tablet (25 mg total) by mouth 3 (three) times daily. 270 tablet 0    AMLODIPINE 5 MG Oral Tab Take 1 tablet by mouth twice daily 180 tablet 0    metoprolol tartrate 100 MG Oral Tab Take 1 tablet (100 mg total) by mouth 2 (two) times daily. 180 tablet 0    metFORMIN HCl 1000 MG Oral Tab Take 1 tablet (1,000 mg total) by mouth 2 (two) times daily with meals. 180 tablet 0    folic acid 1 MG Oral Tab Take 1 tablet (1 mg total) by mouth daily. 90 tablet 0    losartan-hydroCHLOROthiazide 100-25 MG Oral Tab Take 1 tablet by mouth daily. 90 tablet 0    diclofenac 50 MG Oral Tab EC Take 1 tablet (50 mg total) by mouth 2 (two) times daily. 180 tablet 0    DULoxetine 20 MG Oral Cap DR Particles Take 1 capsule (20 mg total) by mouth daily.      FENOFIBRATE 160 MG Oral Tab Take 1 tablet by mouth once daily 90 tablet 0    naproxen 500 MG Oral Tab EC Take 1 tablet (500 mg total) by mouth 2 (two) times daily with meals. 60 tablet 0    diazePAM 2 MG Oral Tab Take 1 tablet (2 mg total) by mouth every 12 (twelve) hours as needed.      Multiple Vitamins-Minerals (ICAPS AREDS FORMULA) Oral Tab Take 1 each by mouth 2 (two) times daily.      Cholecalciferol (VITAMIN D3) 1000 units Oral Cap Take 1 tablet by mouth daily.      Omega 3 1000 MG Oral Cap Take 3 capsules by mouth daily. 90 capsule 3    ASPIRIN 81 MG OR TBEC 1 TABLET DAILY        Past Medical History:   Diagnosis Date    Abdominal distention     Abdominal pain     Abnormal stress test 2014    Arthritis     Arthritis of hand 2018    Back pain     Bloating     carpal tunnel syndrome right wrist   Global 2015    Cataract 2014    Constipation     Eye disease     Frequent use of laxatives     High cholesterol     Left hip pain  02/27/2013    Leg swelling     Osteoarthritis of left shoulder, unspecified osteoarthritis type 01/13/2016    Osteoarthrosis, unspecified whether generalized or localized, unspecified site     Other and unspecified hyperlipidemia     Pain in joints     PONV (postoperative nausea and vomiting)     slow to awaken    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     Problems with swallowing     S/P total knee replacement using cement, left 01/13/2016    Senile osteoporosis     Unspecified essential hypertension     Urethral stricture due to infection     Urinary incontinence     Weakness of right hand 04/18/2018    Wears glasses       Social History:  Social History     Socioeconomic History    Marital status:    Tobacco Use    Smoking status: Never    Smokeless tobacco: Never   Vaping Use    Vaping Use: Never used   Substance and Sexual Activity    Alcohol use: Not Currently     Alcohol/week: 0.0 standard drinks of alcohol    Drug use: No   Other Topics Concern    Caffeine Concern Yes     Comment: 1 cup coffee/day    Exercise No   Social History Narrative    The patient does not use an assistive device..      The patient does live in a home with stairs.        REVIEW OF SYSTEMS:     General/Constitutional:   General able to do usual activities, good exercise tolerance, good general state of health, no fatigue, no fever, no weakness, no weight loss or gain .   HEENT/Neck:   Head no headache, no dizziness, no lightheadedness. Eyes Sees eye MD every 3 months Dr Lindsay - no redness, no drainage. Ears no earaches, no fullness, normal hearing, no tinnitus. Nose and Sinuses no recurrent colds, no discharge, no itching, no hay fever, no nosebleeds, no sinus trouble. Mouth and Pharynx no sore throats, no hoarseness. Neck no lumps, no goiter, no neck stiffness or pain.   Endocrine:   Diabetes yes . Thyroid disorder none.   Respiratory:   Patient denies chest pain, cough, MERCEDES (dyspnea on exertion), chest congestion,  blood-tinged sputum/wheezing. Breathing normal pattern .   Cardiovascular:   Patient denies chest pain, shortness of breath/rheumatic fever/murmur/dizzziness . Leg edema none. Orthopnea none. Palpitations none. PND (paroxsymal nocturnal dyspnea) none.  Does exercise daily -bike.  Patient does state that her blood pressure readings at home are decent she tends to get a little excited when she comes here  Gastrointestinal:   Patient denies abdominal pain, blood in stool, constipation, diarrhea, difficulty swallowing, change in stools, heartburn, nausea, vomiting voluntary weight changes and eating a lot more sensibly no heart burn noted.  Sees GI and has appt   Hematology:   Patient denies abnormal bleeding, easy bleeding, easy bruising. Enlarged lymph nodes none.   Women Only:   Patient denies breast pain, breast tenderness,breast lumps,discharge.axillary nodes . Breast lumps or discharge none. Mammogram up-to-date no   Genitourinary:   Patient denies difficulty urinating, frequent urination, hematuria. Dysuria none. Nocturia None. no Urinary frequency. Occ Urinary incontinence.   Musculoskeletal:   Arthritis No. Back pain no. Joint pain occ knees and shoulders -still continues to have discomfort in the left hip area did see the orthopedic physician who has referred her to physical medicine rehab for lumbar spine injection.  She has had 2 with minimal relief.  Does see  Dr. Vance for right hand pain   prn Joint stiffness no. Muscle weakness none.   Peripheral Vascular:   General no varicosities, no claudication.   Dermatologic:   Rash no.   Neurologic:   Patient denies dizziness, fainting, loss of consciousness, weakness. Memory loss none. Tingling/numbness none. Trouble with balance none.   Psychiatric:   Patient denies depression, hallucinations, memory loss. Anxiety none. Insomnia none.              EXAM:   /82 (BP Location: Right arm, Patient Position: Sitting, Cuff Size: adult)   Pulse 52   Temp 97.9 °F  (36.6 °C) (Temporal)   Resp 14   Ht 5' 2\" (1.575 m)   Wt 168 lb 3.2 oz (76.3 kg)   SpO2 96%   BMI 30.76 kg/m²     GENERAL:   Build: normal .   General Appearance: alert and oriented, pleasant.   HEENT:   Ear canals: normal.   EOM: within normal limit-conjunctiva normal.   Head: normocephalic.   Nasal septum: midline.   Nose: unremarkable.   Oral cavity: normal.   Pupils: normal, bilaterally .   Sclera: normal.   Turbinates: normal.   NECK:   Carotid bruit: none.   Cervical lymph nodes: unremarkable.   JVD: none.   Range of Motion: normal.   Thyroid: unremarkable.   HEART:   Clicks: absent .   Edema: none visible .   Heart sounds: normal.   Murmurs: none.   Rhythm: regular.   BREASTS:   Appearance: symmetrical.   Breast Mass: no palpable masses.   General: unremarkable.   Nipple Abnormality: none.   LUNGS:   Auscultation: clear .   Chest Shape: normal .   Percussion: normal.   Rales: no .   Respiratory effort: normal .   Rhonchi: no.   Wheezes: no.   ABDOMEN:   General: normal.   Hernia: absent.   Inguinal nodes: none.   Liver, Spleen: non-enlarged.   Rebound tenderness: absent.   Tenderness: absent .   EXTREMITIES:   Clubbing: none.   Cyanosis: absent .   Edema: none.   Pulses: present, bilateral.   Tremors: no.   Varicose veins: not present.   MUSCULOSKELETAL:   Cervical spines: normal.   L-S spines: normal.   Lower extremity joints: kassandra knee replaced.  Early hammertoe noted.  Left hip movements are pretty much normal except for mild tenderness  Upper extremity . Heberden and yazan nodes noted-mild OA both shoulders  -right hand per Dr. Vance.  Right shoulder movements significantly restricted secondary to extensive degenerative joint disease.  Patient not interested in any treatment  NEUROLOGICAL:   Babinski: negative/all reflexes are normal.   Cerebellar Testing grossly/intact: yes.   Gait: normal.  Does not seem to be in any significant distress  Motor: power-normal/tone -normal/co-ordination  normal/wasting -none/involuntary movements -none.   Sensory: normal sensation to all modalities.   LYMPHATICS:   Groin: no adenopathy .   Inguinal: no adenopathy.   Supraclavicular: none.   DERMATOLOGY:   Rash: no.                  ASSESSMENT AND PLAN:   Lu was seen today for physical.    Diagnoses and all orders for this visit:    Routine general medical examination at a health care facility  -     Oncology/Hematology Referral - [facility])  -     Assay, Thyroid Stim Hormone; Future  -     Lipid Panel; Future  -     Hemoglobin A1C; Future  -     B12 AND FOLATE; Future    Anemia due to unknown mechanism  -     Oncology/Hematology Referral - [facility])    Hypertriglyceridemia  -     EKG 12 Lead performed at [facility]; Future    White coat syndrome with diagnosis of hypertension  -     EKG 12 Lead performed at [facility]; Future      Patient is under treatment of orthopedic physician for the left hip and lumbar region she will go back and see the orthopedic physician i  Patient Instructions   Pt to get her vaccine @ [facility]  Offered mammo and dexa    The patient indicates understanding of these issues and agrees to the plan.  The patient is asked to Return in about 3 months (around 7/1/2024) for result discussion, after seeing consults.  .      Karlos Guzmán MD

## 2024-04-09 RX ORDER — LOSARTAN POTASSIUM AND HYDROCHLOROTHIAZIDE 25; 100 MG/1; MG/1
1 TABLET ORAL DAILY
Qty: 90 TABLET | Refills: 0 | Status: SHIPPED | OUTPATIENT
Start: 2024-04-09

## 2024-04-09 RX ORDER — FOLIC ACID 1 MG/1
1 TABLET ORAL DAILY
Qty: 90 TABLET | Refills: 0 | Status: SHIPPED | OUTPATIENT
Start: 2024-04-09

## 2024-04-09 NOTE — TELEPHONE ENCOUNTER
Name from pharmacy: Folic Acid 1 MG Oral Tablet          Will file in chart as: FOLIC ACID 1 MG Oral Tab    Sig: Take 1 tablet by mouth once daily    Disp: 90 tablet    Refills: 0    Start: 4/9/2024    Class: Normal    Non-formulary    Last ordered: 3 months ago (1/9/2024) by Karlos Guzmán MD    Last refill: 1/10/2024    Rx #: [id number]        Name from pharmacy: Diclofenac Sodium 50 MG Oral Tablet Delayed Release         Will file in chart as: DICLOFENAC 50 MG Oral Tab EC    Sig: Take 1 tablet by mouth twice daily    Disp: 180 tablet    Refills: 0    Start: 4/9/2024    Class: Normal    Non-formulary    Last ordered: 3 months ago (1/9/2024) by Karlos Guzmán MD    Last refill: 1/9/2024    Rx #: [id number]    Non-Narcotic Pain Medication Protocol Zvyiri9704/09/2024 12:15 PM   Protocol Details In person appointment or virtual visit in the past 6 mos or appointment in next 3 mos       Name from pharmacy: Losartan Potassium-HCTZ 100-25 MG Oral Tablet         Will file in chart as: LOSARTAN-HYDROCHLOROTHIAZIDE 100-25 MG Oral Tab    Sig: Take 1 tablet by mouth once daily    Disp: 90 tablet    Refills: 0    Start: 4/9/2024    Class: Normal    Last ordered: 3 months ago (1/9/2024) by Karlos Guzmán MD    Last refill: 1/9/2024    Rx #: [id number]    Hypertension Medications Protocol Aeukou5604/09/2024 12:15 PM   Protocol Details EGFRCR or GFRNAA > 50    CMP or BMP in past 12 months    Last BP reading less than 140/90    In person appointment or virtual visit in the past 12 mos or appointment in next 3 mos      To be filled at: [facility] [address] [place] [address] [phone], [phone]

## 2024-04-23 DIAGNOSIS — I10 ESSENTIAL HYPERTENSION, BENIGN: ICD-10-CM

## 2024-04-23 RX ORDER — METOPROLOL TARTRATE 100 MG/1
100 TABLET ORAL 2 TIMES DAILY
Qty: 180 TABLET | Refills: 0 | Status: SHIPPED | OUTPATIENT
Start: 2024-04-23

## 2024-04-23 NOTE — TELEPHONE ENCOUNTER
Name from pharmacy: metFORMIN HCl 1000 MG Oral Tablet         Will file in chart as: METFORMIN HCL 1000 MG Oral Tab    Sig: TAKE 1 TABLET BY MOUTH TWICE DAILY WITH MEALS    Disp: 180 tablet    Refills: 0    Start: 4/23/2024    Class: Normal    Non-formulary    Last ordered: 3 months ago (1/23/2024) by Tracey Ospina DO    Last refill: 1/23/2024    Rx #: [id number]    Diabetes Medication Protocol Wnjlve1804/23/2024 09:45 AM   Protocol Details Last A1C < 7.5 and within past 6 months    Microalbumin procedure in past 12 months or taking ACE/ARB    EGFRCR or GFRNAA > 50    In person appointment or virtual visit in the past 6 mos or appointment in next 3 mos    GFR in the past 12 months       Name from pharmacy: Metoprolol Tartrate 100 MG Oral Tablet         Will file in chart as: METOPROLOL TARTRATE 100 MG Oral Tab    Sig: Take 1 tablet by mouth twice daily    Disp: 180 tablet    Refills: 0    Start: 4/23/2024    Class: Normal    For: Essential hypertension, benign    Last ordered: 3 months ago (1/23/2024) by Tracey Ospina DO    Last refill: 1/23/2024    Rx #: [id number]    Hypertension Medications Protocol Ikjcej3204/23/2024 09:45 AM   Protocol Details EGFRCR or GFRNAA > 50    CMP or BMP in past 12 months    Last BP reading less than 140/90    In person appointment or virtual visit in the past 12 mos or appointment in next 3 mos      To be filled at: [facility] [address] [place] [address] [phone], [phone]

## 2024-04-26 DIAGNOSIS — I10 ESSENTIAL HYPERTENSION, BENIGN: ICD-10-CM

## 2024-04-29 RX ORDER — METOPROLOL TARTRATE 100 MG/1
100 TABLET ORAL 2 TIMES DAILY
Qty: 180 TABLET | Refills: 0 | OUTPATIENT
Start: 2024-04-29

## 2024-04-30 ENCOUNTER — OFFICE VISIT (OUTPATIENT)
Dept: HEMATOLOGY/ONCOLOGY | Facility: HOSPITAL | Age: 84
End: 2024-04-30
Attending: SPECIALIST
Payer: MEDICARE

## 2024-04-30 VITALS
TEMPERATURE: 98 F | BODY MASS INDEX: 31.07 KG/M2 | SYSTOLIC BLOOD PRESSURE: 186 MMHG | HEIGHT: 62.01 IN | WEIGHT: 171 LBS | DIASTOLIC BLOOD PRESSURE: 68 MMHG | OXYGEN SATURATION: 99 % | HEART RATE: 55 BPM | RESPIRATION RATE: 16 BRPM

## 2024-04-30 DIAGNOSIS — D63.1 ANEMIA DUE TO STAGE 3A CHRONIC KIDNEY DISEASE (HCC): ICD-10-CM

## 2024-04-30 DIAGNOSIS — D50.9 IRON DEFICIENCY ANEMIA, UNSPECIFIED IRON DEFICIENCY ANEMIA TYPE: ICD-10-CM

## 2024-04-30 DIAGNOSIS — N18.31 ANEMIA DUE TO STAGE 3A CHRONIC KIDNEY DISEASE (HCC): ICD-10-CM

## 2024-04-30 DIAGNOSIS — D64.9 NORMOCYTIC NORMOCHROMIC ANEMIA: Primary | ICD-10-CM

## 2024-04-30 LAB
BASOPHILS # BLD AUTO: 0.06 X10(3) UL (ref 0–0.2)
BASOPHILS NFR BLD AUTO: 1 %
DEPRECATED HBV CORE AB SER IA-ACNC: 30.4 NG/ML
EOSINOPHIL # BLD AUTO: 0.27 X10(3) UL (ref 0–0.7)
EOSINOPHIL NFR BLD AUTO: 4.7 %
ERYTHROCYTE [DISTWIDTH] IN BLOOD BY AUTOMATED COUNT: 15.5 %
FOLATE SERPL-MCNC: >100 NG/ML (ref 8.7–?)
HCT VFR BLD AUTO: 31 %
HGB BLD-MCNC: 10.1 G/DL
HGB RETIC QN AUTO: 30.2 PG (ref 28.2–36.6)
IMM GRANULOCYTES # BLD AUTO: 0.01 X10(3) UL (ref 0–1)
IMM GRANULOCYTES NFR BLD: 0.2 %
IMM RETICS NFR: 0.12 RATIO (ref 0.1–0.3)
IRON SATN MFR SERPL: 16 %
IRON SERPL-MCNC: 86 UG/DL
LYMPHOCYTES # BLD AUTO: 2.2 X10(3) UL (ref 1–4)
LYMPHOCYTES NFR BLD AUTO: 38.4 %
MCH RBC QN AUTO: 27.3 PG (ref 26–34)
MCHC RBC AUTO-ENTMCNC: 32.6 G/DL (ref 31–37)
MCV RBC AUTO: 83.8 FL
MONOCYTES # BLD AUTO: 0.75 X10(3) UL (ref 0.1–1)
MONOCYTES NFR BLD AUTO: 13.1 %
NEUTROPHILS # BLD AUTO: 2.44 X10 (3) UL (ref 1.5–7.7)
NEUTROPHILS # BLD AUTO: 2.44 X10(3) UL (ref 1.5–7.7)
NEUTROPHILS NFR BLD AUTO: 42.6 %
PLATELET # BLD AUTO: 297 10(3)UL (ref 150–450)
RBC # BLD AUTO: 3.7 X10(6)UL
RETICS # AUTO: 56.6 X10(3) UL (ref 22.5–147.5)
RETICS/RBC NFR AUTO: 1.5 %
TIBC SERPL-MCNC: 550 UG/DL (ref 240–450)
TRANSFERRIN SERPL-MCNC: 369 MG/DL (ref 200–360)
VIT B12 SERPL-MCNC: 611 PG/ML (ref 193–986)
WBC # BLD AUTO: 5.7 X10(3) UL (ref 4–11)

## 2024-04-30 PROCEDURE — 99205 OFFICE O/P NEW HI 60 MIN: CPT | Performed by: SPECIALIST

## 2024-04-30 NOTE — PROGRESS NOTES
Patient is here for MD consult for Anemia. Referred by PCP. Patient had a colonoscopy in the fall. Denies any issues with bleeding. Feeling well.     Education Record    Learner:  Patient    Disease / Diagnosis:  Anemia     Barriers / Limitations:  None   Comments:    Method:  Discussion   Comments:    General Topics:  Plan of care reviewed   Comments:    Outcome:  Shows understanding   Comments:

## 2024-05-01 PROBLEM — D50.8 IRON DEFICIENCY ANEMIA REFRACTORY TO IRON THERAPY: Status: ACTIVE | Noted: 2024-05-01

## 2024-05-01 LAB — ERYTHROPOIETIN: 8.4 MIU/ML

## 2024-05-02 ENCOUNTER — TELEPHONE (OUTPATIENT)
Dept: INTERNAL MEDICINE CLINIC | Facility: CLINIC | Age: 84
End: 2024-05-02

## 2024-05-02 ENCOUNTER — TELEPHONE (OUTPATIENT)
Dept: HEMATOLOGY/ONCOLOGY | Facility: HOSPITAL | Age: 84
End: 2024-05-02

## 2024-05-02 NOTE — TELEPHONE ENCOUNTER
Please verify if the patient is taking Naprosyn diclofenac and aspirin and let me know if she cannot be taking all these medicines complete because they are duplicate medicines and they can mess up her kidneys.

## 2024-05-02 NOTE — TELEPHONE ENCOUNTER
Test(s) completed: CBC, Iron Studies    Results:per Dr Gonzalez \"Please call patient. Let her know that she is iron deficient. I recommend that she stop her oral iron tablets and come in for IV iron dextran with test dose. Labs should the be repeated 3 months later and she sees me 1-7 days after labs.\"    Plan: Notified patient and her daughter Pita. Both verbalized understanding and agreed with plan. Message sent to PSRs to schedule patient.

## 2024-05-02 NOTE — TELEPHONE ENCOUNTER
VM - pt informed, changed diclofenac to PRN, historical update and note made on medication list.    Spoke to pt, she said she can stop taking the diclofenac - she tries to ignore the pain. Pt will use only as needed and stop taking scheduled BID.

## 2024-05-02 NOTE — TELEPHONE ENCOUNTER
VM - pt wants to know if she needs to stop the diclofenac? Please advise, ty!    Spoke to pt, she no longer takes Naproxen (that was only for about a week when she was getting back pain injections).     Pt currently takes 1 baby aspirin 81 mg PO once per day.   Pt also takes diclofenac 50 mg PO two times per day.     Medication list updated.

## 2024-05-02 NOTE — TELEPHONE ENCOUNTER
She can stop the diclofenac if she does not have any pain.  If she decides to take it she should take it as needed and sparingly

## 2024-05-03 DIAGNOSIS — I10 ESSENTIAL HYPERTENSION, BENIGN: ICD-10-CM

## 2024-05-04 RX ORDER — AMLODIPINE BESYLATE 5 MG/1
5 TABLET ORAL 2 TIMES DAILY
Qty: 180 TABLET | Refills: 0 | Status: SHIPPED | OUTPATIENT
Start: 2024-05-04

## 2024-05-04 NOTE — TELEPHONE ENCOUNTER
Protocol FAIL    Requesting: AMLODIPINE 5 MG Oral Tab     LOV: 4/1/24  RTC: 3 months  Filled: 1/31/24 180 tablet 0 refill  Recent Labs: 7/19/23    Upcoming OV   Future Appointments   Date Time Provider Department Center   5/8/2024 11:30 AM EH TX RN5  CHEMO [facility]   5/15/2024 10:00 AM Nathan Sharif,  SGINP [facility]   7/8/2024 10:00 AM Karlos Guzmán MD EMG 8 [facility] [place]   8/1/2024  1:00 PM Michael Gonzalez MD  HEM ONC [facility]   8/1/2024  1:30 PM EH TX RN3  [facility]   8/19/2024  9:40 AM Lizzette Ignacio APRN SGINP [facility] SUB GI   11/29/2024 10:00 AM  MR RM3 (3T WIDE)  MRI [facility]   12/26/2024 10:00 AM Callum Zambrano MD [id number] EC Nap 4

## 2024-05-04 NOTE — PROGRESS NOTES
[facility] Consultation Note      Patient Name: Lu Nicole   YOB: 1940  Medical Record Number: [id number]  Consulting Physician: Michael Gonzalez M.D.   Referring Physician: Karlos Guzmán MD     The 21st Century Cures Act makes medical notes like these available to patients in the interest of transparency. Please be advised this is a medical document. Medical documents are intended to carry relevant information, facts as evident, and the clinical opinion of the practitioner. The medical note is intended as peer to peer communication and may appear blunt or direct. It is written in medical language and may contain abbreviations or verbiage that are unfamiliar.      Date of Consultation: 4/30/2024      Reason for Consultation (Chief Complaint)  Anemia.     History of Present Illness  Lu Nicole is a 84 year old female who is referred for anemia. Complete blood count on 03/06/2024 showed a hemoglobin of 9.3 g/dl. Red cell indices were normal and the remainder of the CBC was unremarkable. TSH was normal. Creatinine was 1.27 mg/dl.     Iron studies performed in 01/31/2024 suggested a degree of iron deficiency and patient was started on oral iron therapy, which she continues. Vitamin B12 at that time was normal.     On 11/29/2023, EGD was unremarkable and biopsies from the duodenum and gastric antrum was without diagnostic abnormality. Biopsy from the gastric body showed chronic atropic gastritis with intestinal metaplasia. Colonoscopy showed a hyperplastic polyp in the sigmoid, nonbleeding AVM in the cecum, diverticulosis, and internal hemorrhoids.     Patient denies any melena or bright red blood per rectum. She denies any progressive shortness of breath. She reports stable chronic fatigue.     Past Medical History   Past Medical History:    Abdominal distention    Abdominal pain    Abnormal stress test    Arthritis    Arthritis of hand    Back pain    Bloating    carpal tunnel  syndrome right wrist   Global 11/23/2015    Cataract    Constipation    Eye disease    Frequent use of laxatives    High cholesterol    Left hip pain    Leg swelling    Osteoarthritis of left shoulder, unspecified osteoarthritis type    Osteoarthrosis, unspecified whether generalized or localized, unspecified site    Other and unspecified hyperlipidemia    Pain in joints    PONV (postoperative nausea and vomiting)    slow to awaken    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    Problems with swallowing    S/P total knee replacement using cement, left    Senile osteoporosis    Unspecified essential hypertension    Urethral stricture due to infection    Urinary incontinence    Weakness of right hand    Wears glasses     Past Surgical History   Past Surgical History:   Procedure Laterality Date    Appendectomy      Cardiac catheterization  11/18/2013    Hemorrhoidectomy,int/ext,simple      Hysterectomy  01/01/1984    YANELI/BSO    Knee replacement surgery  11/03/2009    RIGHT    Knee replacement surgery  11/11/2008    LEFT    Oophorectomy      Other surgical history Right 08/25/2015    Procedure: WRIST CARPAL TUNNEL RELEASE;  Surgeon: Chris Perez MD;  Location: [facility] OR    Tonsillectomy       Family History   Family History   Problem Relation Age of Onset    Breast Cancer Daughter 44    Diabetes Mother     Hypertension Mother     Diabetes Other         sibling, family history of      Social History   Social History     Socioeconomic History    Marital status:    Tobacco Use    Smoking status: Never    Smokeless tobacco: Never   Vaping Use    Vaping status: Never Used   Substance and Sexual Activity    Alcohol use: Not Currently     Alcohol/week: 0.0 standard drinks of alcohol    Drug use: No   Other Topics Concern    Caffeine Concern Yes     Comment: 1 cup coffee/day    Exercise No     Current Medications   metFORMIN HCl 1000 MG Oral Tab Take 1 tablet (1,000 mg total) by mouth 2 (two) times daily with  meals. 180 tablet 0    metoprolol tartrate 100 MG Oral Tab Take 1 tablet (100 mg total) by mouth 2 (two) times daily. 180 tablet 0    folic acid 1 MG Oral Tab Take 1 tablet (1 mg total) by mouth daily. 90 tablet 0    [DISCONTINUED] diclofenac 50 MG Oral Tab EC Take 1 tablet (50 mg total) by mouth 2 (two) times daily. 180 tablet 0    losartan-hydroCHLOROthiazide 100-25 MG Oral Tab Take 1 tablet by mouth daily. 90 tablet 0    hydrALAZINE 25 MG Oral Tab Take 1 tablet (25 mg total) by mouth 3 (three) times daily. 270 tablet 0    [DISCONTINUED] AMLODIPINE 5 MG Oral Tab Take 1 tablet by mouth twice daily 180 tablet 0    DULoxetine 20 MG Oral Cap DR Particles Take 1 capsule (20 mg total) by mouth daily.      FENOFIBRATE 160 MG Oral Tab Take 1 tablet by mouth once daily 90 tablet 0    [DISCONTINUED] naproxen 500 MG Oral Tab EC Take 1 tablet (500 mg total) by mouth 2 (two) times daily with meals. 60 tablet 0    diazePAM 2 MG Oral Tab Take 1 tablet (2 mg total) by mouth every 12 (twelve) hours as needed.      Multiple Vitamins-Minerals (ICAPS AREDS FORMULA) Oral Tab Take 1 each by mouth 2 (two) times daily.      Cholecalciferol (VITAMIN D3) 1000 units Oral Cap Take 1 tablet by mouth daily.      Omega 3 1000 MG Oral Cap Take 3 capsules by mouth daily. 90 capsule 3    ASPIRIN 81 MG OR TBEC 1 TABLET DAILY       Allergies   Ms. Nicole is allergic to cortisone and latex.    Vital Signs   BP (!) 186/68 (BP Location: Left arm, Patient Position: Sitting, Cuff Size: adult)   Pulse 55   Temp 98 °F (36.7 °C) (Tympanic)   Resp 16   Ht 1.575 m (5' 2.01\")   Wt 77.6 kg (171 lb)   SpO2 99%   BMI 31.27 kg/m²     Physical Examination   Constitutional      Well developed, well nourished. Appears close to chronological age. No apparent distress.   Head   Normocephalic and atraumatic.  Eyes   Conjunctiva clear; sclera anicteric.  ENMT                 External nose normal; external ears normal.  Neck                   Supple, without  masses.  Hematologic/Lymphatic No cervical, supraclavicular, axillary lymphadenopathy.  Respiratory          Normal effort; no respiratory distress; lungs clear to auscultation bilaterally.  Cardiovascular     Regular rate and rhythm; normal S1S2.  Abdomen            Non-tender; non-distended; no masses; no fluid wave; no hepatosplenomegaly.  Musculoskeletal   No joint swelling or erythema.  Extremities          No lower extremity edema.   Integumentary      No obvious rashes.   Neurologic           Motor and sensory grossly intact.  Psychiatric          Mood and affect appropriate.      Laboratory   Recent Results (from the past 168 hour(s))   RETICULOCYTE COUNT [E]    Collection Time: 04/30/24  2:41 PM   Result Value Ref Range    Retic% 1.5 0.5 - 2.5 %    Retic Absolute 56.6 22.5 - 147.5 x10(3) uL    Retic IRF 0.117 0.100 - 0.300 Ratio    Reticulocyte Hemoglobin Equivalent 30.2 28.2 - 36.6 pg   FERRITIN [E]    Collection Time: 04/30/24  2:41 PM   Result Value Ref Range    Ferritin 30.4 18.0 - 340.0 ng/mL   FOLIC ACID SERUM [E]    Collection Time: 04/30/24  2:41 PM   Result Value Ref Range    Folate (Folic Acid) >100.0 >=8.7 ng/mL   IRON AND TIBC [E]    Collection Time: 04/30/24  2:41 PM   Result Value Ref Range    Iron 86 50 - 170 ug/dL    Transferrin 369 (H) 200 - 360 mg/dL    Total Iron Binding Capacity 550 (H) 240 - 450 ug/dL    % Saturation 16 15 - 50 %   Erythropoietin (EPO), Serum    Collection Time: 04/30/24  2:41 PM   Result Value Ref Range    Erythropoietin 8.4 2.6 - 18.5 mIU/mL   CBC W/ DIFFERENTIAL    Collection Time: 04/30/24  2:41 PM   Result Value Ref Range    WBC 5.7 4.0 - 11.0 x10(3) uL    RBC 3.70 (L) 3.80 - 5.30 x10(6)uL    HGB 10.1 (L) 12.0 - 16.0 g/dL    HCT 31.0 (L) 35.0 - 48.0 %    .0 150.0 - 450.0 10(3)uL    MCV 83.8 80.0 - 100.0 fL    MCH 27.3 26.0 - 34.0 pg    MCHC 32.6 31.0 - 37.0 g/dL    RDW 15.5 %    Neutrophil Absolute Prelim 2.44 1.50 - 7.70 x10 (3) uL    Neutrophil Absolute  2.44 1.50 - 7.70 x10(3) uL    Lymphocyte Absolute 2.20 1.00 - 4.00 x10(3) uL    Monocyte Absolute 0.75 0.10 - 1.00 x10(3) uL    Eosinophil Absolute 0.27 0.00 - 0.70 x10(3) uL    Basophil Absolute 0.06 0.00 - 0.20 x10(3) uL    Immature Granulocyte Absolute 0.01 0.00 - 1.00 x10(3) uL    Neutrophil % 42.6 %    Lymphocyte % 38.4 %    Monocyte % 13.1 %    Eosinophil % 4.7 %    Basophil % 1.0 %    Immature Granulocyte % 0.2 %   Vitamin B12 with reflex to MMA    Collection Time: 04/30/24  2:41 PM   Result Value Ref Range    Vitamin B12 611 193 - 986 pg/mL   HOLD MMA    Collection Time: 04/30/24  2:41 PM   Result Value Ref Range    HOLD MMA Auto Resulted      Impression and Plan   Normocytic normochromic anemia: Patient's anemia is chronic in nature, multifactorial, and currently not significantly different than older values. Laboratory studies today show a persistent degree of iron deficiency. Erythropoietin level is inappropriately low for the degree of anemia consistent with anemia of chronic kidney disease.     I recommend IV iron therapy with iron dextran. She can discontinue oral iron therapy. Following therapy, I recommend repeat CBC 3 months later.    Of note, patient is taking diclofenac BID, naproxen BID, and low dose aspirin daily. Patient is unsure why she is taking all of these NSAIDs. In light of her documented iron deficiency, I suggest that she speak with her primary care physician as to whether all of these medications are necessary.    Planned Follow Up   Patient can return at her convenience for IV iron therapy.     Encounter Time  Pre-charting/reviewing medical records: 20 minutes.  In room with patient obtaining history, performing exam, counseling on diagnosis, and reviewing plan: 40 minutes.  Orders/notes: - minutes.  Total time: 60 minutes.    Electronically signed by:    Michael Gonzalez M.D.  Systemic Medical Director of Oncology Services  [facility]

## 2024-05-08 ENCOUNTER — OFFICE VISIT (OUTPATIENT)
Dept: HEMATOLOGY/ONCOLOGY | Facility: HOSPITAL | Age: 84
End: 2024-05-08
Attending: SPECIALIST
Payer: MEDICARE

## 2024-05-08 VITALS
SYSTOLIC BLOOD PRESSURE: 161 MMHG | OXYGEN SATURATION: 99 % | RESPIRATION RATE: 16 BRPM | TEMPERATURE: 98 F | DIASTOLIC BLOOD PRESSURE: 76 MMHG | HEART RATE: 58 BPM

## 2024-05-08 DIAGNOSIS — D50.8 IRON DEFICIENCY ANEMIA REFRACTORY TO IRON THERAPY: Primary | ICD-10-CM

## 2024-05-08 PROCEDURE — 96376 TX/PRO/DX INJ SAME DRUG ADON: CPT

## 2024-05-08 PROCEDURE — 96365 THER/PROPH/DIAG IV INF INIT: CPT

## 2024-05-08 NOTE — PROGRESS NOTES
Pt here for infed. Pt denies any issues or concerns. Clarified with Dr. Gonzalez that total dose is 750mg of infed.      Ordering MD: Carlos  Order Exp: after today's dose     Pt tolerated infusion without difficulty or complaint. Reviewed next apt date/time: 8/1 at 1pm      Education Record  Learner:  Patient  Disease / Diagnosis: anemia  Barriers / Limitations:  None  Method:  Discussion and Reinforcement  General Topics:  Medication and Plan of care reviewed  Outcome:  Shows understanding

## 2024-05-15 ENCOUNTER — LAB ENCOUNTER (OUTPATIENT)
Dept: LAB | Age: 84
End: 2024-05-15
Attending: SPECIALIST

## 2024-05-15 DIAGNOSIS — D50.8 OTHER IRON DEFICIENCY ANEMIA: ICD-10-CM

## 2024-05-15 DIAGNOSIS — K29.40 AUTOIMMUNE GASTRITIS: ICD-10-CM

## 2024-05-15 DIAGNOSIS — K74.60 CIRRHOSIS OF LIVER WITHOUT ASCITES, UNSPECIFIED HEPATIC CIRRHOSIS TYPE (HCC): ICD-10-CM

## 2024-05-15 LAB
DEPRECATED HBV CORE AB SER IA-ACNC: 494 NG/ML
IRON SATN MFR SERPL: 26 %
IRON SERPL-MCNC: 110 UG/DL
TIBC SERPL-MCNC: 425 UG/DL (ref 250–425)
TRANSFERRIN SERPL-MCNC: 285 MG/DL (ref 250–380)

## 2024-05-15 PROCEDURE — 36415 COLL VENOUS BLD VENIPUNCTURE: CPT

## 2024-05-15 PROCEDURE — 84466 ASSAY OF TRANSFERRIN: CPT

## 2024-05-15 PROCEDURE — 83540 ASSAY OF IRON: CPT

## 2024-05-15 PROCEDURE — 82728 ASSAY OF FERRITIN: CPT

## 2024-05-30 RX ORDER — FENOFIBRATE 160 MG/1
TABLET ORAL
Qty: 90 TABLET | Refills: 0 | Status: SHIPPED | OUTPATIENT
Start: 2024-05-30

## 2024-06-01 DIAGNOSIS — I10 WHITE COAT SYNDROME WITH DIAGNOSIS OF HYPERTENSION: Chronic | ICD-10-CM

## 2024-06-03 RX ORDER — HYDRALAZINE HYDROCHLORIDE 25 MG/1
25 TABLET, FILM COATED ORAL 3 TIMES DAILY
Qty: 270 TABLET | Refills: 0 | Status: SHIPPED | OUTPATIENT
Start: 2024-06-03

## 2024-07-08 ENCOUNTER — OFFICE VISIT (OUTPATIENT)
Dept: INTERNAL MEDICINE CLINIC | Facility: CLINIC | Age: 84
End: 2024-07-08
Payer: MEDICARE

## 2024-07-08 VITALS
WEIGHT: 168.5 LBS | OXYGEN SATURATION: 98 % | BODY MASS INDEX: 31.81 KG/M2 | TEMPERATURE: 97 F | DIASTOLIC BLOOD PRESSURE: 70 MMHG | RESPIRATION RATE: 16 BRPM | HEART RATE: 68 BPM | SYSTOLIC BLOOD PRESSURE: 144 MMHG | HEIGHT: 61 IN

## 2024-07-08 DIAGNOSIS — R73.02 GLUCOSE INTOLERANCE (IMPAIRED GLUCOSE TOLERANCE): Chronic | ICD-10-CM

## 2024-07-08 DIAGNOSIS — D50.8 IRON DEFICIENCY ANEMIA REFRACTORY TO IRON THERAPY: ICD-10-CM

## 2024-07-08 DIAGNOSIS — E78.1 HYPERTRIGLYCERIDEMIA: Primary | Chronic | ICD-10-CM

## 2024-07-08 PROBLEM — D64.9 ANEMIA: Status: RESOLVED | Noted: 2022-05-03 | Resolved: 2024-07-08

## 2024-07-08 PROBLEM — R63.4 LOSS OF WEIGHT: Status: RESOLVED | Noted: 2023-11-29 | Resolved: 2024-07-08

## 2024-07-08 PROBLEM — D50.9 ANEMIA, IRON DEFICIENCY: Status: RESOLVED | Noted: 2023-11-29 | Resolved: 2024-07-08

## 2024-07-08 PROCEDURE — 99213 OFFICE O/P EST LOW 20 MIN: CPT | Performed by: INTERNAL MEDICINE

## 2024-07-08 NOTE — PROGRESS NOTES
Lu Nicole [facility] 3/4/1940 is a 84 year old female.    Chief Complaint   Patient presents with    Follow - Up     3 month        HPI:   BP check.  Patient's blood pressure numbers at home were supposedly good   Current Outpatient Medications   Medication Sig Dispense Refill    HYDRALAZINE 25 MG Oral Tab TAKE 1 TABLET BY MOUTH THREE TIMES DAILY 270 tablet 0    FENOFIBRATE 160 MG Oral Tab Take 1 tablet by mouth once daily 90 tablet 0    amLODIPine 5 MG Oral Tab Take 1 tablet (5 mg total) by mouth 2 (two) times daily. 180 tablet 0    metFORMIN HCl 1000 MG Oral Tab Take 1 tablet (1,000 mg total) by mouth 2 (two) times daily with meals. 180 tablet 0    metoprolol tartrate 100 MG Oral Tab Take 1 tablet (100 mg total) by mouth 2 (two) times daily. 180 tablet 0    folic acid 1 MG Oral Tab Take 1 tablet (1 mg total) by mouth daily. 90 tablet 0    losartan-hydroCHLOROthiazide 100-25 MG Oral Tab Take 1 tablet by mouth daily. 90 tablet 0    DULoxetine 20 MG Oral Cap DR Particles Take 1 capsule (20 mg total) by mouth daily.      diazePAM 2 MG Oral Tab Take 1 tablet (2 mg total) by mouth every 12 (twelve) hours as needed.      Multiple Vitamins-Minerals (ICAPS AREDS FORMULA) Oral Tab Take 1 each by mouth 2 (two) times daily.      Cholecalciferol (VITAMIN D3) 1000 units Oral Cap Take 1 tablet by mouth daily.      Omega 3 1000 MG Oral Cap Take 3 capsules by mouth daily. 90 capsule 3    ASPIRIN 81 MG OR TBEC 1 TABLET DAILY        Past Medical History:    Abdominal distention    Abdominal pain    Abnormal stress test    Arthritis    Arthritis of hand    Back pain    Bloating    carpal tunnel syndrome right wrist   Global 2015    Cataract    Constipation    Eye disease    Frequent use of laxatives    High cholesterol    Left hip pain    Leg swelling    Osteoarthritis of left shoulder, unspecified osteoarthritis type    Osteoarthrosis, unspecified whether generalized or localized, unspecified site    Other and unspecified  hyperlipidemia    Pain in joints    PONV (postoperative nausea and vomiting)    slow to awaken    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    Problems with swallowing    S/P total knee replacement using cement, left    Senile osteoporosis    Unspecified essential hypertension    Urethral stricture due to infection    Urinary incontinence    Weakness of right hand    Wears glasses      Social History:  Social History     Socioeconomic History    Marital status:    Tobacco Use    Smoking status: Never    Smokeless tobacco: Never   Vaping Use    Vaping status: Never Used   Substance and Sexual Activity    Alcohol use: Not Currently     Alcohol/week: 0.0 standard drinks of alcohol    Drug use: No   Other Topics Concern    Caffeine Concern Yes     Comment: 1 cup coffee/day    Exercise No   Social History Narrative    The patient does not use an assistive device..      The patient does live in a home with stairs.        REVIEW OF SYSTEMS:   Cardiovascular:   Syncope none. Rapid heart beat at rest no. Change in exercise tolerance no. Chest pain no. Chest pain while awake none. Cold extremities no. Dizziness no. Dyspnea on exertion none. Fainting none. Fatigue no. High blood pressure on medication(s). Irregular heart beat no. Leg edema no. Murmurs no. Orthopnea no.      EXAM:   /70 (BP Location: Right arm, Patient Position: Sitting, Cuff Size: adult)   Pulse 68   Temp 97 °F (36.1 °C) (Temporal)   Resp 16   Ht 5' 1\" (1.549 m)   Wt 168 lb 8 oz (76.4 kg)   SpO2 98%   BMI 31.84 kg/m²   HEENT:   jvp not raised.   Ear canals: normal.   Ear drums: normal .   Ears: unremarkable.   Mouth: unremarkable.   Nasal septum: midline.   Pharynx: normal.   Sinuses: non-tender.   HEART:   Clicks: no.   Distal Pulses Palpable: yes.   Edema: none visible .   Gallop: no .   Heart sounds: normal S1S2.   Murmurs: none.   Rhythm: regular.   LUNGS:   Airflow: normal air movement.   Auscultation: no wheezing/rhonchi/rales.    Breath sounds bilaterally: symmetrical.       ASSESSMENT AND PLAN:   Lu was seen today for follow - up.    Diagnoses and all orders for this visit:    Hypertriglyceridemia  -     Lipid Panel; Future    Glucose intolerance (impaired glucose tolerance)  -     Hemoglobin A1C; Future    Iron deficiency anemia refractory to iron therapy  -     CBC With Differential With Platelet; Future      Patient Instructions   Labs pending     The patient indicates understanding of these issues and agrees to the plan.  The patient is asked to Return in about 6 months (around 1/8/2025)..    Karlos Guzmán MD

## 2024-07-09 RX ORDER — LOSARTAN POTASSIUM AND HYDROCHLOROTHIAZIDE 25; 100 MG/1; MG/1
1 TABLET ORAL DAILY
Qty: 90 TABLET | Refills: 0 | Status: SHIPPED | OUTPATIENT
Start: 2024-07-09

## 2024-07-09 RX ORDER — FOLIC ACID 1 MG/1
1 TABLET ORAL DAILY
Qty: 90 TABLET | Refills: 0 | Status: SHIPPED | OUTPATIENT
Start: 2024-07-09

## 2024-07-22 ENCOUNTER — LAB ENCOUNTER (OUTPATIENT)
Dept: LAB | Age: 84
End: 2024-07-22
Attending: INTERNAL MEDICINE
Payer: MEDICARE

## 2024-07-22 DIAGNOSIS — D50.8 IRON DEFICIENCY ANEMIA REFRACTORY TO IRON THERAPY: ICD-10-CM

## 2024-07-22 DIAGNOSIS — R73.02 GLUCOSE INTOLERANCE (IMPAIRED GLUCOSE TOLERANCE): Chronic | ICD-10-CM

## 2024-07-22 DIAGNOSIS — Z00.00 ROUTINE GENERAL MEDICAL EXAMINATION AT A HEALTH CARE FACILITY: ICD-10-CM

## 2024-07-22 DIAGNOSIS — Z00.00 LABORATORY EXAMINATION ORDERED AS PART OF A ROUTINE GENERAL MEDICAL EXAMINATION: ICD-10-CM

## 2024-07-22 DIAGNOSIS — E78.1 HYPERTRIGLYCERIDEMIA: Chronic | ICD-10-CM

## 2024-07-22 LAB
ALBUMIN SERPL-MCNC: 4.4 G/DL (ref 3.2–4.8)
ALBUMIN/GLOB SERPL: 1.6 {RATIO} (ref 1–2)
ALP LIVER SERPL-CCNC: 34 U/L
ALT SERPL-CCNC: 16 U/L
ANION GAP SERPL CALC-SCNC: 5 MMOL/L (ref 0–18)
AST SERPL-CCNC: 24 U/L (ref ?–34)
BASOPHILS # BLD AUTO: 0.05 X10(3) UL (ref 0–0.2)
BASOPHILS NFR BLD AUTO: 0.8 %
BILIRUB SERPL-MCNC: 0.6 MG/DL (ref 0.2–1.1)
BUN BLD-MCNC: 23 MG/DL (ref 9–23)
BUN/CREAT SERPL: 21.3 (ref 10–20)
CALCIUM BLD-MCNC: 11 MG/DL (ref 8.7–10.4)
CHLORIDE SERPL-SCNC: 105 MMOL/L (ref 98–112)
CHOLEST SERPL-MCNC: 156 MG/DL (ref ?–200)
CO2 SERPL-SCNC: 30 MMOL/L (ref 21–32)
CREAT BLD-MCNC: 1.08 MG/DL
DEPRECATED RDW RBC AUTO: 48.9 FL (ref 35.1–46.3)
EGFRCR SERPLBLD CKD-EPI 2021: 51 ML/MIN/1.73M2 (ref 60–?)
EOSINOPHIL # BLD AUTO: 0.16 X10(3) UL (ref 0–0.7)
EOSINOPHIL NFR BLD AUTO: 2.7 %
ERYTHROCYTE [DISTWIDTH] IN BLOOD BY AUTOMATED COUNT: 15.4 % (ref 11–15)
EST. AVERAGE GLUCOSE BLD GHB EST-MCNC: 108 MG/DL (ref 68–126)
FASTING PATIENT LIPID ANSWER: YES
FASTING STATUS PATIENT QL REPORTED: YES
FOLATE SERPL-MCNC: >24 NG/ML (ref 5.4–?)
GLOBULIN PLAS-MCNC: 2.7 G/DL (ref 2–3.5)
GLUCOSE BLD-MCNC: 98 MG/DL (ref 70–99)
HBA1C MFR BLD: 5.4 % (ref ?–5.7)
HCT VFR BLD AUTO: 34.4 %
HDLC SERPL-MCNC: 46 MG/DL (ref 40–59)
HGB BLD-MCNC: 11.2 G/DL
IMM GRANULOCYTES # BLD AUTO: 0.02 X10(3) UL (ref 0–1)
IMM GRANULOCYTES NFR BLD: 0.3 %
LDLC SERPL CALC-MCNC: 86 MG/DL (ref ?–100)
LYMPHOCYTES # BLD AUTO: 1.89 X10(3) UL (ref 1–4)
LYMPHOCYTES NFR BLD AUTO: 32 %
MCH RBC QN AUTO: 28.2 PG (ref 26–34)
MCHC RBC AUTO-ENTMCNC: 32.6 G/DL (ref 31–37)
MCV RBC AUTO: 86.6 FL
MONOCYTES # BLD AUTO: 0.71 X10(3) UL (ref 0.1–1)
MONOCYTES NFR BLD AUTO: 12 %
NEUTROPHILS # BLD AUTO: 3.07 X10 (3) UL (ref 1.5–7.7)
NEUTROPHILS # BLD AUTO: 3.07 X10(3) UL (ref 1.5–7.7)
NEUTROPHILS NFR BLD AUTO: 52.2 %
NONHDLC SERPL-MCNC: 110 MG/DL (ref ?–130)
OSMOLALITY SERPL CALC.SUM OF ELEC: 294 MOSM/KG (ref 275–295)
PLATELET # BLD AUTO: 293 10(3)UL (ref 150–450)
POTASSIUM SERPL-SCNC: 4.4 MMOL/L (ref 3.5–5.1)
PROT SERPL-MCNC: 7.1 G/DL (ref 5.7–8.2)
RBC # BLD AUTO: 3.97 X10(6)UL
SODIUM SERPL-SCNC: 140 MMOL/L (ref 136–145)
TRIGL SERPL-MCNC: 137 MG/DL (ref 30–149)
TSI SER-ACNC: 2.97 MIU/ML (ref 0.55–4.78)
VIT B12 SERPL-MCNC: 453 PG/ML (ref 211–911)
VLDLC SERPL CALC-MCNC: 22 MG/DL (ref 0–30)
WBC # BLD AUTO: 5.9 X10(3) UL (ref 4–11)

## 2024-07-22 PROCEDURE — 85025 COMPLETE CBC W/AUTO DIFF WBC: CPT

## 2024-07-22 PROCEDURE — 82607 VITAMIN B-12: CPT

## 2024-07-22 PROCEDURE — 84443 ASSAY THYROID STIM HORMONE: CPT

## 2024-07-22 PROCEDURE — 80061 LIPID PANEL: CPT

## 2024-07-22 PROCEDURE — 83036 HEMOGLOBIN GLYCOSYLATED A1C: CPT

## 2024-07-22 PROCEDURE — 80053 COMPREHEN METABOLIC PANEL: CPT

## 2024-07-22 PROCEDURE — 82746 ASSAY OF FOLIC ACID SERUM: CPT

## 2024-07-22 PROCEDURE — 36415 COLL VENOUS BLD VENIPUNCTURE: CPT

## 2024-07-23 DIAGNOSIS — I10 ESSENTIAL HYPERTENSION, BENIGN: ICD-10-CM

## 2024-07-23 RX ORDER — METOPROLOL TARTRATE 100 MG/1
100 TABLET ORAL 2 TIMES DAILY
Qty: 180 TABLET | Refills: 0 | Status: SHIPPED | OUTPATIENT
Start: 2024-07-23

## 2024-08-01 ENCOUNTER — TELEPHONE (OUTPATIENT)
Dept: HEMATOLOGY/ONCOLOGY | Facility: HOSPITAL | Age: 84
End: 2024-08-01

## 2024-08-01 ENCOUNTER — OFFICE VISIT (OUTPATIENT)
Dept: HEMATOLOGY/ONCOLOGY | Facility: HOSPITAL | Age: 84
End: 2024-08-01
Attending: SPECIALIST
Payer: MEDICARE

## 2024-08-01 VITALS
HEIGHT: 62.01 IN | WEIGHT: 171.38 LBS | TEMPERATURE: 98 F | SYSTOLIC BLOOD PRESSURE: 179 MMHG | DIASTOLIC BLOOD PRESSURE: 71 MMHG | HEART RATE: 69 BPM | BODY MASS INDEX: 31.14 KG/M2 | OXYGEN SATURATION: 98 %

## 2024-08-01 DIAGNOSIS — D64.9 NORMOCYTIC NORMOCHROMIC ANEMIA: ICD-10-CM

## 2024-08-01 DIAGNOSIS — D63.1 ANEMIA IN STAGE 3 CHRONIC KIDNEY DISEASE, UNSPECIFIED WHETHER STAGE 3A OR 3B CKD (HCC): Primary | ICD-10-CM

## 2024-08-01 DIAGNOSIS — D50.9 IRON DEFICIENCY ANEMIA, UNSPECIFIED IRON DEFICIENCY ANEMIA TYPE: ICD-10-CM

## 2024-08-01 DIAGNOSIS — N18.30 ANEMIA IN STAGE 3 CHRONIC KIDNEY DISEASE, UNSPECIFIED WHETHER STAGE 3A OR 3B CKD (HCC): Primary | ICD-10-CM

## 2024-08-01 LAB
BASOPHILS # BLD AUTO: 0.05 X10(3) UL (ref 0–0.2)
BASOPHILS NFR BLD AUTO: 0.7 %
DEPRECATED HBV CORE AB SER IA-ACNC: 118 NG/ML
EOSINOPHIL # BLD AUTO: 0.14 X10(3) UL (ref 0–0.7)
EOSINOPHIL NFR BLD AUTO: 2.1 %
ERYTHROCYTE [DISTWIDTH] IN BLOOD BY AUTOMATED COUNT: 14.8 %
HCT VFR BLD AUTO: 32.2 %
HGB BLD-MCNC: 10.7 G/DL
IMM GRANULOCYTES # BLD AUTO: 0.03 X10(3) UL (ref 0–1)
IMM GRANULOCYTES NFR BLD: 0.4 %
IRON SATN MFR SERPL: 25 %
IRON SERPL-MCNC: 95 UG/DL
LYMPHOCYTES # BLD AUTO: 2.08 X10(3) UL (ref 1–4)
LYMPHOCYTES NFR BLD AUTO: 30.8 %
MCH RBC QN AUTO: 27.9 PG (ref 26–34)
MCHC RBC AUTO-ENTMCNC: 33.2 G/DL (ref 31–37)
MCV RBC AUTO: 84.1 FL
MONOCYTES # BLD AUTO: 0.71 X10(3) UL (ref 0.1–1)
MONOCYTES NFR BLD AUTO: 10.5 %
NEUTROPHILS # BLD AUTO: 3.75 X10 (3) UL (ref 1.5–7.7)
NEUTROPHILS # BLD AUTO: 3.75 X10(3) UL (ref 1.5–7.7)
NEUTROPHILS NFR BLD AUTO: 55.5 %
PLATELET # BLD AUTO: 271 10(3)UL (ref 150–450)
RBC # BLD AUTO: 3.83 X10(6)UL
TOTAL IRON BINDING CAPACITY: 385 UG/DL (ref 250–425)
TRANSFERRIN SERPL-MCNC: 312 MG/DL (ref 250–380)
WBC # BLD AUTO: 6.8 X10(3) UL (ref 4–11)

## 2024-08-01 PROCEDURE — 99213 OFFICE O/P EST LOW 20 MIN: CPT | Performed by: SPECIALIST

## 2024-08-01 NOTE — TELEPHONE ENCOUNTER
Attempted to call pt about test results. Per OXANA \"do not leave voicemail\" will try again tomorrow.

## 2024-08-01 NOTE — PROGRESS NOTES
Education Record    Learner:  Patient/  family member.       Disease / Diagnosis:  Iron def. Anemia     Barriers / Limitations:  None   Comments:    Method:  Discussion   Comments:    General Topics:  Plan of care reviewed   Comments:    Outcome:  Shows understanding   Comments:   Pt received Infed in May , tolerated well.   Did not notice feeling any different after the infusion.

## 2024-08-01 NOTE — PROGRESS NOTES
[facility] Progress Note      Patient Name: Lu Nicole   YOB: 1940  Medical Record Number: [id number]  Attending Physician: Michael Gonzalez M.D.      The 21st Century Cures Act makes medical notes like these available to patients in the interest of transparency. Please be advised this is a medical document. Medical documents are intended to carry relevant information, facts as evident, and the clinical opinion of the practitioner. The medical note is intended as peer to peer communication and may appear blunt or direct. It is written in medical language and may contain abbreviations or verbiage that are unfamiliar.      Date of Visit: 8/1/2024        Chief Complaint  Anemia - follow up.    History of Present Illness  Lu Nicole is a 84 year old female first seen in 04/2024 for anemia. Complete blood count on 03/06/2024 showed a hemoglobin of 9.3 g/dl. Red cell indices were normal and the remainder of the CBC was unremarkable. TSH was normal. Creatinine was 1.27 mg/dl.     Iron studies performed in 01/31/2024 suggested a degree of iron deficiency and patient was started on oral iron therapy. Vitamin B12 at that time was normal. She received IV iron dextran on 05/2024.    On 11/29/2023, EGD was unremarkable and biopsies from the duodenum and gastric antrum was without diagnostic abnormality. Biopsy from the gastric body showed chronic atropic gastritis with intestinal metaplasia. Colonoscopy showed a hyperplastic polyp in the sigmoid, nonbleeding AVM in the cecum, diverticulosis, and internal hemorrhoids.     Patient denies any melena or bright red blood per rectum. She denies any progressive shortness of breath. She reports stable chronic fatigue.     She states that she has felt more energetic since receiving IV iron.     Past Medical History (historical data, reviewed by physician)   Past Medical History:    Abdominal distention    Abdominal pain    Abnormal stress test     Arthritis    Arthritis of hand    Back pain    Bloating    carpal tunnel syndrome right wrist   Global 11/23/2015    Cataract    Constipation    Eye disease    Frequent use of laxatives    High cholesterol    Left hip pain    Leg swelling    Osteoarthritis of left shoulder, unspecified osteoarthritis type    Osteoarthrosis, unspecified whether generalized or localized, unspecified site    Other and unspecified hyperlipidemia    Pain in joints    PONV (postoperative nausea and vomiting)    slow to awaken    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    Problems with swallowing    S/P total knee replacement using cement, left    Senile osteoporosis    Unspecified essential hypertension    Urethral stricture due to infection    Urinary incontinence    Weakness of right hand    Wears glasses     Past Surgical History (historical data, reviewed by physician)   Past Surgical History:   Procedure Laterality Date    Appendectomy      Cardiac catheterization  11/18/2013    Hemorrhoidectomy,int/ext,simple      Hysterectomy  01/01/1984    YANELI/BSO    Knee replacement surgery  11/03/2009    RIGHT    Knee replacement surgery  11/11/2008    LEFT    Oophorectomy      Other surgical history Right 08/25/2015    Procedure: WRIST CARPAL TUNNEL RELEASE;  Surgeon: Chris Perez MD;  Location:  MAIN OR    Tonsillectomy       Family History (historical data, reviewed by physician)   Family History   Problem Relation Age of Onset    Breast Cancer Daughter 44    Diabetes Mother     Hypertension Mother     Diabetes Other         sibling, family history of      Social History (historical data, reviewed by physician)   Social History     Socioeconomic History    Marital status:    Tobacco Use    Smoking status: Never    Smokeless tobacco: Never   Vaping Use    Vaping status: Never Used   Substance and Sexual Activity    Alcohol use: Not Currently     Alcohol/week: 0.0 standard drinks of alcohol    Drug use: No   Other Topics  Concern    Caffeine Concern Yes     Comment: 1 cup coffee/day    Exercise No     Current Medications   METOPROLOL TARTRATE 100 MG Oral Tab Take 1 tablet by mouth twice daily 180 tablet 0    METFORMIN HCL 1000 MG Oral Tab TAKE 1 TABLET BY MOUTH TWICE DAILY WITH MEALS 180 tablet 0    LOSARTAN-HYDROCHLOROTHIAZIDE 100-25 MG Oral Tab Take 1 tablet by mouth once daily 90 tablet 0    FOLIC ACID 1 MG Oral Tab Take 1 tablet by mouth once daily 90 tablet 0    HYDRALAZINE 25 MG Oral Tab TAKE 1 TABLET BY MOUTH THREE TIMES DAILY 270 tablet 0    FENOFIBRATE 160 MG Oral Tab Take 1 tablet by mouth once daily 90 tablet 0    amLODIPine 5 MG Oral Tab Take 1 tablet (5 mg total) by mouth 2 (two) times daily. 180 tablet 0    DULoxetine 20 MG Oral Cap DR Particles Take 1 capsule (20 mg total) by mouth daily.      diazePAM 2 MG Oral Tab Take 1 tablet (2 mg total) by mouth every 12 (twelve) hours as needed.      Multiple Vitamins-Minerals (ICAPS AREDS FORMULA) Oral Tab Take 1 each by mouth 2 (two) times daily.      Cholecalciferol (VITAMIN D3) 1000 units Oral Cap Take 1 tablet by mouth daily.      Omega 3 1000 MG Oral Cap Take 3 capsules by mouth daily. 90 capsule 3    ASPIRIN 81 MG OR TBEC 1 TABLET DAILY       Allergies   Ms. Nicole is allergic to cortisone and latex.    Vital Signs   BP (!) 179/71 (BP Location: Left arm, Patient Position: Sitting, Cuff Size: adult)   Pulse 69   Temp 98 °F (36.7 °C) (Temporal)   Ht 1.575 m (5' 2.01\")   Wt 77.7 kg (171 lb 6.4 oz)   SpO2 98%   BMI 31.34 kg/m²     Physical Examination   Constitutional      Well developed, well nourished. Appears close to chronological age. No apparent distress.   Head   Normocephalic and atraumatic.  Eyes   Conjunctiva clear; sclera anicteric.  ENMT                 External nose normal; external ears normal.  Respiratory          Normal effort; no respiratory distress; lungs clear to auscultation bilaterally.  Cardiovascular     Regular rate and rhythm; normal  S1S2.  Neurologic           Motor and sensory grossly intact.  Psychiatric          Mood and affect appropriate.    Laboratory   Recent Results (from the past 168 hour(s))   FERRITIN [E]    Collection Time: 08/01/24 12:50 PM   Result Value Ref Range    Ferritin 118.0 50.0 - 306.0 ng/mL   IRON AND TIBC [E]    Collection Time: 08/01/24 12:50 PM   Result Value Ref Range    Iron 95 50 - 170 ug/dL    Transferrin 312 250 - 380 mg/dL    Total Iron Binding Capacity 385 250 - 425 ug/dL    % Saturation 25 15 - 50 %   CBC W/ DIFFERENTIAL    Collection Time: 08/01/24 12:50 PM   Result Value Ref Range    WBC 6.8 4.0 - 11.0 x10(3) uL    RBC 3.83 3.80 - 5.30 x10(6)uL    HGB 10.7 (L) 12.0 - 16.0 g/dL    HCT 32.2 (L) 35.0 - 48.0 %    .0 150.0 - 450.0 10(3)uL    MCV 84.1 80.0 - 100.0 fL    MCH 27.9 26.0 - 34.0 pg    MCHC 33.2 31.0 - 37.0 g/dL    RDW 14.8 %    Neutrophil Absolute Prelim 3.75 1.50 - 7.70 x10 (3) uL    Neutrophil Absolute 3.75 1.50 - 7.70 x10(3) uL    Lymphocyte Absolute 2.08 1.00 - 4.00 x10(3) uL    Monocyte Absolute 0.71 0.10 - 1.00 x10(3) uL    Eosinophil Absolute 0.14 0.00 - 0.70 x10(3) uL    Basophil Absolute 0.05 0.00 - 0.20 x10(3) uL    Immature Granulocyte Absolute 0.03 0.00 - 1.00 x10(3) uL    Neutrophil % 55.5 %    Lymphocyte % 30.8 %    Monocyte % 10.5 %    Eosinophil % 2.1 %    Basophil % 0.7 %    Immature Granulocyte % 0.4 %       Impression and Plan   Normocytic normochromic anemia: Hemoglobin is stable. Iron studies are normal. Patient reported an improvement in energy after receiving IV iron.     Patient's anemia is chronic in nature and multifactorial including anemia of chronic kidney disease as evidenced by an inappropriately low erythropoietin level.     At initial consultation with me, patient was taking taking diclofenac BID, naproxen BID, and low dose aspirin daily. Since then her primary care physician has discontinued the NSAIDs except for low dose aspirin.     I recommend repeat labs in  approximately 6 months. If these show stable iron studies, routine follow up with hematology will not be necessary. It can be expected that she will have a chronic mild anemia. Should this worsen, I will be happy to see her again for workup and consideration of PARVEEN therapy.     Planned Follow Up   Patient will have repeat labs performed in 6 months at an [facility] lab close to her home.    Electronically signed by:    Michael Gonzalez M.D.  Systemic Medical Director of Oncology Services  [facility]

## 2024-08-01 NOTE — TELEPHONE ENCOUNTER
----- Message from Michael Gonzalez sent at 8/1/2024  2:12 PM CDT -----  Please call her and let her know that her iron levels are normal.

## 2024-08-02 ENCOUNTER — TELEPHONE (OUTPATIENT)
Dept: HEMATOLOGY/ONCOLOGY | Facility: HOSPITAL | Age: 84
End: 2024-08-02

## 2024-08-02 PROBLEM — N18.30 ANEMIA IN STAGE 3 CHRONIC KIDNEY DISEASE (HCC): Status: ACTIVE | Noted: 2024-08-02

## 2024-08-02 PROBLEM — D63.1 ANEMIA IN STAGE 3 CHRONIC KIDNEY DISEASE (HCC): Status: ACTIVE | Noted: 2024-08-02

## 2024-08-06 DIAGNOSIS — I10 ESSENTIAL HYPERTENSION, BENIGN: ICD-10-CM

## 2024-08-06 RX ORDER — AMLODIPINE BESYLATE 5 MG/1
5 TABLET ORAL 2 TIMES DAILY
Qty: 180 TABLET | Refills: 0 | Status: SHIPPED | OUTPATIENT
Start: 2024-08-06

## 2024-08-15 ENCOUNTER — TELEPHONE (OUTPATIENT)
Dept: INTERNAL MEDICINE CLINIC | Facility: CLINIC | Age: 84
End: 2024-08-15

## 2024-08-15 NOTE — TELEPHONE ENCOUNTER
Patient called the office requesting a medication for shingles. Patient was not able to provide a name for this medication. Please call back and advise.

## 2024-08-15 NOTE — TELEPHONE ENCOUNTER
Spoke with patient to see if she can come in for an office visit so VM can look at rash to determine the treatment.     Patient can't get a ride and said she will use calamine lotion and keep us updated if that helps.

## 2024-08-15 NOTE — TELEPHONE ENCOUNTER
Pt has had shingles in the past and she is certain she has it again.   Pt stated yesterday she had some bumps on her back. She can't see them, but they are raised and painful. Painful to lean against them and sleep. Today more bumps on her back.   Today pt has a few bumps that are on her stomach. They are itchy, not painful.   No other symptoms. No fever.     VM: Pt requesting anti-viral medication as she is very certain this is shingles. Please advise. Send in Rx or would pt need appt?

## 2024-08-28 RX ORDER — FENOFIBRATE 160 MG/1
TABLET ORAL
Qty: 90 TABLET | Refills: 1 | Status: SHIPPED | OUTPATIENT
Start: 2024-08-28

## 2024-08-28 NOTE — TELEPHONE ENCOUNTER
Protocol passed     Fenofibrate 160mg     LOV: 7/8/24   RTC: 6 months   Filled: 5/30/24 #90 0 refills   Labs: 7/22/24   Future Appointments   Date Time Provider Department Center   11/29/2024 10:00 AM  MR RM3 (3T WIDE)  [facility]   12/26/2024 10:00 AM Callum Zambrano MD [id number] [facility] Nap 4

## 2024-09-05 DIAGNOSIS — I10 WHITE COAT SYNDROME WITH DIAGNOSIS OF HYPERTENSION: Chronic | ICD-10-CM

## 2024-09-05 RX ORDER — HYDRALAZINE HYDROCHLORIDE 25 MG/1
25 TABLET, FILM COATED ORAL 3 TIMES DAILY
Qty: 270 TABLET | Refills: 0 | Status: SHIPPED | OUTPATIENT
Start: 2024-09-05

## 2024-10-08 RX ORDER — LOSARTAN POTASSIUM AND HYDROCHLOROTHIAZIDE 25; 100 MG/1; MG/1
1 TABLET ORAL DAILY
Qty: 90 TABLET | Refills: 0 | Status: SHIPPED | OUTPATIENT
Start: 2024-10-08

## 2024-10-14 NOTE — ED NOTES
"    CARDIOLOGY        Patient Name: Anjum Palma Jr  :1954  Age: 70 y.o.  Primary Cardiologist: Layton Reynolds MD and Manuel English MD  Encounter Provider:  JUAN CARLOS Delatorre    Date of Service: 10/14/24    CHIEF COMPLAINT / REASON FOR OFFICE VISIT     Follow-Up (Atrial fibrillation, HTN, LBBB, CHARLY)      HISTORY OF PRESENT ILLNESS       Atrial Fibrillation  Past medical history includes atrial fibrillation.     Anjum Palma Jr is a 70 y.o. male who presents today for annual evaluation.    Pt has a  history significant for persistent atrial fibrillation, hypertension, LBBB, CHARLY, prostate cancer.    Patient with history of asymptomatic persistent atrial fibrillation, he was cardioverted once but then converted back into atrial fibrillation.  He has been managed with rate control and anticoagulation.    Patient reports that from cardiovascular standpoint he has done well over the past year.  He did have COVID-19 virus approximately 1 month ago and has recovered well from that.  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  At home blood pressure is averaging in the 130s/80s.  He is currently asymptomatic.    The following portions of the patient's history were reviewed and updated as appropriate: allergies, current medications, past family history, past medical history, past social history, past surgical history and problem list.      VITAL SIGNS     Visit Vitals  /95 (BP Location: Left arm, Patient Position: Sitting)   Ht 185.4 cm (73\")   Wt 112 kg (246 lb 9.6 oz)   SpO2 99%   BMI 32.53 kg/m²         Wt Readings from Last 3 Encounters:   10/14/24 112 kg (246 lb 9.6 oz)   24 109 kg (239 lb 12.8 oz)   10/04/23 104 kg (229 lb)     Body mass index is 32.53 kg/m².      REVIEW OF SYSTEMS   Review of Systems   Constitutional: Negative for chills, fever, weight gain and weight loss.   Cardiovascular:  Negative for leg swelling.   Respiratory:  Negative " Call placed to pt's phone. Message left asking pt to return our call to discuss CT results. REturn phone number provided.   (Pt left AMA, had out pt CT and needs to hear results ) for cough, snoring and wheezing.    Hematologic/Lymphatic: Negative for bleeding problem. Does not bruise/bleed easily.   Skin:  Negative for color change.   Musculoskeletal:  Negative for falls, joint pain and myalgias.   Gastrointestinal:  Negative for melena.   Genitourinary:  Negative for hematuria.   Neurological:  Negative for excessive daytime sleepiness and light-headedness.   Psychiatric/Behavioral:  Negative for depression. The patient is not nervous/anxious.            PHYSICAL EXAMINATION     Constitutional:       Appearance: Normal appearance. Well-developed.   Eyes:      Conjunctiva/sclera: Conjunctivae normal.   Neck:      Vascular: No carotid bruit.   Pulmonary:      Effort: Pulmonary effort is normal.      Breath sounds: Normal breath sounds.   Cardiovascular:      Normal rate. Irregularly irregular rhythm. Normal S1. Normal S2.       Murmurs: There is no murmur.      No gallop.  No click. No rub.   Edema:     Peripheral edema absent.   Musculoskeletal: Normal range of motion.      Comments: No pedal edema Skin:     General: Skin is warm and dry.   Neurological:      Mental Status: Alert and oriented to person, place, and time.      GCS: GCS eye subscore is 4. GCS verbal subscore is 5. GCS motor subscore is 6.   Psychiatric:         Speech: Speech normal.         Behavior: Behavior normal.         Thought Content: Thought content normal.         Judgment: Judgment normal.           REVIEWED DATA       ECG 12 Lead    Date/Time: 10/14/2024 10:21 AM  Performed by: Kristin Severino APRN    Authorized by: Kristin Severino APRN  Comparison: compared with previous ECG from 10/4/2023  Rhythm: atrial fibrillation  Rate: normal  BPM: 66  Conduction: left bundle branch block  ST Segments: ST segments normal  T Waves: T waves normal  QRS axis: left    Clinical impression: abnormal EKG          Cardiac Procedures:  Echocardiogram 3/20/2017.  The LV was mildly dilated.  Mild LVH.  EF 50-55%.  Mild tricuspid  valve regurgitation.  Left atrium was moderately dilated.  Right atrium moderately to severely dilated.  Echocardiogram 4/27/2020.  LVEF 50-55%.  LV cavity mildly dilated.  Mild LVH.  Abnormal septal wall motion from LBBB.  Abnormal LV diastolic dysfunction with elevated LA pressure in the setting of A. fib.  Trace to mild TR otherwise no significant valvular stenosis or regurgitation.  Estimated RVSP less than 35 mmHg.  Patient in atrial fibrillation.          ASSESSMENT & PLAN     Diagnoses and all orders for this visit:    1. Longstanding persistent atrial fibrillation (Primary)  Overview:  CHADS-VASc Risk Assessment               2 Total Score    1 Hypertension    1 Age 65-74    Criteria that do not apply:    CHF    Age >/= 75    DM    PRIOR STROKE/TIA/THROMBO    Vascular Disease    Sex: Female              Assessment & Plan:  Patient asymptomatic  Heart rate controlled  Continue the following regimen:  Metoprolol succinate 50 mg/day  Rivaroxaban 20 mg/day    Orders:  -     ECG 12 Lead  -     metoprolol succinate XL (TOPROL-XL) 50 MG 24 hr tablet; Take 1 tablet by mouth Daily.  Dispense: 90 tablet; Refill: 3  -     rivaroxaban (Xarelto) 20 MG tablet; Take 1 tablet by mouth Daily With Dinner.  Dispense: 90 tablet; Refill: 3    2. Primary hypertension  Assessment & Plan:  BP controlled at home at 130s/80s, slightly elevated in clinic but typically elevated in doctors visits  Continue the following regimen:  Losartan 50 mg twice daily  Inspra 25 mg/day  Amlodipine 5 mg/day  I have reviewed most recent chemistry panel where renal function and electrolytes are stable    Orders:  -     amLODIPine (NORVASC) 5 MG tablet; Take 1 tablet by mouth Daily.  Dispense: 90 tablet; Refill: 3  -     eplerenone (INSPRA) 25 MG tablet; Take 1 tablet by mouth Daily.  Dispense: 90 tablet; Refill: 3  -     losartan (COZAAR) 50 MG tablet; Take 1 tablet by mouth 2 (Two) Times a Day.  Dispense: 180 tablet; Refill: 3  -     metoprolol  succinate XL (TOPROL-XL) 50 MG 24 hr tablet; Take 1 tablet by mouth Daily.  Dispense: 90 tablet; Refill: 3    3. CHARLY (obstructive sleep apnea)    4. LBBB (left bundle branch block)        Return for Dr. Nathan.    Future Appointments         Provider Department Center    1/28/2025 8:15 AM Abilio Witt MD [facility] SLEEP MEDICINE     10/15/2025 10:00 AM Manuel English MD [facility] CARDIOLOGY ANYA              MEDICATIONS         Discharge Medications            Accurate as of October 14, 2024 12:52 PM. If you have any questions, ask your nurse or doctor.                Changes to Medications        Instructions Start Date   eplerenone 25 MG tablet  Commonly known as: INSPRA  What changed: when to take this  Changed by: JUAN CARLOS Gregory   25 mg, Oral, Daily             Continue These Medications        Instructions Start Date   acetaminophen 650 MG 8 hr tablet  Commonly known as: TYLENOL   1,300 mg, 2 Times Daily      amLODIPine 5 MG tablet  Commonly known as: NORVASC   5 mg, Oral, Daily      azelastine 0.1 % nasal spray  Commonly known as: ASTELIN   2 sprays, 2 Times Daily      cyclobenzaprine 10 MG tablet  Commonly known as: FLEXERIL   1 tablet, Daily PRN      flunisolide 25 MCG/ACT (0.025%) solution nasal spray  Commonly known as: NASALIDE   Daily      HM LIDOCAINE PATCH EX   As Needed      hydroCHLOROthiazide 25 MG tablet   1 tablet, Daily      levocetirizine 5 MG tablet  Commonly known as: XYZAL   5 mg, Every Evening      losartan 50 MG tablet  Commonly known as: COZAAR   50 mg, Oral, 2 Times Daily      metoprolol succinate XL 50 MG 24 hr tablet  Commonly known as: TOPROL-XL   50 mg, Oral, Daily      montelukast 10 MG tablet  Commonly known as: SINGULAIR   1 tablet, Daily      Potassium Citrate ER 15 MEQ (1620 MG) tablet controlled-release   1 tablet, 3 Times Daily      rivaroxaban 20 MG tablet  Commonly known as: Xarelto   20 mg, Oral, Daily With Dinner      tamsulosin  0.4 MG capsule 24 hr capsule  Commonly known as: FLOMAX   0.4 mg, Daily                   **Dragon Disclaimer:   Much of this encounter note is an electronic transcription/translation of spoken language to printed text. The electronic translation of spoken language may permit erroneous, or at times, nonsensical words or phrases to be inadvertently transcribed. Although I have reviewed the note for such errors, some may still exist.

## 2024-10-21 RX ORDER — FOLIC ACID 1 MG/1
1 TABLET ORAL DAILY
Qty: 90 TABLET | Refills: 0 | Status: SHIPPED | OUTPATIENT
Start: 2024-10-21

## 2024-10-21 NOTE — TELEPHONE ENCOUNTER
Folic acid 1mg     LOV: 7/8/24  RTC: 6 months  Filled: 7/9/24 #90   Labs: 7/22/24   Future Appointments   Date Time Provider Department Center   [facility]2024  [facility]   11/29/2024 10:00 AM  MR RM3 (3T WIDE)  [facility]   12/26/2024 10:00 AM Callum Zambrano MD [id number] [facility] 4

## 2024-10-22 DIAGNOSIS — I10 ESSENTIAL HYPERTENSION, BENIGN: ICD-10-CM

## 2024-10-22 RX ORDER — METOPROLOL TARTRATE 100 MG/1
100 TABLET ORAL 2 TIMES DAILY
Qty: 180 TABLET | Refills: 0 | Status: SHIPPED | OUTPATIENT
Start: 2024-10-22

## 2024-11-05 DIAGNOSIS — I10 ESSENTIAL HYPERTENSION, BENIGN: ICD-10-CM

## 2024-11-05 RX ORDER — AMLODIPINE BESYLATE 5 MG/1
5 TABLET ORAL 2 TIMES DAILY
Qty: 180 TABLET | Refills: 0 | Status: SHIPPED | OUTPATIENT
Start: 2024-11-05

## 2024-11-08 ENCOUNTER — HOSPITAL ENCOUNTER (OUTPATIENT)
Dept: ULTRASOUND IMAGING | Age: 84
Discharge: HOME | End: 2024-11-08
Payer: MEDICARE

## 2024-11-08 DIAGNOSIS — K74.60 CIRRHOSIS OF LIVER WITHOUT ASCITES, UNSPECIFIED HEPATIC CIRRHOSIS TYPE (HCC): ICD-10-CM

## 2024-11-08 PROCEDURE — 76700 US EXAM ABDOM COMPLETE: CPT

## 2024-11-29 ENCOUNTER — HOSPITAL ENCOUNTER (OUTPATIENT)
Dept: MRI IMAGING | Facility: HOSPITAL | Age: 84
Discharge: HOME | End: 2024-11-29
Attending: SURGERY
Payer: MEDICARE

## 2024-11-29 DIAGNOSIS — N28.89 RENAL MASS: ICD-10-CM

## 2024-11-29 PROCEDURE — A9575 INJ GADOTERATE MEGLUMI 0.1ML: HCPCS | Performed by: SURGERY

## 2024-11-29 PROCEDURE — 74183 MRI ABD W/O CNTR FLWD CNTR: CPT | Performed by: SURGERY

## 2024-11-29 RX ORDER — GADOTERATE MEGLUMINE 376.9 MG/ML
15 INJECTION INTRAVENOUS
Status: COMPLETED | OUTPATIENT
Start: 2024-11-29 | End: 2024-11-29

## 2024-11-29 RX ADMIN — GADOTERATE MEGLUMINE 15 ML: 376.9 INJECTION INTRAVENOUS at 10:26:00

## 2024-12-09 DIAGNOSIS — I10 WHITE COAT SYNDROME WITH DIAGNOSIS OF HYPERTENSION: Chronic | ICD-10-CM

## 2024-12-09 RX ORDER — HYDRALAZINE HYDROCHLORIDE 25 MG/1
25 TABLET, FILM COATED ORAL 3 TIMES DAILY
Qty: 270 TABLET | Refills: 0 | Status: SHIPPED | OUTPATIENT
Start: 2024-12-09

## 2024-12-26 ENCOUNTER — OFFICE VISIT (OUTPATIENT)
Dept: SURGERY | Facility: CLINIC | Age: 84
End: 2024-12-26

## 2024-12-26 DIAGNOSIS — N28.89 RENAL MASS: Primary | ICD-10-CM

## 2024-12-26 DIAGNOSIS — N20.0 NEPHROLITHIASIS: ICD-10-CM

## 2024-12-26 PROCEDURE — G2211 COMPLEX E/M VISIT ADD ON: HCPCS | Performed by: SURGERY

## 2024-12-26 PROCEDURE — 99213 OFFICE O/P EST LOW 20 MIN: CPT | Performed by: SURGERY

## 2024-12-26 NOTE — PROGRESS NOTES
Urology Clinic Note    Primary Care Provider:  Karlos Guzmán MD     Chief Complaint:   Renal mass    HPI & Assessment:   Lu Nicole is a 84 year old female with history of HTN, OA, HTN, renal mass. CT 2/16/23 showed a 2.4 cm left upper pole renal mass suspicious for RCC.     MRI on 6/1/2023 showed a 2.7 cm left upper pole anterior, partially exophytic complex cystic renal mass.  In comparison, this was 2.5 cm on CT scan from 2/16/2023.  It is just above the superior margin of the left renal vein.     I saw her last on 6/19/2023 and at that time we discussed different options.  She elected to proceed with continued surveillance given minimal growth of the mass and age/comorbidities.  Repeat MRI 12/19/2023 shows no interval change in size of the mass.     Repeat surveillance abdominal MRI on 11/29/2024 shows slight increase in size of complex, enhancing left renal mass now measuring 3.3 cm.    Last creatinine 1.08.    I discussed that the appearance of this mass with continued slow growth seems indicate that this is likely kidney cancer.  I offered different options of nephrectomy versus possible ablation (though location will be challenging for this), versus continued surveillance at her age.  After some thought she elects to proceed with continued surveillance.  Discussed there is a risk of metastatic disease at some point if we continue to let this grow, we will plan to repeat an ultrasound and chest x-ray in 6 months    Plan:   -Renal ultrasound and chest x-ray in 6 months  -Return to clinic in 6 months         History:     Past Medical History:    Abdominal distention    Abdominal pain    Abnormal stress test    Arthritis    Arthritis of hand    Back pain    Bloating    carpal tunnel syndrome right wrist   Global 11/23/2015    Cataract    Constipation    Eye disease    Frequent use of laxatives    High cholesterol    Left hip pain    Leg swelling    Osteoarthritis of left shoulder, unspecified osteoarthritis  type    Osteoarthrosis, unspecified whether generalized or localized, unspecified site    Other and unspecified hyperlipidemia    Pain in joints    PONV (postoperative nausea and vomiting)    slow to awaken    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    Problems with swallowing    S/P total knee replacement using cement, left    Senile osteoporosis    Unspecified essential hypertension    Urethral stricture due to infection    Urinary incontinence    Weakness of right hand    Wears glasses       Past Surgical History:   Procedure Laterality Date    Appendectomy      Cardiac catheterization  11/18/2013    Hemorrhoidectomy,int/ext,simple      Hysterectomy  01/01/1984    YANELI/BSO    Knee replacement surgery  11/03/2009    RIGHT    Knee replacement surgery  11/11/2008    LEFT    Oophorectomy      Other surgical history Right 08/25/2015    Procedure: WRIST CARPAL TUNNEL RELEASE;  Surgeon: Chris Perez MD;  Location:  MAIN OR    Tonsillectomy         Family History   Problem Relation Age of Onset    Breast Cancer Daughter 44    Diabetes Mother     Hypertension Mother     Diabetes Other         sibling, family history of        Social History     Socioeconomic History    Marital status:    Tobacco Use    Smoking status: Never    Smokeless tobacco: Never   Vaping Use    Vaping status: Never Used   Substance and Sexual Activity    Alcohol use: Not Currently     Alcohol/week: 0.0 standard drinks of alcohol    Drug use: No   Other Topics Concern    Caffeine Concern Yes     Comment: 1 cup coffee/day    Exercise No   Social History Narrative    The patient does not use an assistive device..      The patient does live in a home with stairs.       Medications (Active prior to today's visit):  Current Outpatient Medications   Medication Sig Dispense Refill    HYDRALAZINE 25 MG Oral Tab TAKE 1 TABLET BY MOUTH THREE TIMES DAILY 270 tablet 0    AMLODIPINE 5 MG Oral Tab Take 1 tablet by mouth twice daily 180 tablet 0     METOPROLOL TARTRATE 100 MG Oral Tab Take 1 tablet by mouth twice daily 180 tablet 0    METFORMIN HCL 1000 MG Oral Tab TAKE 1 TABLET BY MOUTH TWICE DAILY WITH MEALS 180 tablet 0    FOLIC ACID 1 MG Oral Tab Take 1 tablet by mouth once daily 90 tablet 0    LOSARTAN-HYDROCHLOROTHIAZIDE 100-25 MG Oral Tab Take 1 tablet by mouth once daily 90 tablet 0    Fenofibrate 160 MG Oral Tab Take 1 tablet by mouth once daily 90 tablet 1    DULoxetine 20 MG Oral Cap DR Particles Take 1 capsule (20 mg total) by mouth daily.      diazePAM 2 MG Oral Tab Take 1 tablet (2 mg total) by mouth every 12 (twelve) hours as needed.      Multiple Vitamins-Minerals (ICAPS AREDS FORMULA) Oral Tab Take 1 each by mouth 2 (two) times daily.      Cholecalciferol (VITAMIN D3) 1000 units Oral Cap Take 1 tablet by mouth daily.      Omega 3 1000 MG Oral Cap Take 3 capsules by mouth daily. 90 capsule 3    ASPIRIN 81 MG OR TBEC 1 TABLET DAILY         Allergies:  Allergies[1]    Review of Systems:   A comprehensive 10-point review of systems was completed.  Pertinent positives and negatives are noted in the the HPI.    Physical Exam:   CONSTITUTIONAL: Well developed, well nourished, in no acute distress  NEUROLOGIC: Alert and oriented  HEAD: Normocephalic, atraumatic  EYES: Sclera non-icteric  ENT: Hearing intact, moist mucous membranes  NECK: No obvious goiter or masses  RESPIRATORY: Normal respiratory effort  SKIN: No evident rashes  ABDOMEN: Soft, non-tender, non-distended      In total, 20 minutes were spent on this patient encounter (including chart review, patient history, physical, counseling, documentation, and communication).    Callum Zambrano MD  Staff Urologist  [facility]  Office: [phone]           [1]   Allergies  Allergen Reactions    Cortisone UNKNOWN     Burning sensation to area injected.     Latex RASH

## 2024-12-26 NOTE — PATIENT INSTRUCTIONS
Follow up in 6 months     Schedule ultrasound prior - call central scheduling to schedule [phone]    Thank you

## 2025-01-07 ENCOUNTER — OFFICE VISIT (OUTPATIENT)
Dept: INTERNAL MEDICINE CLINIC | Facility: CLINIC | Age: 85
End: 2025-01-07
Payer: MEDICARE

## 2025-01-07 VITALS
SYSTOLIC BLOOD PRESSURE: 142 MMHG | DIASTOLIC BLOOD PRESSURE: 70 MMHG | TEMPERATURE: 97 F | HEART RATE: 73 BPM | WEIGHT: 176.19 LBS | HEIGHT: 63 IN | BODY MASS INDEX: 31.22 KG/M2 | OXYGEN SATURATION: 100 % | RESPIRATION RATE: 16 BRPM

## 2025-01-07 DIAGNOSIS — M16.12 PRIMARY OSTEOARTHRITIS OF LEFT HIP: Primary | Chronic | ICD-10-CM

## 2025-01-07 PROBLEM — K80.20 GALLSTONES: Status: ACTIVE | Noted: 2025-01-07

## 2025-01-07 PROCEDURE — 99213 OFFICE O/P EST LOW 20 MIN: CPT | Performed by: INTERNAL MEDICINE

## 2025-01-07 RX ORDER — LOSARTAN POTASSIUM AND HYDROCHLOROTHIAZIDE 25; 100 MG/1; MG/1
1 TABLET ORAL DAILY
Qty: 90 TABLET | Refills: 0 | Status: SHIPPED | OUTPATIENT
Start: 2025-01-07

## 2025-01-07 RX ORDER — LOSARTAN POTASSIUM AND HYDROCHLOROTHIAZIDE 25; 100 MG/1; MG/1
1 TABLET ORAL DAILY
Qty: 90 TABLET | Refills: 0 | OUTPATIENT
Start: 2025-01-07

## 2025-01-07 RX ORDER — NAPROXEN 500 MG/1
500 TABLET ORAL 2 TIMES DAILY PRN
Qty: 60 TABLET | Refills: 0 | Status: SHIPPED | OUTPATIENT
Start: 2025-01-07 | End: 2025-02-06

## 2025-01-07 NOTE — PROGRESS NOTES
Lu Nicole  3/4/1940 is a 84 year old female.    Chief Complaint   Patient presents with    Groin Pain       HPI:   C/o pain in hip left.  In the past has seen physical medicine rehab has had local injections there still continues to have discomfort mainly at night when she relaxes  Hip  Presents with c/o Pain left  Injury  no  Swelling  no no  Weakness. no       Current Outpatient Medications   Medication Sig Dispense Refill    losartan-hydroCHLOROthiazide 100-25 MG Oral Tab Take 1 tablet by mouth daily. 90 tablet 0    naproxen 500 MG Oral Tab Take 1 tablet (500 mg total) by mouth 2 (two) times daily as needed. 60 tablet 0    HYDRALAZINE 25 MG Oral Tab TAKE 1 TABLET BY MOUTH THREE TIMES DAILY 270 tablet 0    AMLODIPINE 5 MG Oral Tab Take 1 tablet by mouth twice daily 180 tablet 0    METOPROLOL TARTRATE 100 MG Oral Tab Take 1 tablet by mouth twice daily 180 tablet 0    METFORMIN HCL 1000 MG Oral Tab TAKE 1 TABLET BY MOUTH TWICE DAILY WITH MEALS 180 tablet 0    FOLIC ACID 1 MG Oral Tab Take 1 tablet by mouth once daily 90 tablet 0    Fenofibrate 160 MG Oral Tab Take 1 tablet by mouth once daily 90 tablet 1    DULoxetine 20 MG Oral Cap DR Particles Take 1 capsule (20 mg total) by mouth daily.      diazePAM 2 MG Oral Tab Take 1 tablet (2 mg total) by mouth every 12 (twelve) hours as needed.      Multiple Vitamins-Minerals (ICAPS AREDS FORMULA) Oral Tab Take 1 each by mouth 2 (two) times daily.      Cholecalciferol (VITAMIN D3) 1000 units Oral Cap Take 1 tablet by mouth daily.      Omega 3 1000 MG Oral Cap Take 3 capsules by mouth daily. 90 capsule 3    ASPIRIN 81 MG OR TBEC 1 TABLET DAILY        Past Medical History:    Abdominal distention    Abdominal pain    Abnormal stress test    Arthritis    Arthritis of hand    Back pain    Bloating    carpal tunnel syndrome right wrist   Global 2015    Cataract    Constipation    Eye disease    Frequent use of laxatives    High cholesterol    Left hip pain    Leg  swelling    Osteoarthritis of left shoulder, unspecified osteoarthritis type    Osteoarthrosis, unspecified whether generalized or localized, unspecified site    Other and unspecified hyperlipidemia    Pain in joints    PONV (postoperative nausea and vomiting)    slow to awaken    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    Problems with swallowing    S/P total knee replacement using cement, left    Senile osteoporosis    Unspecified essential hypertension    Urethral stricture due to infection    Urinary incontinence    Weakness of right hand    Wears glasses      Social History:  Social History     Socioeconomic History    Marital status:    Tobacco Use    Smoking status: Never    Smokeless tobacco: Never   Vaping Use    Vaping status: Never Used   Substance and Sexual Activity    Alcohol use: Not Currently     Alcohol/week: 0.0 standard drinks of alcohol    Drug use: No   Other Topics Concern    Caffeine Concern Yes     Comment: 1 cup coffee/day    Exercise No   Social History Narrative    The patient does not use an assistive device..      The patient does live in a home with stairs.        REVIEW OF SYSTEMS:       General/Constitutional:   Chills no. Fatigue no. Fever no.         EXAM:   /70   Pulse 73   Temp 97.3 °F (36.3 °C) (Temporal)   Resp 16   Ht 5' 3\" (1.6 m)   Wt 176 lb 3.2 oz (79.9 kg)   SpO2 100%   BMI 31.21 kg/m²       HIP:  INSPECTION:normal  WOUNDS: no wounds appreciated.   PALPATION: non-tender.   STABILITY: no instability.   VASCULAR: normal  STRENGTH: 5/5 all motor groups.   SENSATION: intact to light touch.   ROM: Left hip does show full range of movement however internal rotation does induce some discomfort  LEG LENGTH: equal.      Abdomen examinations unremarkable           ASSESSMENT AND PLAN:   Lu was seen today for groin pain.    Diagnoses and all orders for this visit:    Primary osteoarthritis of left hip  -     naproxen 500 MG Oral Tab; Take 1 tablet (500 mg  total) by mouth 2 (two) times daily as needed.    Other orders  -     losartan-hydroCHLOROthiazide 100-25 MG Oral Tab; Take 1 tablet by mouth daily.       May see orthopedic if so desired.        Patient Instructions   Patient had a colonoscopy in 2023.  Also does follow-up with urology for the left renal mass   The patient indicates understanding of these issues and agrees to the plan.  The patient is asked to No follow-ups on file.  .      Karlos Guzmán MD

## 2025-01-15 RX ORDER — FOLIC ACID 1 MG/1
1 TABLET ORAL DAILY
Qty: 90 TABLET | Refills: 0 | Status: SHIPPED | OUTPATIENT
Start: 2025-01-15

## 2025-01-15 NOTE — TELEPHONE ENCOUNTER
LOV: 1/7/25   RTC: none noted  Filled: 10/21/24 #90   Labs: 7/22/24   Future Appointments   Date Time Provider Department Center   6/24/2025  9:15 AM Callum Zambrano MD [id number] [facility] 4

## 2025-01-16 RX ORDER — LOSARTAN POTASSIUM AND HYDROCHLOROTHIAZIDE 25; 100 MG/1; MG/1
1 TABLET ORAL DAILY
Qty: 90 TABLET | Refills: 0 | OUTPATIENT
Start: 2025-01-16

## 2025-01-16 NOTE — TELEPHONE ENCOUNTER
Protocol failed     LOV: 1/7/25   RTC: none noted  Filled: 1/7/25 #90   Labs: 7/22/24   Future Appointments   Date Time Provider Department Center   6/24/2025  9:15 AM Callum Zambrano MD [id number] [facility] 4

## 2025-01-23 DIAGNOSIS — I10 ESSENTIAL HYPERTENSION, BENIGN: ICD-10-CM

## 2025-01-23 RX ORDER — METOPROLOL TARTRATE 100 MG/1
100 TABLET ORAL 2 TIMES DAILY
Qty: 180 TABLET | Refills: 0 | Status: SHIPPED | OUTPATIENT
Start: 2025-01-23

## 2025-01-23 NOTE — TELEPHONE ENCOUNTER
Name from pharmacy: metFORMIN HCl 1000 MG Oral Tablet         Will file in chart as: METFORMIN HCL 1000 MG Oral Tab    Sig: TAKE 1 TABLET BY MOUTH TWICE DAILY WITH MEALS    Disp: 180 tablet    Refills: 0    Start: 1/23/2025    Class: Normal    Non-formulary    Last ordered: 3 months ago (10/22/2024) by Karlos Guzmán MD    Last refill: 10/23/2024    Rx #: [id number]    Diabetes Medication Protocol [id number]/23/2025 09:17 AM   Protocol Details Last A1C < 7.5 and within past 6 months    Microalbumin procedure in past 12 months or taking ACE/ARB    In person appointment or virtual visit in the past 6 mos or appointment in next 3 mos    EGFRCR or GFRNAA > 50    GFR in the past 12 months    Medication is active on med list       Name from pharmacy: Metoprolol Tartrate 100 MG Oral Tablet         Will file in chart as: METOPROLOL TARTRATE 100 MG Oral Tab    Sig: Take 1 tablet by mouth twice daily    Disp: 180 tablet    Refills: 0    Start: 1/23/2025    Class: Normal    Non-formulary For: Essential hypertension, benign    Last ordered: 3 months ago (10/22/2024) by Karlos Guzmán MD    Last refill: 10/24/2024    Rx #: [id number]    Hypertension Medications Protocol [id number]/23/2025 09:17 AM   Protocol Details Last BP reading less than 140/90    CMP or BMP in past 12 months    In person appointment or virtual visit in the past 12 mos or appointment in next 3 mos    EGFRCR or GFRNAA > 50    Medication is active on med list      To be filled at: [facility] [address] [place] [address] [phone], [phone]

## 2025-02-05 DIAGNOSIS — I10 ESSENTIAL HYPERTENSION, BENIGN: ICD-10-CM

## 2025-02-05 NOTE — TELEPHONE ENCOUNTER
Requesting    Name from pharmacy: amLODIPine Besylate 5 MG Oral Tablet         Will file in chart as: AMLODIPINE 5 MG Oral Tab    Sig: Take 1 tablet by mouth twice daily    Disp: 180 tablet    Refills: 0    Start: 2/5/2025    Class: Normal    Non-formulary For: Essential hypertension, benign    Last ordered: 3 months ago (11/5/2024) by Karlos Guzmán MD    Last refill: 11/6/2024    Rx #: [id number]    Hypertension Medications Protocol [id number]/05/2025 09:08 AM   Protocol Details Last BP reading less than 140/90    CMP or BMP in past 12 months    In person appointment or virtual visit in the past 12 mos or appointment in next 3 mos    EGFRCR or GFRNAA > 50    Medication is active on med list        LOV: 1/7/2025  RTC: none noted   Last Relevant Labs: 7/22/2024  Filled: 11/5/2024 #180 with 0 refills    Future Appointments   Date Time Provider Department Center   6/24/2025  9:15 AM Callum Zambrano MD [id number] [facility] 4

## 2025-02-06 RX ORDER — AMLODIPINE BESYLATE 5 MG/1
5 TABLET ORAL 2 TIMES DAILY
Qty: 180 TABLET | Refills: 0 | Status: SHIPPED | OUTPATIENT
Start: 2025-02-06

## 2025-02-25 RX ORDER — FENOFIBRATE 160 MG/1
TABLET ORAL
Qty: 90 TABLET | Refills: 0 | Status: SHIPPED | OUTPATIENT
Start: 2025-02-25

## 2025-02-25 NOTE — TELEPHONE ENCOUNTER
Protocol passed    LOV: 1/7/25   RTC: none noted  Filled: 8/28/24 #90 1 refills   Labs: 7/22/24   Future Appointments   Date Time Provider Department Center   6/24/2025  9:15 AM Callum Zambrano MD [id number] [facility] 4

## 2025-02-26 ENCOUNTER — APPOINTMENT (OUTPATIENT)
Dept: CT IMAGING | Age: 85
End: 2025-02-26
Attending: EMERGENCY MEDICINE
Payer: MEDICARE

## 2025-02-26 ENCOUNTER — HOSPITAL ENCOUNTER (OUTPATIENT)
Age: 85
Discharge: HOME | End: 2025-02-26
Attending: EMERGENCY MEDICINE
Payer: MEDICARE

## 2025-02-26 VITALS
RESPIRATION RATE: 18 BRPM | TEMPERATURE: 98 F | SYSTOLIC BLOOD PRESSURE: 151 MMHG | HEIGHT: 62 IN | DIASTOLIC BLOOD PRESSURE: 68 MMHG | HEART RATE: 59 BPM | WEIGHT: 163 LBS | BODY MASS INDEX: 30 KG/M2 | OXYGEN SATURATION: 97 %

## 2025-02-26 DIAGNOSIS — N28.89 KIDNEY MASS: Primary | ICD-10-CM

## 2025-02-26 DIAGNOSIS — N30.00 ACUTE CYSTITIS WITHOUT HEMATURIA: ICD-10-CM

## 2025-02-26 DIAGNOSIS — R16.0 LIVER MASS: ICD-10-CM

## 2025-02-26 LAB
#MXD IC: 1.1 X10ˆ3/UL (ref 0.1–1)
BILIRUB UR QL STRIP: NEGATIVE
BUN BLD-MCNC: 28 MG/DL (ref 7–18)
CHLORIDE BLD-SCNC: 102 MMOL/L (ref 98–112)
CO2 BLD-SCNC: 24 MMOL/L (ref 21–32)
COLOR UR: YELLOW
CREAT BLD-MCNC: 1.3 MG/DL
EGFRCR SERPLBLD CKD-EPI 2021: 41 ML/MIN/1.73M2 (ref 60–?)
GLUCOSE BLD-MCNC: 90 MG/DL (ref 70–99)
GLUCOSE UR STRIP-MCNC: NEGATIVE MG/DL
HCT VFR BLD AUTO: 34.3 %
HCT VFR BLD CALC: 36 %
HGB BLD-MCNC: 11.1 G/DL
ISTAT IONIZED CALCIUM FOR CHEM 8: 1.3 MMOL/L (ref 1.12–1.32)
KETONES UR STRIP-MCNC: NEGATIVE MG/DL
LYMPHOCYTES # BLD AUTO: 2.4 X10ˆ3/UL (ref 1–4)
LYMPHOCYTES NFR BLD AUTO: 25.9 %
MCH RBC QN AUTO: 28 PG (ref 26–34)
MCHC RBC AUTO-ENTMCNC: 32.4 G/DL (ref 31–37)
MCV RBC AUTO: 86.6 FL (ref 80–100)
MIXED CELL %: 12.3 %
NEUTROPHILS # BLD AUTO: 5.7 X10ˆ3/UL (ref 1.5–7.7)
NEUTROPHILS NFR BLD AUTO: 61.8 %
NITRITE UR QL STRIP: NEGATIVE
PH UR STRIP: 7 [PH]
PLATELET # BLD AUTO: 313 X10ˆ3/UL (ref 150–450)
POTASSIUM BLD-SCNC: 4 MMOL/L (ref 3.6–5.1)
PROT UR STRIP-MCNC: NEGATIVE MG/DL
RBC # BLD AUTO: 3.96 X10ˆ6/UL
SODIUM BLD-SCNC: 137 MMOL/L (ref 136–145)
SP GR UR STRIP: 1.01
UROBILINOGEN UR STRIP-ACNC: <2 MG/DL
WBC # BLD AUTO: 9.2 X10ˆ3/UL (ref 4–11)

## 2025-02-26 PROCEDURE — 87186 SC STD MICRODIL/AGAR DIL: CPT | Performed by: EMERGENCY MEDICINE

## 2025-02-26 PROCEDURE — 99215 OFFICE O/P EST HI 40 MIN: CPT

## 2025-02-26 PROCEDURE — 74177 CT ABD & PELVIS W/CONTRAST: CPT | Performed by: EMERGENCY MEDICINE

## 2025-02-26 PROCEDURE — 96365 THER/PROPH/DIAG IV INF INIT: CPT

## 2025-02-26 PROCEDURE — 80047 BASIC METABLC PNL IONIZED CA: CPT

## 2025-02-26 PROCEDURE — 81002 URINALYSIS NONAUTO W/O SCOPE: CPT

## 2025-02-26 PROCEDURE — 87077 CULTURE AEROBIC IDENTIFY: CPT | Performed by: EMERGENCY MEDICINE

## 2025-02-26 PROCEDURE — 87086 URINE CULTURE/COLONY COUNT: CPT | Performed by: EMERGENCY MEDICINE

## 2025-02-26 PROCEDURE — 99214 OFFICE O/P EST MOD 30 MIN: CPT

## 2025-02-26 PROCEDURE — 85025 COMPLETE CBC W/AUTO DIFF WBC: CPT | Performed by: EMERGENCY MEDICINE

## 2025-02-26 RX ORDER — CEFPODOXIME PROXETIL 200 MG/1
200 TABLET, FILM COATED ORAL 2 TIMES DAILY
Qty: 20 TABLET | Refills: 0 | Status: SHIPPED | OUTPATIENT
Start: 2025-02-26 | End: 2025-03-08

## 2025-02-26 NOTE — ED INITIAL ASSESSMENT (HPI)
Patient with known left kidney cyst x 4 years. Severe left flank pain overnight, difficulty urinating, abdominal swelling. Pain currently 5/10. No hematuria.

## 2025-02-26 NOTE — ED PROVIDER NOTES
Patient Seen in: [facility]      History     Chief Complaint   Patient presents with    Abdomen/Flank Pain     Stated Complaint: Back Pain; Trouble Walking    Subjective:   HPI      Patient is a 84-year-old female who has history of a left kidney cyst.  Patient states last night she developed significant pain to her left flank left lower quadrant.  Patient states she felt like she was urinating more frequently.  Patient states she did take Tylenol which did seem to help.  Patient currently rates pain 3 out of 10, was worse last night.  Patient denies nausea vomiting, no fevers or chills, no diarrhea, no blood in the urine.  Patient has a history of a known left kidney cyst/mass.  Remainder of review of systems negative.    Objective:     Past Medical History:    Abdominal distention    Abdominal pain    Abnormal stress test    Arthritis    Arthritis of hand    Back pain    Bloating    carpal tunnel syndrome right wrist   Global 11/23/2015    Cataract    Constipation    Eye disease    Frequent use of laxatives    High cholesterol    Left hip pain    Leg swelling    Osteoarthritis of left shoulder, unspecified osteoarthritis type    Osteoarthrosis, unspecified whether generalized or localized, unspecified site    Other and unspecified hyperlipidemia    Pain in joints    PONV (postoperative nausea and vomiting)    slow to awaken    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    Problems with swallowing    S/P total knee replacement using cement, left    Senile osteoporosis    Unspecified essential hypertension    Urethral stricture due to infection    Urinary incontinence    Weakness of right hand    Wears glasses              Past Surgical History:   Procedure Laterality Date    Appendectomy      Cardiac catheterization  11/18/2013    Hemorrhoidectomy,int/ext,simple      Hysterectomy  01/01/1984    YANELI/BSO    Knee replacement surgery  11/03/2009    RIGHT    Knee replacement surgery  11/11/2008     LEFT    Oophorectomy      Other surgical history Right 08/25/2015    Procedure: WRIST CARPAL TUNNEL RELEASE;  Surgeon: Chris Perez MD;  Location:  MAIN OR    Tonsillectomy                  Social History     Socioeconomic History    Marital status:    Tobacco Use    Smoking status: Never    Smokeless tobacco: Never   Vaping Use    Vaping status: Never Used   Substance and Sexual Activity    Alcohol use: Not Currently     Alcohol/week: 0.0 standard drinks of alcohol    Drug use: No   Other Topics Concern    Caffeine Concern Yes     Comment: 1 cup coffee/day    Exercise No   Social History Narrative    The patient does not use an assistive device..      The patient does live in a home with stairs.          Non-smoker, no alcohol.    Review of Systems    Positive for stated complaint: Back Pain; Trouble Walking  Other systems are as noted in HPI.  Constitutional and vital signs reviewed.      All other systems reviewed and negative except as noted above.    Physical Exam     ED Triage Vitals [02/26/25 1109]   /68   Pulse 59   Resp 18   Temp 97.8 °F (36.6 °C)   Temp src Oral   SpO2 97 %   O2 Device None (Room air)       Current Vitals:   Vital Signs  BP: 151/68  Pulse: 59  Resp: 18  Temp: 97.8 °F (36.6 °C)  Temp src: Oral    Oxygen Therapy  SpO2: 97 %  O2 Device: None (Room air)        Physical Exam   GENERAL: Patient resting  on the cart in no acute distress.  HEENT: Extraocular muscles intact, pupils equal round reactive to light and accommodation.  Mouth normal, neck supple, no meningismus.  LUNGS: Lungs clear to auscultation bilaterally.  CARDIOVASCULAR: + S1-S2, regular rate and rhythm, no murmurs.  BACK: No CVA tenderness, no midline bony tenderness.  ABDOMEN: + Bowel sounds, soft, mild tenderness left flank left lower quadrant, nondistended.  No rebound, no guarding, no hepatosplenomegaly.  EXTREMITIES: Full range of motion, no tenderness, good capillary refill.  Primary 5 strength lateral  lower extremities.  Normal dorsiflexion plantarflexion.  Sensation tact light touch bilateral lower extremities.  SKIN: No rash, good turgor.  NEURO: Patient answers questions appropriately.  No focal deficits appreciated.  Conversant.  Ambulatory        ED Course     Labs Reviewed   POCT CBC - Abnormal; Notable for the following components:       Result Value    HGB IC 11.1 (*)     HCT IC 34.3 (*)     # Mixed Cells 1.1 (*)     All other components within normal limits   [facility] POCT URINALYSIS DIPSTICK - Abnormal; Notable for the following components:    Urine Clarity Cloudy (*)     Blood, Urine Trace-Intact (*)     Leukocyte esterase urine Small (*)     All other components within normal limits   POCT ISTAT CHEM8 CARTRIDGE - Abnormal; Notable for the following components:    ISTAT BUN 28 (*)     ISTAT Creatinine 1.30 (*)     eGFR-Cr 41 (*)     All other components within normal limits   URINE CULTURE, ROUTINE            CT abdomen pelvisCONCLUSION:  Enlarging hepatic mass with peripheral nodular enhancement.  This may represent an enlarging hemangioma.       There is an enlarging enhancing left renal solid mass.  Renal cell carcinoma should be excluded.      Left adrenal nodule appears stable.      Colonic diverticulosis.   Independent reviewed by myself, renal mass       MDM      Patient was given IV fluids and Rocephin for likely UTI.  Patient will be treated with cefpodoxime.  Patient felt comfortable going home.  Patient was shown a picture of her enlarging renal mass as well as hepatic mass.  Patient should follow-up evaluation with urology primary physician as discussed.  Call today for an appointment.  Return if new or worse symptoms.  I did consider pyelonephritis, UTI, diverticulitis, renal mass.  Use Tyle or ibuprofen as discussed.  Plenty fluids.  Antibiotics as prescribed        Medical Decision Making      Disposition and Plan     Clinical Impression:  1. Kidney mass    2. Liver mass    3. Acute cystitis  without hematuria         Disposition:  Discharge  2/26/2025  1:54 pm    Follow-up:  Karlos Guzmán MD  [address] [facility] [address]  [facility] [address] [phone]    In 2 days      Callum Zambrano MD  [address] [facility] [address]  [facility] [address]  [phone]    Call in 2 days            Medications Prescribed:  Current Discharge Medication List        START taking these medications    Details   cefpodoxime 200 MG Oral Tab Take 1 tablet (200 mg total) by mouth 2 (two) times daily for 10 days.  Qty: 20 tablet, Refills: 0                 Supplementary Documentation:

## 2025-02-26 NOTE — DISCHARGE INSTRUCTIONS
Follow-up for further evaluation with urology regarding enlarging kidney mass.  Plenty of fluids.  Tylenol, ibuprofen as discussed.  Antibiotics as prescribed.  Return if new or worse symptoms.  Follow-up with primary physician as well regarding liver abnormality.

## 2025-03-04 ENCOUNTER — OFFICE VISIT (OUTPATIENT)
Dept: INTERNAL MEDICINE CLINIC | Facility: CLINIC | Age: 85
End: 2025-03-04
Payer: MEDICARE

## 2025-03-04 VITALS
OXYGEN SATURATION: 97 % | BODY MASS INDEX: 31.28 KG/M2 | TEMPERATURE: 97 F | HEIGHT: 62 IN | DIASTOLIC BLOOD PRESSURE: 60 MMHG | WEIGHT: 170 LBS | RESPIRATION RATE: 16 BRPM | HEART RATE: 60 BPM | SYSTOLIC BLOOD PRESSURE: 122 MMHG

## 2025-03-04 DIAGNOSIS — N28.89 RENAL MASS: Primary | ICD-10-CM

## 2025-03-04 PROBLEM — D18.03 HEMANGIOMA OF LIVER: Status: ACTIVE | Noted: 2025-03-04

## 2025-03-04 PROBLEM — D18.03 HEMANGIOMA OF LIVER: Chronic | Status: ACTIVE | Noted: 2025-03-04

## 2025-03-04 PROCEDURE — 99213 OFFICE O/P EST LOW 20 MIN: CPT | Performed by: INTERNAL MEDICINE

## 2025-03-04 NOTE — PROGRESS NOTES
Lu Nicole  3/4/1940  is a 85 year old female.    Chief Complaint   Patient presents with    Follow - Up     Kidney Infection       HPI:   Follow-up ER currently has no symptoms but does have an incidental abnormality noted on the CT abdomen  Current Outpatient Medications   Medication Sig Dispense Refill    cefpodoxime 200 MG Oral Tab Take 1 tablet (200 mg total) by mouth 2 (two) times daily for 10 days. 20 tablet 0    FENOFIBRATE 160 MG Oral Tab Take 1 tablet by mouth once daily 90 tablet 0    AMLODIPINE 5 MG Oral Tab Take 1 tablet by mouth twice daily 180 tablet 0    METFORMIN HCL 1000 MG Oral Tab TAKE 1 TABLET BY MOUTH TWICE DAILY WITH MEALS 180 tablet 0    METOPROLOL TARTRATE 100 MG Oral Tab Take 1 tablet by mouth twice daily 180 tablet 0    FOLIC ACID 1 MG Oral Tab Take 1 tablet by mouth once daily 90 tablet 0    losartan-hydroCHLOROthiazide 100-25 MG Oral Tab Take 1 tablet by mouth daily. 90 tablet 0    HYDRALAZINE 25 MG Oral Tab TAKE 1 TABLET BY MOUTH THREE TIMES DAILY 270 tablet 0    DULoxetine 20 MG Oral Cap DR Particles Take 1 capsule (20 mg total) by mouth daily.      diazePAM 2 MG Oral Tab Take 1 tablet (2 mg total) by mouth every 12 (twelve) hours as needed.      Multiple Vitamins-Minerals (ICAPS AREDS FORMULA) Oral Tab Take 1 each by mouth 2 (two) times daily.      Cholecalciferol (VITAMIN D3) 1000 units Oral Cap Take 1 tablet by mouth daily.      Omega 3 1000 MG Oral Cap Take 3 capsules by mouth daily. 90 capsule 3    ASPIRIN 81 MG OR TBEC 1 TABLET DAILY        Past Medical History:    Abdominal distention    Abdominal pain    Abnormal stress test    Arthritis    Arthritis of hand    Back pain    Bloating    carpal tunnel syndrome right wrist   Global 2015    Cataract    Constipation    Eye disease    Frequent use of laxatives    High cholesterol    Left hip pain    Leg swelling    Osteoarthritis of left shoulder, unspecified osteoarthritis type    Osteoarthrosis, unspecified whether  generalized or localized, unspecified site    Other and unspecified hyperlipidemia    Pain in joints    PONV (postoperative nausea and vomiting)    slow to awaken    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    Problems with swallowing    S/P total knee replacement using cement, left    Senile osteoporosis    Unspecified essential hypertension    Urethral stricture due to infection    Urinary incontinence    Weakness of right hand    Wears glasses      Social History:  Social History     Socioeconomic History    Marital status:    Tobacco Use    Smoking status: Never    Smokeless tobacco: Never   Vaping Use    Vaping status: Never Used   Substance and Sexual Activity    Alcohol use: Not Currently     Alcohol/week: 0.0 standard drinks of alcohol    Drug use: No   Other Topics Concern    Caffeine Concern Yes     Comment: 1 cup coffee/day    Exercise No   Social History Narrative    The patient does not use an assistive device..      The patient does live in a home with stairs.         REVIEW OF SYSTEMS:   GENERAL HEALTH: feels well otherwise  SKIN: denies any unusual skin lesions or rashes  RESPIRATORY: no shortness of breath with exertion  :Denies urinary frequency, urgency, dysuria, suprapubic pain, back pain, fever , hematuria. Denies any vaginal symptoms,menstural abnormalities    GI: no nausea or vomiting  NEURO: denies headaches    EXAM:   /60 (BP Location: Left arm, Patient Position: Sitting, Cuff Size: adult)   Pulse 60   Temp 97.1 °F (36.2 °C) (Temporal)   Resp 16   Ht 5' 2\" (1.575 m)   Wt 170 lb (77.1 kg)   SpO2 97%   BMI 31.09 kg/m²   GENERAL: well developed, well nourished,in no apparent distress  SKIN: no rashes,no suspicious lesions  GI: good BS's,no masses, HSM; suprapubic tenderness, no CVAT    ASSESSMENT AND PLAN:   Lu was seen today for follow - up.    Diagnoses and all orders for this visit:    Renal mass  -     Urology Referral - In Network         Patient Instructions    CT scan discussed with patient she does have an appointment with Dr. Lees    Instructions given on increasing fluid intake  The patient indicates understanding of these issues and agrees to the plan.  The patient is asked to return in Return in about 4 weeks (around 4/1/2025), or cpx.     Karlos Guzmán MD

## 2025-03-11 DIAGNOSIS — I10 WHITE COAT SYNDROME WITH DIAGNOSIS OF HYPERTENSION: Chronic | ICD-10-CM

## 2025-03-11 RX ORDER — HYDRALAZINE HYDROCHLORIDE 25 MG/1
25 TABLET, FILM COATED ORAL 3 TIMES DAILY
Qty: 270 TABLET | Refills: 0 | Status: SHIPPED | OUTPATIENT
Start: 2025-03-11

## 2025-03-11 NOTE — TELEPHONE ENCOUNTER
Name from pharmacy: hydrALAZINE HCl 25 MG Oral Tablet         Will file in chart as: HYDRALAZINE 25 MG Oral Tab    Sig: TAKE 1 TABLET BY MOUTH THREE TIMES DAILY    Disp: 270 tablet    Refills: 0    Start: 3/11/2025    Class: Normal    Non-formulary For: White coat syndrome with diagnosis of hypertension    Last ordered: 3 months ago (12/9/2024) by Karlos Guzmán MD    Last refill: 12/9/2024    Rx #: [id number]    Hypertension Medications Protocol [id number]/11/2025 09:06 AM   Protocol Details EGFRCR or GFRNAA > 50    CMP or BMP in past 12 months    Last BP reading less than 140/90    In person appointment or virtual visit in the past 12 mos or appointment in next 3 mos    Medication is active on med list      To be filled at: [facility] [address] [place] [address] [phone], [phone]

## 2025-03-13 ENCOUNTER — LAB ENCOUNTER (OUTPATIENT)
Dept: LAB | Age: 85
End: 2025-03-13
Attending: SPECIALIST
Payer: MEDICARE

## 2025-03-13 DIAGNOSIS — D50.9 IRON DEFICIENCY ANEMIA, UNSPECIFIED IRON DEFICIENCY ANEMIA TYPE: ICD-10-CM

## 2025-03-13 DIAGNOSIS — D64.9 NORMOCYTIC NORMOCHROMIC ANEMIA: ICD-10-CM

## 2025-03-13 LAB
BASOPHILS # BLD AUTO: 0.04 X10(3) UL (ref 0–0.2)
BASOPHILS NFR BLD AUTO: 0.6 %
DEPRECATED HBV CORE AB SER IA-ACNC: 83 NG/ML
EOSINOPHIL # BLD AUTO: 0.2 X10(3) UL (ref 0–0.7)
EOSINOPHIL NFR BLD AUTO: 2.9 %
ERYTHROCYTE [DISTWIDTH] IN BLOOD BY AUTOMATED COUNT: 14.3 %
HCT VFR BLD AUTO: 35 %
HGB BLD-MCNC: 11.6 G/DL
IMM GRANULOCYTES # BLD AUTO: 0.04 X10(3) UL (ref 0–1)
IMM GRANULOCYTES NFR BLD: 0.6 %
IRON SATN MFR SERPL: 21 %
IRON SERPL-MCNC: 85 UG/DL
LYMPHOCYTES # BLD AUTO: 2.33 X10(3) UL (ref 1–4)
LYMPHOCYTES NFR BLD AUTO: 33.6 %
MCH RBC QN AUTO: 28.5 PG (ref 26–34)
MCHC RBC AUTO-ENTMCNC: 33.1 G/DL (ref 31–37)
MCV RBC AUTO: 86 FL
MONOCYTES # BLD AUTO: 0.9 X10(3) UL (ref 0.1–1)
MONOCYTES NFR BLD AUTO: 13 %
NEUTROPHILS # BLD AUTO: 3.42 X10 (3) UL (ref 1.5–7.7)
NEUTROPHILS # BLD AUTO: 3.42 X10(3) UL (ref 1.5–7.7)
NEUTROPHILS NFR BLD AUTO: 49.3 %
PLATELET # BLD AUTO: 318 10(3)UL (ref 150–450)
RBC # BLD AUTO: 4.07 X10(6)UL
TOTAL IRON BINDING CAPACITY: 406 UG/DL (ref 250–425)
TRANSFERRIN SERPL-MCNC: 331 MG/DL (ref 250–380)
WBC # BLD AUTO: 6.9 X10(3) UL (ref 4–11)

## 2025-03-13 PROCEDURE — 83550 IRON BINDING TEST: CPT

## 2025-03-13 PROCEDURE — 36415 COLL VENOUS BLD VENIPUNCTURE: CPT

## 2025-03-13 PROCEDURE — 83540 ASSAY OF IRON: CPT

## 2025-03-13 PROCEDURE — 85025 COMPLETE CBC W/AUTO DIFF WBC: CPT

## 2025-03-13 PROCEDURE — 82728 ASSAY OF FERRITIN: CPT

## 2025-03-14 ENCOUNTER — TELEPHONE (OUTPATIENT)
Age: 85
End: 2025-03-14

## 2025-03-14 NOTE — TELEPHONE ENCOUNTER
Called patient to advise her per Dr Gonzalez that her Hgb was stable and her iron studies are wnl, no further follow up needed. Patient conveyed understanding.

## 2025-03-14 NOTE — TELEPHONE ENCOUNTER
----- Message from Michael Gonzalez sent at 3/14/2025  4:08 PM CDT -----  Please contact patient. Let her know that her hemoglobin is stable and iron studies are normal. No further follow up with hematology needed.

## 2025-03-30 DIAGNOSIS — I10 WHITE COAT SYNDROME WITH DIAGNOSIS OF HYPERTENSION: Chronic | ICD-10-CM

## 2025-03-31 RX ORDER — HYDRALAZINE HYDROCHLORIDE 25 MG/1
25 TABLET, FILM COATED ORAL 3 TIMES DAILY
Qty: 270 TABLET | Refills: 0 | Status: SHIPPED | OUTPATIENT
Start: 2025-03-31

## 2025-03-31 NOTE — TELEPHONE ENCOUNTER
Protocol failed     LOV: 3/4/25   RTC: 4 weeks  Labs: 3/13/25   Filled: 3/11/25 #270 0 refill  Future Appointments   Date Time Provider Department Center   6/24/2025  9:15 AM Callum Zambrano MD [id number]

## 2025-04-02 RX ORDER — LOSARTAN POTASSIUM AND HYDROCHLOROTHIAZIDE 25; 100 MG/1; MG/1
1 TABLET ORAL DAILY
Qty: 90 TABLET | Refills: 0 | Status: SHIPPED | OUTPATIENT
Start: 2025-04-02

## 2025-04-02 NOTE — TELEPHONE ENCOUNTER
Name from pharmacy: Losartan Potassium-HCTZ 100-25 MG Oral Tablet         Will file in chart as: LOSARTAN-HYDROCHLOROTHIAZIDE 100-25 MG Oral Tab    Sig: Take 1 tablet by mouth once daily    Disp: 90 tablet    Refills: 0    Start: 4/2/2025    Class: Normal    Non-formulary    Last ordered: 2 months ago (1/7/2025) by Karlos Guzmán MD    Last refill: 1/7/2025    Rx #: [id number]    Hypertension Medications Protocol Ngtuip2804/02/2025 02:12 PM   Protocol Details EGFRCR or GFRNAA > 50    CMP or BMP in past 12 months    Last BP reading less than 140/90    In person appointment or virtual visit in the past 12 mos or appointment in next 3 mos    Medication is active on med list      To be filled at: [facility] [address] [place] [address] [phone], [phone]

## 2025-04-09 RX ORDER — LOSARTAN POTASSIUM AND HYDROCHLOROTHIAZIDE 25; 100 MG/1; MG/1
1 TABLET ORAL DAILY
Qty: 90 TABLET | Refills: 0 | Status: SHIPPED | OUTPATIENT
Start: 2025-04-09

## 2025-04-09 NOTE — TELEPHONE ENCOUNTER
Protocol failed     LOV: 3/4/25   RTC: 4 weeks  Labs: 7/22/24   Filled: 4/2/25   Future Appointments   Date Time Provider Department Center   4/28/2025  4:00 PM Karlos Guzmán MD [facility] 8 [facility] [place]   6/24/2025  9:15 AM Callum Zambrano MD [id number]

## 2025-04-22 DIAGNOSIS — I10 ESSENTIAL HYPERTENSION, BENIGN: ICD-10-CM

## 2025-04-22 NOTE — TELEPHONE ENCOUNTER
Name from pharmacy: metFORMIN HCl 1000 MG Oral Tablet         Will file in chart as: METFORMIN HCL 1000 MG Oral Tab    Sig: TAKE 1 TABLET BY MOUTH TWICE DAILY WITH MEALS    Disp: 180 tablet    Refills: 0    Start: 4/22/2025    Class: Normal    Non-formulary    Last ordered: 2 months ago (1/23/2025) by Karlos Guzmán MD    Last refill: 1/23/2025    Rx #: [id number]    Diabetes Medication Protocol [id number]/22/2025 03:26 PM   Protocol Details Last A1C < 7.5 and within past 6 months    Microalbumin procedure in past 12 months or taking ACE/ARB    EGFRCR or GFRNAA > 50    In person appointment or virtual visit in the past 6 mos or appointment in next 3 mos    GFR in the past 12 months    Medication is active on med list       Name from pharmacy: Metoprolol Tartrate 100 MG Oral Tablet         Will file in chart as: METOPROLOL TARTRATE 100 MG Oral Tab    Sig: Take 1 tablet by mouth twice daily    Disp: 180 tablet    Refills: 0    Start: 4/22/2025    Class: Normal    Non-formulary For: Essential hypertension, benign    Last ordered: 2 months ago (1/23/2025) by Karlos Guzmán MD    Last refill: 1/23/2025    Rx #: [id number]    Hypertension Medications Protocol [id number]/22/2025 03:26 PM   Protocol Details EGFRCR or GFRNAA > 50    CMP or BMP in past 12 months    Last BP reading less than 140/90    In person appointment or virtual visit in the past 12 mos or appointment in next 3 mos    Medication is active on med list       Name from pharmacy: Folic Acid 1 MG Oral Tablet         Will file in chart as: FOLIC ACID 1 MG Oral Tab    Sig: Take 1 tablet by mouth once daily    Disp: 90 tablet    Refills: 0    Start: 4/22/2025    Class: Normal    Non-formulary    Last ordered: 3 months ago (1/15/2025) by Karlos Guzmán MD    Last refill: 1/16/2025    Rx #: [id number]       To be filled at: [address] [phone], [phone]

## 2025-04-23 RX ORDER — METOPROLOL TARTRATE 100 MG/1
100 TABLET ORAL 2 TIMES DAILY
Qty: 180 TABLET | Refills: 0 | Status: SHIPPED | OUTPATIENT
Start: 2025-04-23

## 2025-04-23 RX ORDER — FOLIC ACID 1 MG/1
1 TABLET ORAL DAILY
Qty: 90 TABLET | Refills: 0 | Status: SHIPPED | OUTPATIENT
Start: 2025-04-23

## 2025-04-28 ENCOUNTER — OFFICE VISIT (OUTPATIENT)
Dept: INTERNAL MEDICINE CLINIC | Facility: CLINIC | Age: 85
End: 2025-04-28
Payer: MEDICARE

## 2025-04-28 VITALS
OXYGEN SATURATION: 98 % | RESPIRATION RATE: 16 BRPM | HEIGHT: 62 IN | WEIGHT: 176 LBS | BODY MASS INDEX: 32.39 KG/M2 | DIASTOLIC BLOOD PRESSURE: 70 MMHG | SYSTOLIC BLOOD PRESSURE: 130 MMHG | TEMPERATURE: 99 F | HEART RATE: 57 BPM

## 2025-04-28 DIAGNOSIS — E53.8 B12 DEFICIENCY: Chronic | ICD-10-CM

## 2025-04-28 DIAGNOSIS — Z00.00 ROUTINE GENERAL MEDICAL EXAMINATION AT A HEALTH CARE FACILITY: Primary | ICD-10-CM

## 2025-04-28 DIAGNOSIS — E78.1 HYPERTRIGLYCERIDEMIA: Chronic | ICD-10-CM

## 2025-04-28 DIAGNOSIS — I10 WHITE COAT SYNDROME WITH DIAGNOSIS OF HYPERTENSION: Chronic | ICD-10-CM

## 2025-04-28 DIAGNOSIS — D64.9 ANEMIA DUE TO UNKNOWN MECHANISM: ICD-10-CM

## 2025-04-28 PROBLEM — D12.5 BENIGN NEOPLASM OF SIGMOID COLON: Status: RESOLVED | Noted: 2023-11-29 | Resolved: 2025-04-28

## 2025-04-28 NOTE — PROGRESS NOTES
Lu Nicole  3/4/1940 is a 85 year old female.    Chief Complaint   Patient presents with    Annual     MAW       HPI:   Cpx   Current Outpatient Medications   Medication Sig Dispense Refill    METFORMIN HCL 1000 MG Oral Tab TAKE 1 TABLET BY MOUTH TWICE DAILY WITH MEALS 180 tablet 0    METOPROLOL TARTRATE 100 MG Oral Tab Take 1 tablet by mouth twice daily 180 tablet 0    FOLIC ACID 1 MG Oral Tab Take 1 tablet by mouth once daily 90 tablet 0    LOSARTAN-HYDROCHLOROTHIAZIDE 100-25 MG Oral Tab Take 1 tablet by mouth once daily 90 tablet 0    HYDRALAZINE 25 MG Oral Tab TAKE 1 TABLET BY MOUTH THREE TIMES DAILY 270 tablet 0    FENOFIBRATE 160 MG Oral Tab Take 1 tablet by mouth once daily 90 tablet 0    AMLODIPINE 5 MG Oral Tab Take 1 tablet by mouth twice daily 180 tablet 0    DULoxetine 20 MG Oral Cap DR Particles Take 1 capsule (20 mg total) by mouth daily.      diazePAM 2 MG Oral Tab Take 1 tablet (2 mg total) by mouth every 12 (twelve) hours as needed.      Multiple Vitamins-Minerals (ICAPS AREDS FORMULA) Oral Tab Take 1 each by mouth 2 (two) times daily.      Cholecalciferol (VITAMIN D3) 1000 units Oral Cap Take 1 tablet by mouth daily.      Omega 3 1000 MG Oral Cap Take 3 capsules by mouth daily. 90 capsule 3    ASPIRIN 81 MG OR TBEC 1 TABLET DAILY        Past Medical History:    Abdominal distention    Abdominal pain    Abnormal stress test    Arthritis    Arthritis of hand    Back pain    Bloating    carpal tunnel syndrome right wrist   Global 2015    Cataract    Constipation    Eye disease    Frequent use of laxatives    High cholesterol    Left hip pain    Leg swelling    Osteoarthritis of left shoulder, unspecified osteoarthritis type    Osteoarthrosis, unspecified whether generalized or localized, unspecified site    Other and unspecified hyperlipidemia    Pain in joints    PONV (postoperative nausea and vomiting)    slow to awaken    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    Problems with  swallowing    S/P total knee replacement using cement, left    Senile osteoporosis    Unspecified essential hypertension    Urethral stricture due to infection    Urinary incontinence    Weakness of right hand    Wears glasses      Social History:  Social History     Socioeconomic History    Marital status:    Tobacco Use    Smoking status: Never    Smokeless tobacco: Never   Vaping Use    Vaping status: Never Used   Substance and Sexual Activity    Alcohol use: Not Currently     Alcohol/week: 0.0 standard drinks of alcohol    Drug use: No   Other Topics Concern    Caffeine Concern Yes     Comment: 1 cup coffee/day    Exercise No   Social History Narrative    The patient does not use an assistive device..      The patient does live in a home with stairs.        REVIEW OF SYSTEMS:     General/Constitutional:   General able to do usual activities, good exercise tolerance, good general state of health, no fatigue, no fever, no weakness, no weight loss or gain .   HEENT/Neck:   Head no headache, no dizziness, no lightheadedness. Eyes Sees eye MD every 3 months Dr Lindsay - no redness, no drainage. Ears no earaches, no fullness, normal hearing, no tinnitus. Nose and Sinuses no recurrent colds, no discharge, no itching, no hay fever, no nosebleeds, no sinus trouble. Mouth and Pharynx no sore throats, no hoarseness. Neck no lumps, no goiter, no neck stiffness or pain.   Endocrine:   Diabetes yes . Thyroid disorder none.   Respiratory:   Patient denies chest pain, cough, MERCEDES (dyspnea on exertion), chest congestion, blood-tinged sputum/wheezing. Breathing normal pattern .   Cardiovascular:   Patient denies chest pain, shortness of breath/rheumatic fever/murmur/dizzziness . Leg edema none. Orthopnea none. Palpitations none. PND (paroxsymal nocturnal dyspnea) none.  Does exercise daily -bike.  Patient does state that her blood pressure readings at home are decent she tends to get a little excited when she comes  here  Gastrointestinal:   Patient denies abdominal pain, blood in stool, constipation, diarrhea, difficulty swallowing, change in stools, heartburn, nausea, vomiting voluntary weight changes and eating a lot more sensibly no heart burn noted.  Sees GI   Hematology:   Patient denies abnormal bleeding, easy bleeding, easy bruising. Enlarged lymph nodes none.   Women Only:   Patient denies breast pain, breast tenderness,breast lumps,discharge.axillary nodes . Breast lumps or discharge none. Mammogram up-to-date no   Genitourinary:   Patient denies difficulty urinating, frequent urination, hematuria. Dysuria none. Nocturia None. no Urinary frequency. Occ Urinary incontinence. Sees dr Zambrano   Musculoskeletal:   Arthritis No. Back pain no. Joint pain occ knees and shoulders -still continues to have discomfort in the left hip area did see the orthopedic physician who has referred her to physical medicine rehab for lumbar spine injection.  She has had 2 with minimal relief.  Does see  Dr. Vance for right hand pain   prn Joint stiffness no. Muscle weakness none.   Peripheral Vascular:   General no varicosities, no claudication.   Dermatologic:   Rash no.   Neurologic:   Patient denies dizziness, fainting, loss of consciousness, weakness. Memory loss none. Tingling/numbness none. Trouble with balance none.   Psychiatric:   Patient denies depression, hallucinations, memory loss. Anxiety none. Insomnia none.              EXAM:   /70 (BP Location: Right arm, Patient Position: Sitting, Cuff Size: adult)   Pulse 57   Temp 98.6 °F (37 °C) (Temporal)   Resp 16   Ht 5' 2\" (1.575 m)   Wt 176 lb (79.8 kg)   SpO2 98%   BMI 32.19 kg/m²     GENERAL:   Build: normal .   General Appearance: alert and oriented, pleasant.   HEENT:   Ear canals: normal.   EOM: within normal limit-conjunctiva normal.   Head: normocephalic.   Nasal septum: midline.   Nose: unremarkable.   Oral cavity: normal.   Pupils: normal, bilaterally .    Sclera: normal.   Turbinates: normal.   NECK:   Carotid bruit: none.   Cervical lymph nodes: unremarkable.   JVD: none.   Range of Motion: normal.   Thyroid: unremarkable.   HEART:   Clicks: absent .   Edema: none visible .   Heart sounds: normal.   Murmurs: none.   Rhythm: regular.   BREASTS:   Appearance: symmetrical.   Breast Mass: no palpable masses.   General: unremarkable.   Nipple Abnormality: none.   LUNGS:   Auscultation: clear .   Chest Shape: normal .   Percussion: normal.   Rales: no .   Respiratory effort: normal .   Rhonchi: no.   Wheezes: no.   ABDOMEN:   General: normal.   Hernia: absent.   Inguinal nodes: none.   Liver, Spleen: non-enlarged.   Rebound tenderness: absent.   Tenderness: absent .   EXTREMITIES:   Clubbing: none.   Cyanosis: absent .   Edema: none.   Pulses: present, bilateral.   Tremors: no.   Varicose veins: not present.   MUSCULOSKELETAL:   Cervical spines: normal.   L-S spines: normal.   Lower extremity joints: kassandra knee replaced.  Early hammertoe noted.  Left hip movements are pretty much normal except for mild tenderness  Upper extremity . Heberden and yazan nodes noted-mild OA both shoulders  -right hand per Dr. Vance.  Right shoulder movements significantly restricted secondary to extensive degenerative joint disease.  Patient not interested in any treatment  NEUROLOGICAL:   Babinski: negative/all reflexes are normal.   Cerebellar Testing grossly/intact: yes.   Gait: normal.  Does not seem to be in any significant distress  Motor: power-normal/tone -normal/co-ordination normal/wasting -none/involuntary movements -none.   Sensory: normal sensation to all modalities.   LYMPHATICS:   Groin: no adenopathy .   Inguinal: no adenopathy.   Supraclavicular: none.   DERMATOLOGY:   Rash: no.                  ASSESSMENT AND PLAN:   Lu was seen today for annual.    Diagnoses and all orders for this visit:    Routine general medical examination at a health care facility  -     Comp  Metabolic Panel (14); Future  -     Hemoglobin A1C; Future  -     Assay, Thyroid Stim Hormone; Future  -     T4 (Thyroxine Total); Future    B12 deficiency    Hypertriglyceridemia  -     Lipid Panel; Future    White coat syndrome with diagnosis of hypertension  -     EKG 12 Lead performed at [facility]; Future    Anemia due to unknown mechanism  -     B12 AND FOLATE; Future      Patient Instructions   Pending labs.  No mammogram DEXA desired.   The patient indicates understanding of these issues and agrees to the plan.  The patient is asked to Return in about 6 months (around 10/28/2025) for Scheduled follow-up.  .      Karlos Guzmán MD

## 2025-04-30 ENCOUNTER — LAB ENCOUNTER (OUTPATIENT)
Dept: LAB | Age: 85
End: 2025-04-30
Attending: INTERNAL MEDICINE
Payer: MEDICARE

## 2025-04-30 DIAGNOSIS — Z00.00 ROUTINE GENERAL MEDICAL EXAMINATION AT A HEALTH CARE FACILITY: ICD-10-CM

## 2025-04-30 DIAGNOSIS — D64.9 ANEMIA DUE TO UNKNOWN MECHANISM: ICD-10-CM

## 2025-04-30 DIAGNOSIS — E78.1 HYPERTRIGLYCERIDEMIA: Chronic | ICD-10-CM

## 2025-04-30 LAB
ALBUMIN SERPL-MCNC: 4.7 G/DL (ref 3.2–4.8)
ALBUMIN/GLOB SERPL: 1.9 {RATIO} (ref 1–2)
ALP LIVER SERPL-CCNC: 35 U/L (ref 55–142)
ALT SERPL-CCNC: 15 U/L (ref 10–49)
ANION GAP SERPL CALC-SCNC: 8 MMOL/L (ref 0–18)
AST SERPL-CCNC: 31 U/L (ref ?–34)
BILIRUB SERPL-MCNC: 0.5 MG/DL (ref 0.2–1.1)
BUN BLD-MCNC: 26 MG/DL (ref 9–23)
CALCIUM BLD-MCNC: 11.3 MG/DL (ref 8.7–10.6)
CHLORIDE SERPL-SCNC: 104 MMOL/L (ref 98–112)
CHOLEST SERPL-MCNC: 145 MG/DL (ref ?–200)
CO2 SERPL-SCNC: 28 MMOL/L (ref 21–32)
CREAT BLD-MCNC: 1.3 MG/DL (ref 0.55–1.02)
EGFRCR SERPLBLD CKD-EPI 2021: 41 ML/MIN/1.73M2 (ref 60–?)
EST. AVERAGE GLUCOSE BLD GHB EST-MCNC: 111 MG/DL (ref 68–126)
FASTING PATIENT LIPID ANSWER: YES
FASTING STATUS PATIENT QL REPORTED: YES
FOLATE SERPL-MCNC: >48 NG/ML (ref 5.4–?)
GLOBULIN PLAS-MCNC: 2.5 G/DL (ref 2–3.5)
GLUCOSE BLD-MCNC: 102 MG/DL (ref 70–99)
HBA1C MFR BLD: 5.5 % (ref ?–5.7)
HDLC SERPL-MCNC: 40 MG/DL (ref 40–59)
LDLC SERPL CALC-MCNC: 79 MG/DL (ref ?–100)
NONHDLC SERPL-MCNC: 105 MG/DL (ref ?–130)
OSMOLALITY SERPL CALC.SUM OF ELEC: 295 MOSM/KG (ref 275–295)
POTASSIUM SERPL-SCNC: 4.2 MMOL/L (ref 3.5–5.1)
PROT SERPL-MCNC: 7.2 G/DL (ref 5.7–8.2)
SODIUM SERPL-SCNC: 140 MMOL/L (ref 136–145)
T4 SERPL-MCNC: 9.7 UG/DL (ref 4.5–10.9)
TRIGL SERPL-MCNC: 149 MG/DL (ref 30–149)
TSI SER-ACNC: 2.21 UIU/ML (ref 0.55–4.78)
VIT B12 SERPL-MCNC: 380 PG/ML (ref 211–911)
VLDLC SERPL CALC-MCNC: 23 MG/DL (ref 0–30)

## 2025-04-30 PROCEDURE — 84443 ASSAY THYROID STIM HORMONE: CPT

## 2025-04-30 PROCEDURE — 82746 ASSAY OF FOLIC ACID SERUM: CPT

## 2025-04-30 PROCEDURE — 80053 COMPREHEN METABOLIC PANEL: CPT

## 2025-04-30 PROCEDURE — 80061 LIPID PANEL: CPT

## 2025-04-30 PROCEDURE — 36415 COLL VENOUS BLD VENIPUNCTURE: CPT

## 2025-04-30 PROCEDURE — 82607 VITAMIN B-12: CPT

## 2025-04-30 PROCEDURE — 83036 HEMOGLOBIN GLYCOSYLATED A1C: CPT

## 2025-04-30 PROCEDURE — 84436 ASSAY OF TOTAL THYROXINE: CPT

## 2025-05-02 DIAGNOSIS — E83.52 HYPERCALCEMIA: Primary | ICD-10-CM

## 2025-05-02 RX ORDER — LOSARTAN POTASSIUM 100 MG/1
100 TABLET ORAL DAILY
Qty: 30 TABLET | Refills: 2 | Status: SHIPPED | OUTPATIENT
Start: 2025-05-02 | End: 2025-07-31

## 2025-05-09 DIAGNOSIS — I10 ESSENTIAL HYPERTENSION, BENIGN: ICD-10-CM

## 2025-05-09 NOTE — TELEPHONE ENCOUNTER
Name from pharmacy: amLODIPine Besylate 5 MG Oral Tablet          Will file in chart as: AMLODIPINE 5 MG Oral Tab    Sig: Take 1 tablet by mouth twice daily    Disp: 180 tablet    Refills: 0    Start: 5/9/2025    Class: Normal    Non-formulary For: Essential hypertension, benign    Last ordered: 3 months ago (2/6/2025) by Karlos Guzmán MD    Last refill: 2/6/2025    Rx #: [id number]    Hypertension Medications Protocol Adcjov9905/09/2025 11:00 AM   Protocol Details EGFRCR or GFRNAA > 50    CMP or BMP in past 12 months    Last BP reading less than 140/90    In person appointment or virtual visit in the past 12 mos or appointment in next 3 mos    Medication is active on med list      To be filled at: [facility] [address] [place] [address] [phone], [phone]

## 2025-05-10 RX ORDER — AMLODIPINE BESYLATE 5 MG/1
5 TABLET ORAL 2 TIMES DAILY
Qty: 180 TABLET | Refills: 0 | Status: SHIPPED | OUTPATIENT
Start: 2025-05-10

## 2025-06-09 DIAGNOSIS — E78.1 HYPERTRIGLYCERIDEMIA: Primary | Chronic | ICD-10-CM

## 2025-06-09 NOTE — TELEPHONE ENCOUNTER
Patient walked in requesting refill since she is out of medication, she called pharmacy to refill medication but no success. [facility] #[id number]    FENOFIBRATE 160 MG Oral Tab 90 tablet       Future Appointments   Date Time Provider Department Center   7/10/2025 10:00 AM Callum Zambrano MD [id number]  [facility]   10/28/2025 10:30 AM Joanne Covarrubias MD [facility] [place]

## 2025-06-10 RX ORDER — FENOFIBRATE 160 MG/1
160 TABLET ORAL DAILY
Qty: 90 TABLET | Refills: 3 | Status: SHIPPED | OUTPATIENT
Start: 2025-06-10

## 2025-06-10 NOTE — TELEPHONE ENCOUNTER
LOV: 4/28/2025 with Dr. Guzmán  RTC: 6 months  Last Relevant Labs: 4/30/2025  Filled: 2/25/2025    #90 with 0 refills    Future Appointments   Date Time Provider Department Center   7/10/2025 10:00 AM Callum Zambrano MD [id number]   10/28/2025 10:30 AM Joanne Covarrubias MD [facility] [place]

## 2025-06-13 DIAGNOSIS — E83.52 HYPERCALCEMIA: Primary | ICD-10-CM

## 2025-06-26 ENCOUNTER — LAB ENCOUNTER (OUTPATIENT)
Dept: LAB | Age: 85
End: 2025-06-26
Attending: INTERNAL MEDICINE
Payer: MEDICARE

## 2025-06-26 DIAGNOSIS — E83.52 HYPERCALCEMIA: ICD-10-CM

## 2025-06-26 LAB
ANION GAP SERPL CALC-SCNC: 10 MMOL/L (ref 0–18)
BUN BLD-MCNC: 21 MG/DL (ref 9–23)
CALCIUM BLD-MCNC: 10.7 MG/DL (ref 8.7–10.6)
CHLORIDE SERPL-SCNC: 103 MMOL/L (ref 98–112)
CO2 SERPL-SCNC: 28 MMOL/L (ref 21–32)
CREAT BLD-MCNC: 1.15 MG/DL (ref 0.55–1.02)
EGFRCR SERPLBLD CKD-EPI 2021: 47 ML/MIN/1.73M2 (ref 60–?)
FASTING STATUS PATIENT QL REPORTED: YES
GLUCOSE BLD-MCNC: 94 MG/DL (ref 70–99)
OSMOLALITY SERPL CALC.SUM OF ELEC: 295 MOSM/KG (ref 275–295)
POTASSIUM SERPL-SCNC: 4 MMOL/L (ref 3.5–5.1)
PTH-INTACT SERPL-MCNC: 62.4 PG/ML (ref 18.5–88)
SODIUM SERPL-SCNC: 141 MMOL/L (ref 136–145)

## 2025-06-26 PROCEDURE — 80048 BASIC METABOLIC PNL TOTAL CA: CPT

## 2025-06-26 PROCEDURE — 36415 COLL VENOUS BLD VENIPUNCTURE: CPT

## 2025-06-26 PROCEDURE — 83970 ASSAY OF PARATHORMONE: CPT

## 2025-07-10 ENCOUNTER — OFFICE VISIT (OUTPATIENT)
Dept: SURGERY | Facility: CLINIC | Age: 85
End: 2025-07-10

## 2025-07-10 DIAGNOSIS — N20.0 NEPHROLITHIASIS: ICD-10-CM

## 2025-07-10 DIAGNOSIS — N28.89 RENAL MASS: Primary | ICD-10-CM

## 2025-07-10 PROCEDURE — 99214 OFFICE O/P EST MOD 30 MIN: CPT | Performed by: SURGERY

## 2025-07-10 PROCEDURE — G2211 COMPLEX E/M VISIT ADD ON: HCPCS | Performed by: SURGERY

## 2025-07-10 NOTE — PROGRESS NOTES
Urology Clinic Note    Primary Care Provider:  Joanne Covarrubias MD     Chief Complaint:   Renal mass     HPI & Assessment:   Lu Niocle is a 85 year old female with history of HTN, OA, HTN, renal mass. CT 2/16/23 showed a 2.4 cm left upper pole renal mass suspicious for RCC.     MRI on 6/1/2023 showed a 2.7 cm left upper pole anterior, partially exophytic complex cystic renal mass.  In comparison, this was 2.5 cm on CT scan from 2/16/2023.  It is just above the superior margin of the left renal vein.     I saw her last on 6/19/2023 and at that time we discussed different options.  She elected to proceed with continued surveillance given minimal growth of the mass and age/comorbidities.  Repeat MRI 12/19/2023 shows no interval change in size of the mass.     Repeat surveillance abdominal MRI on 11/29/2024 shows slight increase in size of complex, enhancing left renal mass now measuring 3.3 cm.     Last creatinine 1.15.     I discussed that the appearance of this mass with continued slow growth seems indicate that this is likely kidney cancer.  I offered different options of nephrectomy versus possible ablation (though location will be challenging for this), versus continued surveillance at her age.  After some thought she elects to proceed with continued surveillance.  Discussed there is a risk of metastatic disease at some point if we continue to let this grow, we will plan to repeat an ultrasound and chest x-ray in 6 months.    She ended up having a CT scan instead in the urgent care on 2/26/2025 for back pain.  This showed continued slow growth of LUP renal mass no measuring 3.7 cm.  Also noted was an enlarging liver likely hemangioma.    Plan:   - I again discussed options of continued surveillance versus nephrectomy, she elects for continued surveillance  - Renal ultrasound and chest x-ray in 6 months  - Return to clinic in 6 months       History:   Past Medical History[1]    Past Surgical History[2]    Family  History[3]    Short Social Hx on File[4]    Medications (Active prior to today's visit):  Current Medications[5]    Allergies:  Allergies[6]    Review of Systems:   A comprehensive 10-point review of systems was completed.  Pertinent positives and negatives are noted in the the HPI.    Physical Exam:   CONSTITUTIONAL: Well developed, well nourished, in no acute distress  NEUROLOGIC: Alert and oriented  HEAD: Normocephalic, atraumatic  EYES: Sclera non-icteric  ENT: Hearing intact, moist mucous membranes  NECK: No obvious goiter or masses  RESPIRATORY: Normal respiratory effort  SKIN: No evident rashes  ABDOMEN: Soft, non-tender, non-distended      In total, 30 minutes were spent on this patient encounter (including chart review, patient history, physical, and counseling, documentation, and communication).    Callum Zambrano MD  Staff Urologist  [facility]  Office: [phone]             [1]   Past Medical History:   Abdominal distention    Abdominal pain    Abnormal stress test    Arthritis    Arthritis of hand    Back pain    Bloating    carpal tunnel syndrome right wrist   Global 11/23/2015    Cataract    Constipation    Eye disease    Frequent use of laxatives    High cholesterol    Left hip pain    Leg swelling    Osteoarthritis of left shoulder, unspecified osteoarthritis type    Osteoarthrosis, unspecified whether generalized or localized, unspecified site    Other and unspecified hyperlipidemia    Pain in joints    PONV (postoperative nausea and vomiting)    slow to awaken    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    Problems with swallowing    S/P total knee replacement using cement, left    Senile osteoporosis    Unspecified essential hypertension    Urethral stricture due to infection    Urinary incontinence    Weakness of right hand    Wears glasses   [2]   Past Surgical History:  Procedure Laterality Date    Appendectomy      Cardiac catheterization  11/18/2013     Hemorrhoidectomy,int/ext,simple      Hysterectomy  01/01/1984    YANELI/BSO    Knee replacement surgery  11/03/2009    RIGHT    Knee replacement surgery  11/11/2008    LEFT    Oophorectomy      Other surgical history Right 08/25/2015    Procedure: WRIST CARPAL TUNNEL RELEASE;  Surgeon: Chris Perez MD;  Location:  MAIN OR    Tonsillectomy     [3]   Family History  Problem Relation Age of Onset    Breast Cancer Daughter 44    Diabetes Mother     Hypertension Mother     Diabetes Other         sibling, family history of    [4]   Social History  Socioeconomic History    Marital status:    Tobacco Use    Smoking status: Never    Smokeless tobacco: Never   Vaping Use    Vaping status: Never Used   Substance and Sexual Activity    Alcohol use: Not Currently     Alcohol/week: 0.0 standard drinks of alcohol    Drug use: No   Other Topics Concern    Caffeine Concern Yes     Comment: 1 cup coffee/day    Exercise No   Social History Narrative    The patient does not use an assistive device..      The patient does live in a home with stairs.   [5]   Current Outpatient Medications   Medication Sig Dispense Refill    Fenofibrate 160 MG Oral Tab Take 1 tablet (160 mg total) by mouth daily. 90 tablet 3    AMLODIPINE 5 MG Oral Tab Take 1 tablet by mouth twice daily 180 tablet 0    losartan 100 MG Oral Tab Take 1 tablet (100 mg total) by mouth daily. 30 tablet 2    METFORMIN HCL 1000 MG Oral Tab TAKE 1 TABLET BY MOUTH TWICE DAILY WITH MEALS 180 tablet 0    METOPROLOL TARTRATE 100 MG Oral Tab Take 1 tablet by mouth twice daily 180 tablet 0    FOLIC ACID 1 MG Oral Tab Take 1 tablet by mouth once daily 90 tablet 0    HYDRALAZINE 25 MG Oral Tab TAKE 1 TABLET BY MOUTH THREE TIMES DAILY 270 tablet 0    DULoxetine 20 MG Oral Cap DR Particles Take 1 capsule (20 mg total) by mouth daily.      diazePAM 2 MG Oral Tab Take 1 tablet (2 mg total) by mouth every 12 (twelve) hours as needed.      Multiple Vitamins-Minerals (ICAPS AREDS  FORMULA) Oral Tab Take 1 each by mouth 2 (two) times daily.      Cholecalciferol (VITAMIN D3) 1000 units Oral Cap Take 1 tablet by mouth daily.      Omega 3 1000 MG Oral Cap Take 3 capsules by mouth daily. 90 capsule 3    ASPIRIN 81 MG OR TBEC 1 TABLET DAILY     [6]   Allergies  Allergen Reactions    Cortisone UNKNOWN     Burning sensation to area injected.     Latex RASH

## 2025-07-11 ENCOUNTER — TELEPHONE (OUTPATIENT)
Dept: INTERNAL MEDICINE CLINIC | Facility: CLINIC | Age: 85
End: 2025-07-11

## 2025-07-11 DIAGNOSIS — E83.52 HYPERCALCEMIA: Primary | ICD-10-CM

## 2025-07-18 NOTE — TELEPHONE ENCOUNTER
RP: Patient requesting lab order before her appt on 10/28/25. Patient had labs done in April and June.   Labs pended. Please sign if appropriate. Ty

## 2025-07-18 NOTE — TELEPHONE ENCOUNTER
Mostly just need to monitor calcium levels for now.  Will check metabolic panel before next appointment.  Order entered.  Does not have to fast.

## 2025-07-18 NOTE — TELEPHONE ENCOUNTER
Daughter called about letter. Informed her of the lab results. Asking if RP can place orders for her to retest before oct. Appt?

## 2025-07-21 ENCOUNTER — HOSPITAL ENCOUNTER (OUTPATIENT)
Age: 85
Discharge: HOME | End: 2025-07-21
Payer: MEDICARE

## 2025-07-21 ENCOUNTER — APPOINTMENT (OUTPATIENT)
Dept: GENERAL RADIOLOGY | Age: 85
End: 2025-07-21
Attending: NURSE PRACTITIONER
Payer: MEDICARE

## 2025-07-21 VITALS
DIASTOLIC BLOOD PRESSURE: 66 MMHG | BODY MASS INDEX: 34 KG/M2 | HEART RATE: 54 BPM | WEIGHT: 185 LBS | SYSTOLIC BLOOD PRESSURE: 161 MMHG | OXYGEN SATURATION: 96 % | TEMPERATURE: 98 F | RESPIRATION RATE: 16 BRPM

## 2025-07-21 DIAGNOSIS — S30.0XXA CONTUSION OF BUTTOCK, INITIAL ENCOUNTER: Primary | ICD-10-CM

## 2025-07-21 PROCEDURE — 73502 X-RAY EXAM HIP UNI 2-3 VIEWS: CPT | Performed by: NURSE PRACTITIONER

## 2025-07-21 PROCEDURE — 99214 OFFICE O/P EST MOD 30 MIN: CPT

## 2025-07-21 PROCEDURE — 99213 OFFICE O/P EST LOW 20 MIN: CPT

## 2025-07-21 NOTE — ED INITIAL ASSESSMENT (HPI)
Pt states on fourth of July she fell twice within 30minutes, landing on her buttocks. Pt c/o constant pain since, pain has progressively become worse, denies losing bowl/bladder control since fall.

## 2025-07-21 NOTE — ED PROVIDER NOTES
History   No chief complaint on file.      Subjective:   HPI    Lu Nicole, 85 year old female with notable medical history of anemia, CKD, bilat knee replacement who presents with Left buttock / hip pain. Patient reports falling twice to her Left buttock on 7/4/25 r/t her dog causing her fall and has had persistent pain / discomfort since. Patient does walk with assistive device at baseline.       Problem List[1]   Objective:   Past Medical History:    Abdominal distention    Abdominal pain    Abnormal stress test    Arthritis    Arthritis of hand    Back pain    Bloating    carpal tunnel syndrome right wrist   Global 11/23/2015    Cataract    Constipation    Eye disease    Frequent use of laxatives    High cholesterol    Left hip pain    Leg swelling    Osteoarthritis of left shoulder, unspecified osteoarthritis type    Osteoarthrosis, unspecified whether generalized or localized, unspecified site    Other and unspecified hyperlipidemia    Pain in joints    PONV (postoperative nausea and vomiting)    slow to awaken    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    Problems with swallowing    S/P total knee replacement using cement, left    Senile osteoporosis    Unspecified essential hypertension    Urethral stricture due to infection    Urinary incontinence    Weakness of right hand    Wears glasses              Past Surgical History:   Procedure Laterality Date    Appendectomy      Cardiac catheterization  11/18/2013    Hemorrhoidectomy,int/ext,simple      Hysterectomy  01/01/1984    YANELI/BSO    Knee replacement surgery  11/03/2009    RIGHT    Knee replacement surgery  11/11/2008    LEFT    Oophorectomy      Other surgical history Right 08/25/2015    Procedure: WRIST CARPAL TUNNEL RELEASE;  Surgeon: Chris Perez MD;  Location:  MAIN OR    Tonsillectomy                  Social History     Socioeconomic History    Marital status:    Tobacco Use    Smoking status: Never    Smokeless tobacco:  Never   Vaping Use    Vaping status: Never Used   Substance and Sexual Activity    Alcohol use: Not Currently     Alcohol/week: 0.0 standard drinks of alcohol    Drug use: No   Other Topics Concern    Caffeine Concern Yes     Comment: 1 cup coffee/day    Exercise No   Social History Narrative    The patient does not use an assistive device..      The patient does live in a home with stairs.              Medications Ordered Prior to Encounter[2]      Constitutional and vital signs reviewed.      All other systems reviewed and negative except as noted above.    I have reviewed the family history, social history, allergies, and outpatient medications.     History reviewed from EMR: Encounters, problem list, allergies, medications      Physical Exam     ED Triage Vitals [07/21/25 1039]   BP (!) 182/80   Pulse 54   Resp 16   Temp 97.5 °F (36.4 °C)   Temp src Oral   SpO2 96 %   O2 Device None (Room air)       Current:BP (!) 161/66   Pulse 54   Temp 97.5 °F (36.4 °C) (Oral)   Resp 16   Wt 83.9 kg   SpO2 96%   BMI 33.84 kg/m²       Physical Exam  Vitals and nursing note reviewed.   Constitutional:       General: She is not in acute distress.     Appearance: Normal appearance. She is obese. She is not ill-appearing or toxic-appearing.   HENT:      Head: Normocephalic and atraumatic.      Right Ear: External ear normal.      Left Ear: External ear normal.      Nose: Nose normal.      Mouth/Throat:      Mouth: Mucous membranes are moist.   Eyes:      Extraocular Movements: Extraocular movements intact.      Conjunctiva/sclera: Conjunctivae normal.      Pupils: Pupils are equal, round, and reactive to light.   Cardiovascular:      Rate and Rhythm: Normal rate.      Pulses: Normal pulses.   Pulmonary:      Effort: Pulmonary effort is normal. No respiratory distress.   Musculoskeletal:         General: No swelling or signs of injury. Normal range of motion.      Cervical back: Normal range of motion and neck supple.       Left hip: Tenderness present.        Legs:       Comments: Tender to Left superior-lateral buttock and Left hip region. No gross ecchymosis or swelling.   Skin:     General: Skin is warm and dry.      Capillary Refill: Capillary refill takes less than 2 seconds.      Coloration: Skin is not jaundiced.   Neurological:      General: No focal deficit present.      Mental Status: She is alert and oriented to person, place, and time. Mental status is at baseline.   Psychiatric:         Mood and Affect: Mood normal.         Behavior: Behavior normal.         Thought Content: Thought content normal.         Judgment: Judgment normal.            ED Course     Labs Reviewed - No data to display  XR HIP W OR WO PELVIS 2 OR 3 VIEWS, LEFT (CPT=73502)   Final Result      PROCEDURE: XR HIP W OR WO PELVIS 2 OR 3 VIEWS, LEFT (CPT=73502)      INDICATIONS: multiple falls 7/4/25. Pain to Left buttock / hip region       COMPARISON: There are no comparisons for this exam.      TECHNIQUE: AP and frog leg views of the left hip      FINDINGS: There is an overall stable appearance of the left femoral neck    with lucency present which is unchanged dating back to 4/23. Advanced    osteoarthritic changes with joint space narrowing and subchondral    sclerosis are noted which are unchanged from    the prior study. If there is persistent concern then consider follow-up    imaging.      OTHER:Negative.         =====   CONCLUSION: See above         Electronically Verified and Signed by Attending Radiologist: Rachid Montana MD    7/21/2025 12:09 PM   Workstation: [id number]          Vitals:    07/21/25 1039 07/21/25 1234   BP: (!) 182/80 (!) 161/66   Pulse: 54    Resp: 16    Temp: 97.5 °F (36.4 °C)    TempSrc: Oral    SpO2: 96%    Weight: 83.9 kg             [facility]        Lu Bonnie, 85 year old female with medical history as noted above who presents with Left buttock / hip pain   - Patient in NAD, BP elevated (hx white coat syndrome, will recheck)   -  contusion vs fracture vs hematoma vs other   - Xray ordered       ** See ED course below for additional information on care provided / interventions / notable events throughout patient's encounter.      ED Course as of 07/21/25 1403  ------------------------------------------------------------  Time: 07/21 1148  Comment: Self read with questionable fracture vs nutrient vessel to Left femoral head. Awaiting radiology interpretation.   ------------------------------------------------------------  Time: 07/21 1231  Comment: Radiology noting no acute process as questionable lucency is chronic and on previous imaging  Reassurance provided  Supportive care discussed  Follow up with primary care provider as needed        ** I have independently reviewed the radiology images, clinical lab results, and ECG tracings as described above (if applicable)    ** Concerning co-morbidities possibly affecting complaint / care: chronic gait assistance, overweight        Medical Decision Making  Amount and/or Complexity of Data Reviewed  Radiology: ordered and independent interpretation performed. Decision-making details documented in ED Course.    Risk  OTC drugs.        Disposition and Plan     Disposition:  Discharge  7/21/2025 12:31 pm    Clinical Impression:  1. Contusion of buttock, initial encounter            Home care instructions:     - Tylenol for pain as needed   - Applying heat throughout the day may be helpful   - Avoid prolonged pressure to site to promote healing   - Soft tissue injuries can take a couple weeks / months to resolve   - Follow up with primary care provider as needed       Follow-up:  Joanne Covarrubias MD  [address] [facility] [address] [phone]      As needed          Medications Prescribed:  Discharge Medication List as of 7/21/2025 12:32 PM            Angelito Amos, DNP, APRN, AGACNP-BC, FNP-C, CNL  Adult-Gerontology Acute Care & Family Nurse Practitioner  [facility]      The  above patient (and/or guardian) was made aware that an appropriate evaluation has been performed, and that no additional testing is required at this time. In my medical judgment, there is currently no evidence of an immediate life-threatening or surgical condition, therefore discharge is indicated at this time. The patient (and/or guardian) was advised that a small risk still exists that a serious condition could develop. The patient was instructed to arrange close follow-up with their primary care provider (or the referral provider given today). The patient received written and verbal instructions regarding their condition / concerns, demonstrated understanding, and is agreement with the outpatient treatment plan.              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     Current Outpatient Medications on File Prior to Encounter   Medication Sig Dispense Refill    Fenofibrate 160 MG Oral Tab Take 1 tablet (160 mg total) by mouth daily. 90 tablet 3    AMLODIPINE 5 MG Oral Tab Take 1 tablet by mouth twice daily 180 tablet 0    losartan 100 MG Oral Tab Take 1 tablet (100 mg total) by mouth daily. 30 tablet 2    METFORMIN HCL 1000 MG Oral Tab TAKE 1 TABLET BY MOUTH TWICE DAILY WITH MEALS  180 tablet 0    METOPROLOL TARTRATE 100 MG Oral Tab Take 1 tablet by mouth twice daily 180 tablet 0    FOLIC ACID 1 MG Oral Tab Take 1 tablet by mouth once daily 90 tablet 0    HYDRALAZINE 25 MG Oral Tab TAKE 1 TABLET BY MOUTH THREE TIMES DAILY 270 tablet 0    DULoxetine 20 MG Oral Cap DR Particles Take 1 capsule (20 mg total) by mouth daily.      diazePAM 2 MG Oral Tab Take 1 tablet (2 mg total) by mouth every 12 (twelve) hours as needed.      Multiple Vitamins-Minerals (ICAPS AREDS FORMULA) Oral Tab Take 1 each by mouth 2 (two) times daily.      Cholecalciferol (VITAMIN D3) 1000 units Oral Cap Take 1 tablet by mouth daily.      Omega 3 1000 MG Oral Cap Take 3 capsules by mouth daily. 90 capsule 3    ASPIRIN 81 MG OR TBEC 1 TABLET DAILY

## 2025-07-21 NOTE — DISCHARGE INSTRUCTIONS
- Tylenol for pain as needed   - Applying heat throughout the day may be helpful   - Avoid prolonged pressure to site to promote healing   - Soft tissue injuries can take a couple weeks / months to resolve   - Follow up with primary care provider as needed

## 2025-07-28 DIAGNOSIS — R73.02 GLUCOSE INTOLERANCE (IMPAIRED GLUCOSE TOLERANCE): Primary | Chronic | ICD-10-CM

## 2025-07-28 DIAGNOSIS — I10 ESSENTIAL HYPERTENSION, BENIGN: ICD-10-CM

## 2025-07-28 RX ORDER — METOPROLOL TARTRATE 100 MG/1
100 TABLET ORAL 2 TIMES DAILY
Qty: 180 TABLET | Refills: 3 | Status: SHIPPED | OUTPATIENT
Start: 2025-07-28

## 2025-07-28 RX ORDER — FOLIC ACID 1 MG/1
1 TABLET ORAL DAILY
Qty: 90 TABLET | Refills: 3 | Status: SHIPPED | OUTPATIENT
Start: 2025-07-28

## 2025-07-28 NOTE — TELEPHONE ENCOUNTER
Requesting    folic acid 1 MG Oral Tab         Sig: Take 1 tablet (1 mg total) by mouth daily.    Disp: 90 tablet    Refills: 0    Start: 7/28/2025    Class: Normal    Non-formulary    Last ordered: 3 months ago (4/23/2025) by Karlos Guzmán MD        metFORMIN HCl 1000 MG Oral Tab         Sig: Take 1 tablet (1,000 mg total) by mouth 2 (two) times daily with meals.    Disp: 180 tablet    Refills: 0    Start: 7/28/2025    Class: Normal    Non-formulary    Last ordered: 3 months ago (4/23/2025) by Karlos Guzmán MD    Diabetes Medication Protocol Anlipd7307/28/2025 01:27 PM   Protocol Details EGFRCR or GFRNAA > 50    Last A1C < 7.5 and within past 6 months    In person appointment or virtual visit in the past 6 mos or appointment in next 3 mos    Microalbumin procedure in past 12 months or taking ACE/ARB    GFR in the past 12 months    Medication is active on med list       metoprolol tartrate 100 MG Oral Tab         Sig: Take 1 tablet (100 mg total) by mouth 2 (two) times daily.    Disp: 180 tablet    Refills: 0    Start: 7/28/2025    Class: Normal    Non-formulary For: Essential hypertension, benign    Last ordered: 3 months ago (4/23/2025) by Karlos Guzmán MD    Hypertension Medications Protocol Klvfsi5307/28/2025 01:27 PM   Protocol Details Last BP reading less than 140/90    EGFRCR or GFRNAA > 50    CMP or BMP in past 12 months    In person appointment or virtual visit in the past 12 mos or appointment in next 3 mos    Medication is active on med list        LOV: 4/28/2025 ()  RTC: 6 months  Last Relevant Labs: 6/26/2025  Filled: 4/23/2025 #90 day  with 0 refills    Future Appointments   Date Time Provider Department Center   8/6/2025  9:45 AM [id number]   9/2/2025  6:45 AM [facility] US RM 1 [facility]   10/20/2025 10:30 AM Joanne Covarrubias MD EMG 8 [facility] [place]   1/22/2026 10:00 AM Callum Zambrano MD [id number]

## 2025-08-06 ENCOUNTER — OFFICE VISIT (OUTPATIENT)
Dept: PHYSICAL MEDICINE AND REHAB | Facility: CLINIC | Age: 85
End: 2025-08-06

## 2025-08-06 VITALS — HEIGHT: 62 IN | WEIGHT: 185 LBS | BODY MASS INDEX: 34.04 KG/M2

## 2025-08-06 DIAGNOSIS — M16.12 PRIMARY OSTEOARTHRITIS OF LEFT HIP: Primary | ICD-10-CM

## 2025-08-06 PROCEDURE — 99214 OFFICE O/P EST MOD 30 MIN: CPT | Performed by: PHYSICAL MEDICINE & REHABILITATION

## 2025-08-06 RX ORDER — METHYLPREDNISOLONE 4 MG/1
TABLET ORAL
Qty: 1 EACH | Refills: 0 | Status: SHIPPED | OUTPATIENT
Start: 2025-08-06

## 2025-08-08 DIAGNOSIS — I10 ESSENTIAL HYPERTENSION, BENIGN: ICD-10-CM

## 2025-08-08 RX ORDER — AMLODIPINE BESYLATE 5 MG/1
5 TABLET ORAL 2 TIMES DAILY
Qty: 180 TABLET | Refills: 0 | Status: SHIPPED | OUTPATIENT
Start: 2025-08-08

## (undated) DIAGNOSIS — M54.50 ACUTE MIDLINE LOW BACK PAIN WITHOUT SCIATICA: ICD-10-CM

## (undated) DIAGNOSIS — I10 ESSENTIAL HYPERTENSION, BENIGN: ICD-10-CM

## (undated) DIAGNOSIS — I10 WHITE COAT SYNDROME WITH DIAGNOSIS OF HYPERTENSION: ICD-10-CM

## (undated) NOTE — LETTER
[address], [facility]   Date:   1/13/2023     Name:   Rigo Cruz    YOB: 1940   MRN:   [id number]       WHERE IS YOUR PAIN NOW? Shelia the areas on your body where you feel the described sensations. Use the appropriate symbol. Veronique Merlos the areas of radiation. Include all affected areas. Just to complete the picture, please draw in the face. ACHE:  ^ ^ ^   NUMBNESS:  0000   PINS & NEEDLES:  = = = =                              ^ ^ ^                       0000              = = = =                                    ^ ^ ^                       0000            = = = =      BURNING:  XXXX   STABBING: ////                  XXXX                ////                         XXXX          ////     Please shelia the line below indicating your degree of pain right now  with 0 being no pain 10 being the worst pain possible.                                          0             1             2              3             4              5              6              7             8             9             10         Patient Signature:

## (undated) NOTE — LETTER
[facility]   Date:   8/16/2023     Name:   Vickey Pastrana    YOB: 1940   MRN:   [id number]       WHERE IS YOUR PAIN NOW? Callum the areas on your body where you feel the described sensations. Use the appropriate symbol. Gale Ruiz the areas of radiation. Include all affected areas. Just to complete the picture, please draw in the face. ACHE:  ^ ^ ^   NUMBNESS:  0000   PINS & NEEDLES:  = = = =                              ^ ^ ^                       0000              = = = =                                    ^ ^ ^                       0000            = = = =      BURNING:  XXXX   STABBING: ////                  XXXX                ////                         XXXX          ////     Please callum the line below indicating your degree of pain right now  with 0 being no pain 10 being the worst pain possible.                                          0             1             2              3             4              5              6              7             8             9             10         Patient Signature:

## (undated) NOTE — MR AVS SNAPSHOT
[place]  [address] [facility] [address]  [address] [facility] [address] [phone]               Thank you for choosing us for your health care visit with Meryl Seo MD.  We are glad to serve you and happy to provide you with this gonsales Commonly known as:  LOPRESSOR           Omega 3 1000 MG Caps   Take 3 capsules by mouth daily.                    MyChart                  Visit [facility] online at  [email or web address]

## (undated) NOTE — ED AVS SNAPSHOT
[facility] Emergency Department    [place]  [address] [place] [address]    Phone:  [phone]    Fax:  [phone]           Stacey Pearson   MRN: [id number]    Department:  [facility] Emergency Department   Date of Visit:  5/7/201 To Check ER Wait Times:  TEXT 'ERwait' to 15797      Click [email or web address]      Or call [phone]    If you have any problems with your follow-up, please call our  at [phone]    Si usted tiene algun problema con gonsales sequimiento, por f I have read and understand the instructions given to me by my caregivers. [facility]        Pharmacy Address Phone Number   [address] ([address]) Guillaume Robison

## (undated) NOTE — LETTER
12/31/18        [phone]      Dear Cecil Muro records indicate that you have outstanding lab work and or testing that was ordered for you and has not yet been completed:  Orders Placed This Encounter

## (undated) NOTE — LETTER
[address] [facility]   Date:   12/1/2021     Name:   Claudette Cocker    YOB: 1940   MRN:   [id number]       WHERE IS YOUR PAIN NOW?   Callum the areas on your body where you feel the described sensa

## (undated) NOTE — LETTER
[facility], [place]   Date:   9/22/2023     Name:   Camelia Fernandez    YOB: 1940   MRN:   [id number]       WHERE IS YOUR PAIN NOW? Callum the areas on your body where you feel the described sensations. Use the appropriate symbol. Cherylene Bloodgood the areas of radiation. Include all affected areas. Just to complete the picture, please draw in the face. ACHE:  ^ ^ ^   NUMBNESS:  0000   PINS & NEEDLES:  = = = =                              ^ ^ ^                       0000              = = = =                                    ^ ^ ^                       0000            = = = =      BURNING:  XXXX   STABBING: ////                  XXXX                ////                         XXXX          ////     Please callum the line below indicating your degree of pain right now  with 0 being no pain 10 being the worst pain possible.                                          0             1             2              3             4              5              6              7             8             9             10         Patient Signature:

## (undated) NOTE — LETTER
[address] [facility]   Date:   9/2/2022     Name:   Peetr Murray    YOB: 1940   MRN:   [id number]       WHERE IS YOUR PAIN NOW? Shelia the areas on your body where you feel the described sensations. Use the appropriate symbol. Claudia Helms the areas of radiation. Include all affected areas. Just to complete the picture, please draw in the face. ACHE:  ^ ^ ^   NUMBNESS:  0000   PINS & NEEDLES:  = = = =                              ^ ^ ^                       0000              = = = =                                    ^ ^ ^                       0000            = = = =      BURNING:  XXXX   STABBING: ////                  XXXX                ////                         XXXX          ////     Please shelia the line below indicating your degree of pain right now  with 0 being no pain 10 being the worst pain possible.                                          0             1             2              3             4              5              6              7             8             9             10         Patient Signature:

## (undated) NOTE — LETTER
07/27/18        [address] [phone]      Dear Tequila Landaverde,    Our records indicate that you have outstanding lab work and or testing that was ordered for you and has not yet been completed:          Lipid Panel      Hemog

## (undated) NOTE — ED AVS SNAPSHOT
[facility] Emergency Department    [place]  [address] [place] [address]    Phone:  [phone]    Fax:  [phone]           Jimbo Madsen   MRN: [id number]    Department:  [facility] Emergency Department   Date of Visit:  5/7/201 IF THERE IS ANY CHANGE OR WORSENING OF YOUR CONDITION, CALL YOUR PRIMARY CARE PHYSICIAN AT ONCE OR RETURN IMMEDIATELY TO THE EMERGENCY DEPARTMENT.     If you have been prescribed any medication(s), please fill your prescription right away and begin taking t

## (undated) NOTE — MR AVS SNAPSHOT
[place]  [address] [facility] [address]  [address] [facility] [id number] [phone]               Thank you for choosing us for your health care visit with Martín Rosas MD.  We are glad to serve you and happy to provide you with this gonsales Take 1 tablet (100 mg total) by mouth daily. Commonly known as:  DIFLUCAN           Meloxicam 7.5 MG Tabs   1 tablet daily.            metoprolol Tartrate 25 MG Tabs   TAKE ONE TABLET BY MOUTH TWICE DAILY   Commonly known as:  LOPRESSOR           Omega 3

## (undated) NOTE — MR AVS SNAPSHOT
[place]  [address] [address]  [address] [facility] [address] [phone]               Thank you for choosing us for your health care visit with Denice Tarango MD.  We are glad to serve you and happy to provide you with this gonsales Fenofibrate 160 MG Tabs   TAKE ONE TABLET BY MOUTH ONCE DAILY           Meloxicam 7.5 MG Tabs   1 tablet daily.            metoprolol Tartrate 25 MG Tabs   TAKE ONE TABLET BY MOUTH TWICE DAILY   Commonly known as:  LOPRESSOR           Omega 3 1000 MG Caps

## (undated) NOTE — LETTER
[address] [facility]   Date:   11/9/2022     Name:   Kimberly Cho    YOB: 1940   MRN:   [id number]       WHERE IS YOUR PAIN NOW? Shelia the areas on your body where you feel the described sensations. Use the appropriate symbol. Jung Mayes the areas of radiation. Include all affected areas. Just to complete the picture, please draw in the face. ACHE:  ^ ^ ^   NUMBNESS:  0000   PINS & NEEDLES:  = = = =                              ^ ^ ^                       0000              = = = =                                    ^ ^ ^                       0000            = = = =      BURNING:  XXXX   STABBING: ////                  XXXX                ////                         XXXX          ////     Please shelia the line below indicating your degree of pain right now  with 0 being no pain 10 being the worst pain possible.                                          0             1             2              3             4              5              6              7             8             9             10         Patient Signature:

## (undated) NOTE — LETTER
11/26/21        [address] [phone]      Dear John Perez records indicate that you have outstanding lab work and or testing that was ordered for you and has not yet been completed:  Orders Placed This Encounter

## (undated) NOTE — LETTER
[facility], [place]   Date:   3/23/2023     Name:   Cele Concepcion    YOB: 1940   MRN:   [id number]       WHERE IS YOUR PAIN NOW? Shelia the areas on your body where you feel the described sensations. Use the appropriate symbol. Bartholome Litten the areas of radiation. Include all affected areas. Just to complete the picture, please draw in the face. ACHE:  ^ ^ ^   NUMBNESS:  0000   PINS & NEEDLES:  = = = =                              ^ ^ ^                       0000              = = = =                                    ^ ^ ^                       0000            = = = =      BURNING:  XXXX   STABBING: ////                  XXXX                ////                         XXXX          ////     Please shelia the line below indicating your degree of pain right now  with 0 being no pain 10 being the worst pain possible.                                          0             1             2              3             4              5              6              7             8             9             10         Patient Signature:

## (undated) NOTE — LETTER
06/21/21        [address]idia [address] [phone]      Dear Raymundo Padilla records indicate that you have outstanding lab work and or testing that was ordered for you and has not yet been completed:  Orders Placed This Encounter

## (undated) NOTE — LETTER
7/11/2025    Lu Nicole  [address] [facility] [address]         Dear Lu,    This letter is to inform you that our office has made several attempts to reach you by phone without success.  We were attempting to contact you by phone regarding lab results    Please contact our office at the number listed below as soon as you receive this letter to discuss this issue and to make the necessary changes in our system to your contact information.  Thank you for your cooperation.        Sincerely,    Joanne Covarrubias MD  [address] [facility] [address]  [facility] [address]  Ph: [phone]  Fax: [phone]         Document electronically generated by:  Nelli BO RN

## (undated) NOTE — LETTER
[address]   Date:   6/18/2021     Name:   Rufina Espinoza    YOB: 1940   MRN:   [id number]       WHERE IS YOUR PAIN NOW? Callum the areas on your body where you feel the described sensations.   Use the ap

## (undated) NOTE — LETTER
[facility], Winsome Ruano   Date:   6/14/2023     Name:   Enzo Bailey    YOB: 1940   MRN:   [id number]       WHERE IS YOUR PAIN NOW? Shelia the areas on your body where you feel the described sensations. Use the appropriate symbol. [place] the areas of radiation. Include all affected areas. Just to complete the picture, please draw in the face. ACHE:  ^ ^ ^   NUMBNESS:  0000   PINS & NEEDLES:  = = = =                              ^ ^ ^                       0000              = = = =                                    ^ ^ ^                       0000            = = = =      BURNING:  XXXX   STABBING: ////                  XXXX                ////                         XXXX          ////     Please shelia the line below indicating your degree of pain right now  with 0 being no pain 10 being the worst pain possible.                                          0             1             2              3             4              5              6              7             8             9             10         Patient Signature:

## (undated) NOTE — LETTER
[facility], [place]   Date:   4/19/2023     Name:   Emilie Shankar    YOB: 1940   MRN:   [id number]       WHERE IS YOUR PAIN NOW? Shelia the areas on your body where you feel the described sensations. Use the appropriate symbol. Flip Clavilloist the areas of radiation. Include all affected areas. Just to complete the picture, please draw in the face. ACHE:  ^ ^ ^   NUMBNESS:  0000   PINS & NEEDLES:  = = = =                              ^ ^ ^                       0000              = = = =                                    ^ ^ ^                       0000            = = = =      BURNING:  XXXX   STABBING: ////                  XXXX                ////                         XXXX          ////     Please shelia the line below indicating your degree of pain right now  with 0 being no pain 10 being the worst pain possible.                                          0             1             2              3             4              5              6              7             8             9             10         Patient Signature:

## (undated) NOTE — MR AVS SNAPSHOT
[place]  [address] [facility] [address]  [address] [facility] [id number] [phone]               Thank you for choosing us for your health care visit with Sanam Kelly MD.  We are glad to serve you and happy to provide you with this gonsales Acetaminophen-Codeine 300-30 MG Tabs   Take 1-2 tablets by mouth every 4 (four) hours as needed for Pain. Commonly known as:  TYLENOL WITH CODEINE #3           Alendronate Sodium 70 MG Tabs   TAKE ONE TABLET BY MOUTH ONCE A WEEK.  (NEEDS APPOINTMENT FOR Mult[employer] Lot# [id number] Numeric    [employer] Expiration Date 7/17 Date                  MyChart                  Visit [facility] online at  [email or web address]